# Patient Record
Sex: MALE | Race: WHITE | NOT HISPANIC OR LATINO | Employment: FULL TIME | URBAN - NONMETROPOLITAN AREA
[De-identification: names, ages, dates, MRNs, and addresses within clinical notes are randomized per-mention and may not be internally consistent; named-entity substitution may affect disease eponyms.]

---

## 2023-04-02 ENCOUNTER — HOSPITAL ENCOUNTER (EMERGENCY)
Facility: HOSPITAL | Age: 58
Discharge: HOME | End: 2023-04-02
Attending: EMERGENCY MEDICINE

## 2023-04-02 ENCOUNTER — APPOINTMENT (EMERGENCY)
Dept: NON INVASIVE DIAGNOSTICS | Facility: HOSPITAL | Age: 58
End: 2023-04-02

## 2023-04-02 ENCOUNTER — APPOINTMENT (EMERGENCY)
Dept: RADIOLOGY | Facility: HOSPITAL | Age: 58
End: 2023-04-02

## 2023-04-02 VITALS
SYSTOLIC BLOOD PRESSURE: 113 MMHG | BODY MASS INDEX: 23.1 KG/M2 | OXYGEN SATURATION: 94 % | WEIGHT: 180 LBS | HEIGHT: 74 IN | DIASTOLIC BLOOD PRESSURE: 76 MMHG | HEART RATE: 93 BPM | RESPIRATION RATE: 18 BRPM | TEMPERATURE: 99.2 F

## 2023-04-02 DIAGNOSIS — M25.461 KNEE EFFUSION, RIGHT: Primary | ICD-10-CM

## 2023-04-02 LAB
APPEARANCE FLD: ABNORMAL
COLOR FLD: ABNORMAL
CRYSTALS SNV QL MICRO: NORMAL
LYMPHOCYTES # SNV MANUAL: 18 %
NEUTROPHILS NFR SNV MANUAL: 82 %
RBC # SNV MANUAL: ABNORMAL /UL (ref 0–10)
SITE: ABNORMAL
TOTAL CELLS COUNTED SPEC: 100
WBC # FLD MANUAL: ABNORMAL /UL (ref 0–200)

## 2023-04-02 RX ORDER — PREDNISONE 10 MG/1
TABLET ORAL
Qty: 40 TABLET | Refills: 0 | Status: SHIPPED | OUTPATIENT
Start: 2023-04-02

## 2023-04-02 RX ORDER — KETOROLAC TROMETHAMINE 30 MG/ML
60 INJECTION, SOLUTION INTRAMUSCULAR; INTRAVENOUS ONCE
Status: DISCONTINUED | OUTPATIENT
Start: 2023-04-02 | End: 2023-04-02

## 2023-04-02 RX ORDER — DULOXETIN HYDROCHLORIDE 60 MG/1
60 CAPSULE, DELAYED RELEASE ORAL DAILY
COMMUNITY

## 2023-04-02 RX ORDER — KETOROLAC TROMETHAMINE 30 MG/ML
60 INJECTION, SOLUTION INTRAMUSCULAR; INTRAVENOUS ONCE
Status: COMPLETED | OUTPATIENT
Start: 2023-04-02 | End: 2023-04-02

## 2023-04-02 RX ORDER — IBUPROFEN 800 MG/1
800 TABLET ORAL EVERY 6 HOURS PRN
Qty: 30 TABLET | Refills: 0 | Status: SHIPPED | OUTPATIENT
Start: 2023-04-02

## 2023-04-02 RX ORDER — LEFLUNOMIDE 10 MG/1
10 TABLET ORAL 2 TIMES DAILY
COMMUNITY

## 2023-04-02 RX ORDER — PANTOPRAZOLE SODIUM 40 MG/1
40 TABLET, DELAYED RELEASE ORAL DAILY
COMMUNITY

## 2023-04-02 RX ORDER — LIDOCAINE HYDROCHLORIDE AND EPINEPHRINE 10; 10 MG/ML; UG/ML
20 INJECTION, SOLUTION INFILTRATION; PERINEURAL ONCE
Status: COMPLETED | OUTPATIENT
Start: 2023-04-02 | End: 2023-04-02

## 2023-04-02 RX ADMIN — LIDOCAINE HYDROCHLORIDE,EPINEPHRINE BITARTRATE 20 ML: 10; .01 INJECTION, SOLUTION INFILTRATION; PERINEURAL at 11:50

## 2023-04-02 RX ADMIN — KETOROLAC TROMETHAMINE 60 MG: 30 INJECTION, SOLUTION INTRAMUSCULAR; INTRAVENOUS at 11:50

## 2023-04-02 NOTE — ED PROVIDER NOTES
History  Chief Complaint   Patient presents with   • Knee Pain     Patient reports hx of RA  States right knee pain for the past month that radiates down leg and to buttocks  Taking prednisone without relief  Also reports stiffness in neck     Patient is a 45-year-old male  He has a history of rheumatoid arthritis  He he is on low-dose prednisone  He presents to the emergency room with a 1 month history of increased right knee swelling and pain  No trauma  No fever  Does feel like the lower leg below the knee is swollen  He is also starting to experience some neck and back pain  He reports some occasional tingling to right foot  Otherwise no motor or sensory complaints  Prior to Admission Medications   Prescriptions Last Dose Informant Patient Reported? Taking? Amylase-Lipase-Protease (PANCRELIPASE 27368 PO)   Yes Yes   Sig: Take by mouth   DULoxetine (CYMBALTA) 60 mg delayed release capsule   Yes Yes   Sig: Take 60 mg by mouth daily   leflunomide (ARAVA) 10 MG tablet   Yes Yes   Sig: Take 10 mg by mouth 2 (two) times a day   pantoprazole (PROTONIX) 40 mg tablet   Yes Yes   Sig: Take 40 mg by mouth daily      Facility-Administered Medications: None       Past Medical History:   Diagnosis Date   • RA (rheumatoid arthritis) ([facility] [address] )        Past Surgical History:   Procedure Laterality Date   • CHOLECYSTECTOMY     • FINGER SURGERY Right     fourth digit       History reviewed  No pertinent family history  I have reviewed and agree with the history as documented  E-Cigarette/Vaping     E-Cigarette/Vaping Substances     Social History     Tobacco Use   • Smoking status: Never   • Smokeless tobacco: Never   Substance Use Topics   • Alcohol use: Never   • Drug use: Never       Review of Systems   Constitutional: Negative for chills and fever  HENT: Negative for rhinorrhea and sore throat  Eyes: Negative for pain, redness and visual disturbance     Respiratory: Negative for cough and shortness of breath  Cardiovascular: Negative for chest pain and leg swelling  Gastrointestinal: Negative for abdominal pain, diarrhea and vomiting  Endocrine: Negative for polydipsia and polyuria  Genitourinary: Negative for dysuria, frequency and hematuria  Musculoskeletal: Positive for arthralgias, back pain, joint swelling and neck pain  Skin: Negative for rash and wound  Allergic/Immunologic: Negative for immunocompromised state  Neurological: Negative for weakness, numbness and headaches  Psychiatric/Behavioral: Negative for hallucinations and suicidal ideas  All other systems reviewed and are negative  Physical Exam  Physical Exam  Vitals reviewed  Constitutional:       General: He is not in acute distress  Appearance: He is not toxic-appearing  HENT:      Head: Normocephalic and atraumatic  Nose: Nose normal       Mouth/Throat:      Mouth: Mucous membranes are moist    Eyes:      General:         Right eye: No discharge  Left eye: No discharge  Conjunctiva/sclera: Conjunctivae normal    Cardiovascular:      Rate and Rhythm: Regular rhythm  Tachycardia present  Pulses: Normal pulses  Heart sounds: Normal heart sounds  No murmur heard  No friction rub  No gallop  Pulmonary:      Effort: Pulmonary effort is normal  No respiratory distress  Breath sounds: Normal breath sounds  No stridor  No wheezing, rhonchi or rales  Abdominal:      General: Bowel sounds are normal  There is no distension  Palpations: Abdomen is soft  Tenderness: There is no abdominal tenderness  There is no right CVA tenderness, left CVA tenderness, guarding or rebound  Musculoskeletal:         General: Swelling and tenderness present  No deformity or signs of injury  Cervical back: Normal range of motion and neck supple  No rigidity  Right lower leg: Edema present  Left lower leg: No edema  Comments: Patient does have a right knee effusion    No erythema or or warmth  There might be some slight right lower leg swelling also  Skin:     General: Skin is warm and dry  Coloration: Skin is not jaundiced or pale  Findings: No bruising, erythema or rash  Neurological:      General: No focal deficit present  Mental Status: He is alert and oriented to person, place, and time  Cranial Nerves: No facial asymmetry  Sensory: No sensory deficit  Motor: Motor function is intact  Psychiatric:         Mood and Affect: Mood normal          Behavior: Behavior normal          Vital Signs  ED Triage Vitals [04/02/23 1121]   Temperature Pulse Respirations Blood Pressure SpO2   99 2 °F (37 3 °C) 103 18 134/83 97 %      Temp Source Heart Rate Source Patient Position - Orthostatic VS BP Location FiO2 (%)   Temporal Monitor Sitting Left arm --      Pain Score       8           Vitals:    04/02/23 1121 04/02/23 1200 04/02/23 1230 04/02/23 1300   BP: 134/83 137/85 113/72 111/68   Pulse: 103 92 80 79   Patient Position - Orthostatic VS: Sitting Sitting Sitting Sitting         Visual Acuity      ED Medications  Medications   lidocaine-epinephrine (XYLOCAINE/EPINEPHRINE) 1 %-1:100,000 injection 20 mL (20 mL Infiltration Given 4/2/23 1150)   ketorolac (TORADOL) injection 60 mg (60 mg Intramuscular Given 4/2/23 1150)       Diagnostic Studies  Results Reviewed     Procedure Component Value Units Date/Time    Synovial fluid, Culture and Gram stain [[id number]] Collected: 04/02/23 1227    Lab Status: In process Specimen: Body Fluid from Joint, Right Knee Updated: 04/02/23 1254    Synovial fluid, white cell count w/ diff [[id number]] Collected: 04/02/23 1227    Lab Status: In process Specimen: Synovial Fluid from Joint, Right Knee Updated: 04/02/23 1254    Synovial fluid, RBC count [[id number]] Collected: 04/02/23 1227    Lab Status:  In process Specimen: Synovial Fluid from Joint, Right Knee Updated: 04/02/23 1254    Synovial fluid, crystal [[id number]] Collected: 04/02/23 1227    Lab Status: In process Specimen: Synovial Fluid from Joint, Right Knee Updated: 04/02/23 1254                 XR knee 4+ views Right injury   ED Interpretation by Edgar Silva MD (04/02 1306)   No fracture or dislocation      VAS lower limb venous duplex study, unilateral/limited    (Results Pending)              Procedures  Arthrocentesis    Date/Time: 4/2/2023 12:21 PM  Performed by: Edgar Silva MD  Authorized by: Edgar Silva MD     Indications:  Joint swelling, pain, diagnostic evaluation and therapeutic  Body area:  Knee  Joint:  Right knee  Local anesthesia used?: Yes    Local anesthetic:  Lidocaine 1% with epinephrine  Preparation: Patient was prepped and draped in usual sterile fashion    Needle size:  18 G  Approach:  Lateral  Aspirate amount (ml):  50  Aspirate:  Blood-tinged  Patient tolerance:  Patient tolerated the procedure well with no immediate complications             ED Course                               SBIRT 20yo+    Flowsheet Row Most Recent Value   SBIRT (23 yo +)    In order to provide better care to our patients, we are screening all of our patients for alcohol and drug use  Would it be okay to ask you these screening questions? Yes Filed at: 04/02/2023 1138   Initial Alcohol Screen: US AUDIT-C     1  How often do you have a drink containing alcohol? 0 Filed at: 04/02/2023 1138   2  How many drinks containing alcohol do you have on a typical day you are drinking? 0 Filed at: 04/02/2023 1138   3a  Male UNDER 65: How often do you have five or more drinks on one occasion? 0 Filed at: 04/02/2023 1138   Audit-C Score 0 Filed at: 04/02/2023 1138   GLADYS: How many times in the past year have you    Used an illegal drug or used a prescription medication for non-medical reasons? Never Filed at: 04/02/2023 1138                    Medical Decision Making  X-ray was negative for fracture or dislocation  Ultrasound was negative for DVT    Septic joint is unlikely  Patient is afebrile  The knee is not red or warm  Patient does have a fairly decent range of motion with the knee  This would be unlikely in a septic knee  The knee was tapped for for symptomatic relief  Studies were sent for cell counts, Gram stain, culture, and crystals  These will not be back for some time  Patient is appropriate for discharge and outpatient management  More than likely the knee swelling is secondary to his arthritis  Also the differential would be meniscus injury or other inflammatory causes of knee swelling  Amount and/or Complexity of Data Reviewed  Labs: ordered  Radiology: ordered and independent interpretation performed  Decision-making details documented in ED Course  Risk  Prescription drug management  Decision regarding hospitalization  Disposition  Final diagnoses:   Knee effusion, right     Time reflects when diagnosis was documented in both MDM as applicable and the Disposition within this note     Time User Action Codes Description Comment    4/2/2023  1:18 PM Andreea Bae Add [[id number] [id number]] Knee effusion, right       ED Disposition     ED Disposition   Discharge    Condition   Stable    Date/Time   Sun Apr 2, 2023  1:18 PM    Comment   Lv Portillo discharge to home/self care                 Follow-up Information     Follow up With Specialties Details Why Contact Info    Follow-up with your rheumatologist later this week              Patient's Medications   Discharge Prescriptions    IBUPROFEN (MOTRIN) 800 MG TABLET    Take 1 tablet (800 mg total) by mouth every 6 (six) hours as needed (pain)       Start Date: 4/2/2023  End Date: --       Order Dose: 800 mg       Quantity: 30 tablet    Refills: 0    PREDNISONE 10 MG TABLET    60mg po qd x 3 days, then 40mg po qd x 3 days, then 20mg po qd x 3 days, then 10mg po qd x 3 days, then stop       Start Date: 4/2/2023  End Date: --       Order Dose: --       Quantity: 40 tablet    Refills: 0       No discharge procedures on file      PDMP Review     None          ED Provider  Electronically Signed by           Baylee France MD  04/02/23 3865

## 2023-04-05 LAB
BACTERIA SPEC BFLD CULT: NO GROWTH
GRAM STN SPEC: NORMAL

## 2023-05-16 ENCOUNTER — APPOINTMENT (EMERGENCY)
Dept: CT IMAGING | Facility: HOSPITAL | Age: 58
End: 2023-05-16

## 2023-05-16 ENCOUNTER — APPOINTMENT (EMERGENCY)
Dept: RADIOLOGY | Facility: HOSPITAL | Age: 58
End: 2023-05-16

## 2023-05-16 ENCOUNTER — HOSPITAL ENCOUNTER (INPATIENT)
Facility: HOSPITAL | Age: 58
LOS: 2 days | Discharge: HOME | End: 2023-05-18
Attending: EMERGENCY MEDICINE | Admitting: INTERNAL MEDICINE

## 2023-05-16 DIAGNOSIS — R29.898 LEG WEAKNESS: Primary | ICD-10-CM

## 2023-05-16 DIAGNOSIS — M25.461 EFFUSION OF RIGHT KNEE: ICD-10-CM

## 2023-05-16 DIAGNOSIS — M05.79 RHEUMATOID ARTHRITIS INVOLVING MULTIPLE SITES WITH POSITIVE RHEUMATOID FACTOR (HCC): ICD-10-CM

## 2023-05-16 DIAGNOSIS — R50.9 FEVER, UNKNOWN ORIGIN: ICD-10-CM

## 2023-05-16 DIAGNOSIS — M48.061 LUMBAR STENOSIS: ICD-10-CM

## 2023-05-16 DIAGNOSIS — J02.9 SORE THROAT: ICD-10-CM

## 2023-05-16 DIAGNOSIS — R74.01 TRANSAMINITIS: ICD-10-CM

## 2023-05-16 DIAGNOSIS — M62.838 MUSCLE SPASMS OF NECK: ICD-10-CM

## 2023-05-16 PROBLEM — Q45.3 PANCREATIC ABNORMALITY: Status: ACTIVE | Noted: 2019-02-28

## 2023-05-16 PROBLEM — M79.10 MYALGIA: Status: ACTIVE | Noted: 2023-05-16

## 2023-05-16 LAB
ALBUMIN SERPL BCP-MCNC: 3.6 G/DL (ref 3.5–5)
ALP SERPL-CCNC: 72 U/L (ref 34–104)
ALT SERPL W P-5'-P-CCNC: 22 U/L (ref 7–52)
ANION GAP SERPL CALCULATED.3IONS-SCNC: 10 MMOL/L (ref 4–13)
AST SERPL W P-5'-P-CCNC: 19 U/L (ref 13–39)
BACTERIA UR QL AUTO: ABNORMAL /HPF
BASOPHILS # BLD AUTO: 0.03 THOUSANDS/ÂΜL (ref 0–0.1)
BASOPHILS NFR BLD AUTO: 1 % (ref 0–1)
BILIRUB SERPL-MCNC: 1.21 MG/DL (ref 0.2–1)
BILIRUB UR QL STRIP: NEGATIVE
BUN SERPL-MCNC: 15 MG/DL (ref 5–25)
CALCIUM SERPL-MCNC: 9.3 MG/DL (ref 8.4–10.2)
CHLORIDE SERPL-SCNC: 107 MMOL/L (ref 96–108)
CLARITY UR: CLEAR
CO2 SERPL-SCNC: 20 MMOL/L (ref 21–32)
COLOR UR: YELLOW
CREAT SERPL-MCNC: 1.03 MG/DL (ref 0.6–1.3)
CRP SERPL HS-MCNC: 7.47 MG/L
EOSINOPHIL # BLD AUTO: 0.11 THOUSAND/ÂΜL (ref 0–0.61)
EOSINOPHIL NFR BLD AUTO: 2 % (ref 0–6)
ERYTHROCYTE [DISTWIDTH] IN BLOOD BY AUTOMATED COUNT: 12.9 % (ref 11.6–15.1)
ERYTHROCYTE [SEDIMENTATION RATE] IN BLOOD: 35 MM/HOUR (ref 0–19)
FLUAV RNA RESP QL NAA+PROBE: NEGATIVE
FLUBV RNA RESP QL NAA+PROBE: NEGATIVE
GFR SERPL CREATININE-BSD FRML MDRD: 81 ML/MIN/1.73SQ M
GLUCOSE SERPL-MCNC: 128 MG/DL (ref 65–140)
GLUCOSE UR STRIP-MCNC: NEGATIVE MG/DL
HCT VFR BLD AUTO: 41.6 % (ref 36.5–49.3)
HGB BLD-MCNC: 14.4 G/DL (ref 12–17)
HGB UR QL STRIP.AUTO: NEGATIVE
IMM GRANULOCYTES # BLD AUTO: 0.02 THOUSAND/UL (ref 0–0.2)
IMM GRANULOCYTES NFR BLD AUTO: 0 % (ref 0–2)
KETONES UR STRIP-MCNC: NEGATIVE MG/DL
LACTATE SERPL-SCNC: 1.5 MMOL/L (ref 0.5–2)
LEUKOCYTE ESTERASE UR QL STRIP: NEGATIVE
LIPASE SERPL-CCNC: <6 U/L (ref 11–82)
LYMPHOCYTES # BLD AUTO: 0.98 THOUSANDS/ÂΜL (ref 0.6–4.47)
LYMPHOCYTES NFR BLD AUTO: 16 % (ref 14–44)
MCH RBC QN AUTO: 30.8 PG (ref 26.8–34.3)
MCHC RBC AUTO-ENTMCNC: 34.6 G/DL (ref 31.4–37.4)
MCV RBC AUTO: 89 FL (ref 82–98)
MONOCYTES # BLD AUTO: 0.45 THOUSAND/ÂΜL (ref 0.17–1.22)
MONOCYTES NFR BLD AUTO: 7 % (ref 4–12)
NEUTROPHILS # BLD AUTO: 4.58 THOUSANDS/ÂΜL (ref 1.85–7.62)
NEUTS SEG NFR BLD AUTO: 74 % (ref 43–75)
NITRITE UR QL STRIP: NEGATIVE
NON-SQ EPI CELLS URNS QL MICRO: ABNORMAL /HPF
NRBC BLD AUTO-RTO: 0 /100 WBCS
PH UR STRIP.AUTO: 5.5 [PH]
PLATELET # BLD AUTO: 251 THOUSANDS/UL (ref 149–390)
PMV BLD AUTO: 9 FL (ref 8.9–12.7)
POTASSIUM SERPL-SCNC: 3.8 MMOL/L (ref 3.5–5.3)
PROT SERPL-MCNC: 6.7 G/DL (ref 6.4–8.4)
PROT UR STRIP-MCNC: NEGATIVE MG/DL
RBC # BLD AUTO: 4.68 MILLION/UL (ref 3.88–5.62)
RBC #/AREA URNS AUTO: ABNORMAL /HPF
RSV RNA RESP QL NAA+PROBE: NEGATIVE
S PYO DNA THROAT QL NAA+PROBE: NOT DETECTED
SARS-COV-2 RNA RESP QL NAA+PROBE: NEGATIVE
SODIUM SERPL-SCNC: 137 MMOL/L (ref 135–147)
SP GR UR STRIP.AUTO: 1.01 (ref 1–1.03)
TSH SERPL DL<=0.05 MIU/L-ACNC: 1.74 UIU/ML (ref 0.45–4.5)
UROBILINOGEN UR QL STRIP.AUTO: 0.2 E.U./DL
WBC # BLD AUTO: 6.17 THOUSAND/UL (ref 4.31–10.16)
WBC #/AREA URNS AUTO: ABNORMAL /HPF

## 2023-05-16 RX ORDER — ACETAMINOPHEN 325 MG/1
650 TABLET ORAL EVERY 6 HOURS PRN
Status: DISCONTINUED | OUTPATIENT
Start: 2023-05-16 | End: 2023-05-18 | Stop reason: HOSPADM

## 2023-05-16 RX ORDER — METHOCARBAMOL 500 MG/1
500 TABLET, FILM COATED ORAL EVERY 6 HOURS PRN
Status: DISCONTINUED | OUTPATIENT
Start: 2023-05-16 | End: 2023-05-16

## 2023-05-16 RX ORDER — ENOXAPARIN SODIUM 100 MG/ML
40 INJECTION SUBCUTANEOUS EVERY 24 HOURS
Status: DISCONTINUED | OUTPATIENT
Start: 2023-05-16 | End: 2023-05-18 | Stop reason: HOSPADM

## 2023-05-16 RX ORDER — PANTOPRAZOLE SODIUM 40 MG/1
40 TABLET, DELAYED RELEASE ORAL
Status: DISCONTINUED | OUTPATIENT
Start: 2023-05-17 | End: 2023-05-18 | Stop reason: HOSPADM

## 2023-05-16 RX ORDER — LEFLUNOMIDE 20 MG/1
20 TABLET ORAL DAILY
Status: DISCONTINUED | OUTPATIENT
Start: 2023-05-17 | End: 2023-05-18 | Stop reason: HOSPADM

## 2023-05-16 RX ORDER — DULOXETIN HYDROCHLORIDE 30 MG/1
60 CAPSULE, DELAYED RELEASE ORAL DAILY
Status: DISCONTINUED | OUTPATIENT
Start: 2023-05-17 | End: 2023-05-18 | Stop reason: HOSPADM

## 2023-05-16 RX ORDER — LIDOCAINE 50 MG/G
2 PATCH TOPICAL ONCE
Status: COMPLETED | OUTPATIENT
Start: 2023-05-16 | End: 2023-05-17

## 2023-05-16 RX ORDER — IBUPROFEN 600 MG/1
600 TABLET ORAL EVERY 6 HOURS PRN
Status: DISCONTINUED | OUTPATIENT
Start: 2023-05-16 | End: 2023-05-18 | Stop reason: HOSPADM

## 2023-05-16 RX ORDER — OXYCODONE HYDROCHLORIDE 10 MG/1
10 TABLET ORAL EVERY 4 HOURS PRN
Status: DISCONTINUED | OUTPATIENT
Start: 2023-05-16 | End: 2023-05-18 | Stop reason: HOSPADM

## 2023-05-16 RX ORDER — OXYCODONE HYDROCHLORIDE 5 MG/1
5 TABLET ORAL ONCE
Status: COMPLETED | OUTPATIENT
Start: 2023-05-16 | End: 2023-05-16

## 2023-05-16 RX ORDER — METHOCARBAMOL 500 MG/1
500 TABLET, FILM COATED ORAL 4 TIMES DAILY
Status: DISCONTINUED | OUTPATIENT
Start: 2023-05-16 | End: 2023-05-18 | Stop reason: HOSPADM

## 2023-05-16 RX ORDER — HYDROMORPHONE HCL/PF 1 MG/ML
0.5 SYRINGE (ML) INJECTION EVERY 4 HOURS PRN
Status: DISCONTINUED | OUTPATIENT
Start: 2023-05-16 | End: 2023-05-18 | Stop reason: HOSPADM

## 2023-05-16 RX ADMIN — PANCRELIPASE 24000 UNITS: 24000; 76000; 120000 CAPSULE, DELAYED RELEASE PELLETS ORAL at 18:05

## 2023-05-16 RX ADMIN — OXYCODONE HYDROCHLORIDE 10 MG: 10 TABLET ORAL at 22:25

## 2023-05-16 RX ADMIN — LIDOCAINE 5% 2 PATCH: 700 PATCH TOPICAL at 17:33

## 2023-05-16 RX ADMIN — METHOCARBAMOL 500 MG: 500 TABLET ORAL at 22:23

## 2023-05-16 RX ADMIN — METHOCARBAMOL 500 MG: 500 TABLET ORAL at 17:30

## 2023-05-16 RX ADMIN — IOHEXOL 85 ML: 350 INJECTION, SOLUTION INTRAVENOUS at 11:10

## 2023-05-16 RX ADMIN — OXYCODONE HYDROCHLORIDE 5 MG: 5 TABLET ORAL at 17:30

## 2023-05-16 NOTE — ASSESSMENT & PLAN NOTE
-On cimzia outpatient, contributing to immunocompromised state which makes month of fevers outpatient more concerning   -Gets occasional prednisone tapers for uncontrolled RA flares, recently just about finished with one and thus no steroids needed at this time

## 2023-05-16 NOTE — ASSESSMENT & PLAN NOTE
-Chronic, in April 2023 had joint aspiration and synovial fluid studies that provided symptomatic relief and showed likely inflammatory synovial fluid   -Likely recurrence of chronic inflammation limiting movement and causing pain with active ROM knee  -Treating with general anti-inflammatory treatment including acetaminophen and ibuprofen at this time

## 2023-05-16 NOTE — QUICK NOTE
Kamini present at bedside, discussed the plan of care ruling out spinal cord compression with both partners who expressed understanding and agreed with the need for admission    Answered all their questions at this time   -Shannon Bowers, MD

## 2023-05-16 NOTE — ASSESSMENT & PLAN NOTE
-Present for 1 5 weeks, progressively worsening and concerning given backdrop of rheumatoid arthritis with roughly 1 month of fever/chills while being on immunosuppressant injectable  ESR 35 on admission, no WBC elevation  May be related to pain, right knee swelling discussed below or spinal cord compression, for which MRI L-spine will be obtained to rule out abscess, tumor, disc herniation or other compressive pathologies leading to leg weakness    -PT/OT consulted

## 2023-05-16 NOTE — ASSESSMENT & PLAN NOTE
-C-spine paraspinal tenderness and shoulder pain/tenderness on examination, RLE posterior thigh to posterior ankle pain/tenderness  Symptoms not reminiscent of prior sciatica of LLE but are accompanied with weakness   -Alongside MRI L-spine, will obtain MRI c-spine  -Treating with ibuprofen, acetaminophen, lidocaine patches and methocarbamol    -Also had painful swallowing x1 day PTA, CT neck soft tissue showed no abscess or acute pathology, pain likely correlated with ongoing myalgias in posterior neck  Possible tension etiology given noted poor sitting posture with head thrust forward

## 2023-05-16 NOTE — LETTER
[facility] [address]  [address] [address]  Dept: [phone]    May 18, 2023     Patient: Elmer Wilson   YOB: 1965   Date of Visit: 5/16/2023       To Whom it May Concern:    Elmer Wilson is under my professional care  He was seen in the hospital from 5/16/2023 to 05/18/23  Liver US, MRI, CT neck, CXR all completed along with blood work while he was hospitalized  He may return to work on 6/5/23 without limitations  If you have any questions or concerns, please don't hesitate to call           Sincerely,          Jay Jay Jackson PA-C

## 2023-05-16 NOTE — H&P
[address]  H&P  Name: Valerie Strong 56 y o  male I MRN: [id number]  Unit/Bed#: 495-87 I Date of Admission: 5/16/2023   Date of Service: 5/16/2023 I Hospital Day: 0      Assessment/Plan   * Right leg weakness  Assessment & Plan  -Present for 1 5 weeks, progressively worsening and concerning given backdrop of rheumatoid arthritis with roughly 1 month of fever/chills while being on immunosuppressant injectable  ESR 35 on admission, no WBC elevation  May be related to pain, right knee swelling discussed below or spinal cord compression, for which MRI L-spine will be obtained to rule out abscess, tumor, disc herniation or other compressive pathologies leading to leg weakness  -PT/OT consulted    Effusion of right knee  Assessment & Plan  -Chronic, in April 2023 had joint aspiration and synovial fluid studies that provided symptomatic relief and showed likely inflammatory synovial fluid   -Likely recurrence of chronic inflammation limiting movement and causing pain with active ROM knee  -Treating with general anti-inflammatory treatment including acetaminophen and ibuprofen at this time    Myalgia  Assessment & Plan  -C-spine paraspinal tenderness and shoulder pain/tenderness on examination, RLE posterior thigh to posterior ankle pain/tenderness  Symptoms not reminiscent of prior sciatica of LLE but are accompanied with weakness   -Alongside MRI L-spine, will obtain MRI c-spine  -Treating with ibuprofen, acetaminophen, lidocaine patches and methocarbamol    -Also had painful swallowing x1 day PTA, CT neck soft tissue showed no abscess or acute pathology, pain likely correlated with ongoing myalgias in posterior neck  Possible tension etiology given noted poor sitting posture with head thrust forward      Rheumatoid arthritis involving multiple sites with positive rheumatoid factor ([facility] [address] )  Assessment & Plan  -On cimzia outpatient, contributing to immunocompromised state which makes month of fevers "outpatient more concerning   -Gets occasional prednisone tapers for uncontrolled RA flares, recently just about finished with one and thus no steroids needed at this time  Pancreatic abnormality  Assessment & Plan  -Continue equivalent to home pancreatic enzyme supplementation         VTE Prophylaxis: none, VTE score = 1  Code Status: Level 1 - Full code  POLST: POLST form is not discussed and not completed at this time  Discussion with family: bravosuleiman present at bedside and aware of plan    Anticipated Length of Stay:  Patient will be admitted on an Inpatient basis with an anticipated length of stay of  < 2 midnights  Justification for Hospital Stay: Rule out spinal cord compression    Total Time for Visit, including Counseling / Coordination of Care: 60 minutes  Greater than 50% of this total time spent on direct patient counseling and coordination of care  Chief Complaint:   Right leg weakness    History of Present Illness:     Abilio Llanes is a 56 y o  male who presents with 1 5 weeks of neck pain radiating to the shoulders and right lower extremity pain and weakness over the same time frame  Moderate intensity  the symptoms have been going on since December shortly before he started taking cimzia for his poorly controlled rheumatoid arthritis for which he also occasionally receives prednisone tapers for flareups  Nevertheless he has been immunocompromised for months and for the past few weeks has been having intermittent fevers and chills at home with a Tmax yesterday of 100 4 F  Described neck pain as \"pulling\" and RLE posterior thigh->posterior ankle pain as \"shooting\" but not burning, electric/shocking or numbness/tingling, and states it does not feel like a past episode of sciatica he had years ago  Also notes right knee swelling which was aspirated in April 2023 and synovial fluid was analyzed (see A&P) and fluid has reaccumulated which is making it difficult for him to walk    It is not " painful  ED work-up included ESR, CBC CMP, lactate, Lyme, high sensitive CRP, mononucleosis screen, blood cultures, UA microscopy reflex to cultures, CT soft tissue to work-up a complaint of left neck pain with swallowing x1 day likely correlated with the myalgias he is experiencing on his posterior neck, portable chest x-ray, EKG, RSV/flu/COVID/strep testing  Review of Systems:    Review of Systems   Constitutional: Positive for chills and fever  HENT: Positive for trouble swallowing (pain swallowing starting yesterday)  Negative for congestion, drooling, ear pain, facial swelling, rhinorrhea, sinus pressure, sinus pain and sore throat  Eyes: Negative for pain and visual disturbance  Respiratory: Negative for cough, shortness of breath and wheezing  Cardiovascular: Negative for chest pain, palpitations and leg swelling  Gastrointestinal: Negative for abdominal pain, diarrhea, nausea and vomiting  Genitourinary: Negative for dysuria and hematuria  Musculoskeletal: Positive for gait problem (2/2 right knee swelling and leg pain), joint swelling (right knee) and myalgias (neck and right lower extremity)  Negative for arthralgias and back pain  Skin: Negative for color change and rash  Neurological: Positive for weakness (RLE)  Negative for seizures, syncope, speech difficulty, light-headedness and numbness  Psychiatric/Behavioral: Negative for behavioral problems and confusion  All other systems reviewed and are negative  Past Medical and Surgical History:     Past Medical History:   Diagnosis Date   • Pancreatitis    • RA (rheumatoid arthritis) ([facility] [address] )    • Rheumatoid arthritis        Past Surgical History:   Procedure Laterality Date   • CHOLECYSTECTOMY     • FINGER SURGERY Right     fourth digit       Meds/Allergies:    Prior to Admission medications    Medication Sig Start Date End Date Taking?  Authorizing Provider   Amylase-Lipase-Protease (PANCRELIPASE 24719 PO) Take by mouth "Yes Historical Provider, MD   Certolizumab Pegol (CIMZIA PREFILLED SC) Inject under the skin   Yes Historical Provider, MD   DULoxetine (CYMBALTA) 60 mg delayed release capsule Take 60 mg by mouth daily   Yes Historical Provider, MD   ibuprofen (MOTRIN) 800 mg tablet Take 1 tablet (800 mg total) by mouth every 6 (six) hours as needed (pain) 4/2/23  Yes Robert Song MD   leflunomide (ARAVA) 10 MG tablet Take 10 mg by mouth 2 (two) times a day   Yes Historical Provider, MD   pantoprazole (PROTONIX) 40 mg tablet Take 40 mg by mouth daily   Yes Historical Provider, MD   predniSONE 10 mg tablet 60mg po qd x 3 days, then 40mg po qd x 3 days, then 20mg po qd x 3 days, then 10mg po qd x 3 days, then stop 4/2/23  Yes Robert Song MD     I have reviewed home medications with patient personally  Duloxetine was not on the list of medications he showed me, but I do not want to potentially exclude a psychiatric medication with low risk of negative     Allergies: No Known Allergies    Social History:     Marital Status: Single   Occupation: , part desk-job part lifting  Patient Pre-hospital Living Situation: unknown  Patient Pre-hospital Level of Mobility: ambulatory  Patient Pre-hospital Diet Restrictions: unknown  Substance Use History:   Social History     Substance and Sexual Activity   Alcohol Use Never     Social History     Tobacco Use   Smoking Status Never   Smokeless Tobacco Never     Social History     Substance and Sexual Activity   Drug Use Never       Family History:    non-contributory    Physical Exam:     Vitals:   Blood Pressure: 112/71 (05/16/23 1601)  Pulse: 87 (05/16/23 1601)  Temperature: 98 5 °F (36 9 °C) (05/16/23 1601)  Temp Source: Tympanic (05/16/23 1601)  Respirations: 18 (05/16/23 1601)  Height: 6' 2\" (188 cm) (05/16/23 1601)  Weight - Scale: 81 5 kg (179 lb 10 8 oz) (05/16/23 1601)  SpO2: 98 % (05/16/23 1601)    Physical Exam  Vitals and nursing note reviewed     Constitutional: " General: He is not in acute distress  Appearance: Normal appearance  He is well-developed  He is not ill-appearing  HENT:      Head: Normocephalic and atraumatic  Nose: No rhinorrhea  Eyes:      General: No scleral icterus  Right eye: No discharge  Left eye: No discharge  Conjunctiva/sclera: Conjunctivae normal    Neck:      Comments: No thyromegaly  Cardiovascular:      Rate and Rhythm: Normal rate and regular rhythm  Pulses: Normal pulses  Heart sounds: Normal heart sounds  No murmur heard  No friction rub  No gallop  Pulmonary:      Effort: Pulmonary effort is normal  No respiratory distress  Breath sounds: Normal breath sounds  No stridor  No wheezing, rhonchi or rales  Abdominal:      General: Abdomen is flat  Bowel sounds are normal  There is no distension  Palpations: Abdomen is soft  There is no mass  Tenderness: There is no abdominal tenderness  There is no guarding or rebound  Musculoskeletal:         General: Swelling (right knee effusion compared to L knee  Nontender, no erythema, calor, wound or drainage) and tenderness (Posterior RLE tenderness from thigh to ankle, no anterior tenderness ) present  No signs of injury  Cervical back: Neck supple  Tenderness (mild left anterior neck tenderness  Posterior neck with c-spine paraspinal tenderness extending to the shoulders and upper dorsal arms) present  No rigidity  Lymphadenopathy:      Cervical: No cervical adenopathy  Skin:     General: Skin is warm and dry  Capillary Refill: Capillary refill takes less than 2 seconds  Coloration: Skin is not jaundiced or pale  Findings: No bruising, erythema, lesion or rash  Neurological:      Mental Status: He is alert  Sensory: No sensory deficit  Coordination: Coordination normal (Normal heel-to-shin)        Gait: Gait abnormal (Left leg normal steppage, right leg stiff due to knee swelling and swings out at an obtuse angle before coming to midline)  Comments: Cranial nerve XI test significantly painful but no weakness, upper extremities 5/5 bilaterally  LLE 5/5, RLE 4/5 limited secondary to pain and knee extension limited secondary to knee swelling  Psychiatric:         Mood and Affect: Mood normal          Behavior: Behavior normal              Additional Data:     Lab Results: I have personally reviewed pertinent reports  Results from last 7 days   Lab Units 05/16/23  1031   WBC Thousand/uL 6 17   HEMOGLOBIN g/dL 14 4   HEMATOCRIT % 41 6   PLATELETS Thousands/uL 251   NEUTROS PCT % 74   LYMPHS PCT % 16   MONOS PCT % 7   EOS PCT % 2     Results from last 7 days   Lab Units 05/16/23  1031   SODIUM mmol/L 137   POTASSIUM mmol/L 3 8   CHLORIDE mmol/L 107   CO2 mmol/L 20*   BUN mg/dL 15   CREATININE mg/dL 1 03   ANION GAP mmol/L 10   CALCIUM mg/dL 9 3   ALBUMIN g/dL 3 6   TOTAL BILIRUBIN mg/dL 1 21*   ALK PHOS U/L 72   ALT U/L 22   AST U/L 19   GLUCOSE RANDOM mg/dL 128                 Results from last 7 days   Lab Units 05/16/23  1031   LACTIC ACID mmol/L 1 5       Imaging: I have personally reviewed pertinent reports  CT soft tissue neck with contrast   Final Result by Leann Lye MD (05/16 1131)   1  There is no suspicious mucosal lesion or drainable fluid/abscess collection  2  Small left axillary and intraclavicular lymph nodes which are below size criteria for pathologic adenopathy  3  Mild tree-in-bud type changes within the medial left lung apex, likely infectious/inflammatory in etiology  p      Workstation performed: [id number]         XR chest 1 view portable   Final Result by Fede Barnes MD (05/16 1100)      No acute cardiopulmonary disease                 Workstation performed: [id number]         MRI inpatient order    (Results Pending)       EKG, Pathology, and Other Studies Reviewed on Admission:   · EKG: NSR    ** Please Note: This note has been constructed using a voice recognition system   **

## 2023-05-16 NOTE — ED PROVIDER NOTES
History  Chief Complaint   Patient presents with   • Neck Pain     Patient reports neck pain for greater than a week  States it radiates from neck to b/l shoulders as well as back of head  Taking ibuprofen without relief  This is a pleasant 61-year-old male who presents here with his wife via private vehicle  He is here for evaluation of neck pain back pain right leg pain, right knee pain  Patient states that his leg weakness has progressed and is worsening  The symptoms have been ongoing for quite some time however he has had novel left anterior neck pain and pain with swallowing over the last few days  I did take the time to review this patient's records, he has a history of rheumatoid arthritis     , He does take monthly injections since December for this  He had emergency department visit here last month had his right knee drained  Also had neck and back pain  Follow-up with primary care 1 week ago today they had recommended secondary to his symptoms that he obtain an MRI that has been ordered but he never received it  They recommended that he go to the ER  The primary care doctor is through [facility]  He went to [facility] emergency room, he was given droperidol had a work-up he was offered MRI  Patient states secondary to the side effects of the droperidol he felt antsy, he ended up signing out [address] and never had the MRI completed  He states he was told that it would take 24 hours of him waiting in the ER for this to happen therefore he left  He states he has been having ongoing fevers and chills over the last month  He has been self treating with ibuprofen 400 mg every 8 hours over the last few days  He states he is here because he needs to figure out what is wrong with his neck and back  The neck and back pain there is no history of trauma  It is essentially unchanged    The pain ranges from the base of the skull down the entire spine and down the right posterior leg  There is no incontinence of urine or stool there is no saddle anesthesia however he does admit that he does have some leg weakness  He states he is unable to work secondary to this condition  The fact that he is likely on an immunomodulator and has been having fevers with new anterior neck pain he will be worked up for infectious causes as well as have imaging of the neck  Patient is in agreement with this plan  He does not require any medication at this point in terms of pain medicine however it was offered  The study it was ordered by the other network was a noncontrast MRI of the lumbar spine  Prior to Admission Medications   Prescriptions Last Dose Informant Patient Reported? Taking? Amylase-Lipase-Protease (PANCRELIPASE 47514 PO)   Yes No   Sig: Take by mouth   DULoxetine (CYMBALTA) 60 mg delayed release capsule   Yes No   Sig: Take 60 mg by mouth daily   ibuprofen (MOTRIN) 800 mg tablet   No No   Sig: Take 1 tablet (800 mg total) by mouth every 6 (six) hours as needed (pain)   leflunomide (ARAVA) 10 MG tablet   Yes No   Sig: Take 10 mg by mouth 2 (two) times a day   pantoprazole (PROTONIX) 40 mg tablet   Yes No   Sig: Take 40 mg by mouth daily   predniSONE 10 mg tablet   No No   Simg po qd x 3 days, then 40mg po qd x 3 days, then 20mg po qd x 3 days, then 10mg po qd x 3 days, then stop      Facility-Administered Medications: None       Past Medical History:   Diagnosis Date   • RA (rheumatoid arthritis) (HCC)        Past Surgical History:   Procedure Laterality Date   • CHOLECYSTECTOMY     • FINGER SURGERY Right     fourth digit       History reviewed  No pertinent family history  I have reviewed and agree with the history as documented      E-Cigarette/Vaping     E-Cigarette/Vaping Substances     Social History     Tobacco Use   • Smoking status: Never   • Smokeless tobacco: Never   Substance Use Topics   • Alcohol use: Never   • Drug use: Never       Review of Systems Constitutional: Positive for chills and fever  Negative for activity change, appetite change, diaphoresis, fatigue and unexpected weight change  HENT: Positive for sore throat  Negative for trouble swallowing and voice change  Eyes: Negative  Respiratory: Negative  Negative for cough, chest tightness, shortness of breath and wheezing  Cardiovascular: Negative  Negative for chest pain, palpitations and leg swelling  Gastrointestinal: Negative  Negative for abdominal pain, blood in stool, nausea and vomiting  Endocrine: Negative  Genitourinary: Negative  Negative for flank pain and hematuria  Musculoskeletal: Positive for back pain and neck pain  Negative for arthralgias, gait problem, joint swelling, myalgias and neck stiffness  Skin: Negative  Negative for rash and wound  Allergic/Immunologic: Negative  Neurological: Negative  Negative for dizziness, seizures, syncope, weakness, light-headedness and headaches  Hematological: Negative  Psychiatric/Behavioral: Negative  All other systems reviewed and are negative  Physical Exam  Physical Exam  Vitals reviewed  Constitutional:       General: He is not in acute distress  Appearance: He is well-developed  He is not ill-appearing or diaphoretic  HENT:      Right Ear: External ear normal  No swelling  Tympanic membrane is not bulging  Left Ear: External ear normal  No swelling  Tympanic membrane is not bulging  Nose: Nose normal       Mouth/Throat:      Pharynx: Posterior oropharyngeal erythema present  No oropharyngeal exudate  Comments: Patient with posterior pharyngeal erythema however no edema or exudate  Eyes:      General: Lids are normal       Conjunctiva/sclera: Conjunctivae normal       Pupils: Pupils are equal, round, and reactive to light  Neck:      Thyroid: No thyromegaly  Vascular: No JVD  Trachea: No tracheal deviation     Cardiovascular:      Rate and Rhythm: Normal rate and regular rhythm  Pulses: Normal pulses  Heart sounds: Normal heart sounds  No murmur heard  No friction rub  No gallop  Pulmonary:      Effort: Pulmonary effort is normal  No respiratory distress  Breath sounds: Normal breath sounds  No stridor  No wheezing or rales  Chest:      Chest wall: No tenderness  Abdominal:      General: Bowel sounds are normal  There is no distension  Palpations: Abdomen is soft  There is no mass  Tenderness: There is no abdominal tenderness  There is no guarding or rebound  Negative signs include Grant's sign  Hernia: No hernia is present  Musculoskeletal:         General: No tenderness  Normal range of motion  Cervical back: Normal range of motion and neck supple  No edema  Normal range of motion  Comments: Tenderness essentially from the base of the skull midline posteriorly down the spine  No crepitus step-offs or deformity  Lymphadenopathy:      Cervical: No cervical adenopathy  Skin:     General: Skin is warm and dry  Capillary Refill: Capillary refill takes less than 2 seconds  Coloration: Skin is not pale  Findings: No erythema or rash  Neurological:      Mental Status: He is alert and oriented to person, place, and time  GCS: GCS eye subscore is 4  GCS verbal subscore is 5  GCS motor subscore is 6  Cranial Nerves: No cranial nerve deficit  Sensory: No sensory deficit  Deep Tendon Reflexes: Reflexes are normal and symmetric     Psychiatric:         Speech: Speech normal          Behavior: Behavior normal          Vital Signs  ED Triage Vitals [05/16/23 0946]   Temperature Pulse Respirations Blood Pressure SpO2   97 7 °F (36 5 °C) 72 18 143/72 98 %      Temp Source Heart Rate Source Patient Position - Orthostatic VS BP Location FiO2 (%)   Temporal Monitor Sitting Left arm --      Pain Score       8           Vitals:    05/16/23 0946   BP: 143/72   Pulse: 72   Patient Position - Orthostatic VS: Sitting         Visual Acuity      ED Medications  Medications   iohexol (OMNIPAQUE) 350 MG/ML injection (SINGLE-DOSE) 85 mL (85 mL Intravenous Given 5/16/23 1110)       Diagnostic Studies  Results Reviewed     Procedure Component Value Units Date/Time    Urinalysis with microscopic [[id number]]     Lab Status: No result Specimen: Urine     Urine culture [[id number]]     Lab Status: No result Specimen: Urine, Clean Catch     TSH [[id number]]  (Normal) Collected: 05/16/23 1031    Lab Status: Final result Specimen: Blood from Arm, Right Updated: 05/16/23 1332     TSH 3RD GENERATON 1 739 uIU/mL     FLU/RSV/COVID - if FLU/RSV clinically relevant [[id number]]  (Normal) Collected: 05/16/23 1031    Lab Status: Final result Specimen: Nares from Nose Updated: 05/16/23 1123     SARS-CoV-2 Negative     INFLUENZA A PCR Negative     INFLUENZA B PCR Negative     RSV PCR Negative    Narrative:      FOR PEDIATRIC PATIENTS - copy/paste COVID Guidelines URL to browser: [email or web address]  Team [employer]    SARS-CoV-2 assay is a Nucleic Acid Amplification assay intended for the  qualitative detection of nucleic acid from SARS-CoV-2 in nasopharyngeal  swabs  Results are for the presumptive identification of SARS-CoV-2 RNA  Positive results are indicative of infection with SARS-CoV-2, the virus  causing COVID-19, but do not rule out bacterial infection or co-infection  with other viruses  Laboratories within the [place] and its  territories are required to report all positive results to the appropriate  public health authorities  Negative results do not preclude SARS-CoV-2  infection and should not be used as the sole basis for treatment or other  patient management decisions  Negative results must be combined with  clinical observations, patient history, and epidemiological information  This test has not been FDA cleared or approved      This test has been authorized by FDA under an Emergency Use Authorization  (EUA)  This test is only authorized for the duration of time the  declaration that circumstances exist justifying the authorization of the  emergency use of an in vitro diagnostic tests for detection of SARS-CoV-2  virus and/or diagnosis of COVID-19 infection under section 564(b)(1) of  the Act, 21 U  S C  743HYF-8(K)(9), unless the authorization is terminated  or revoked sooner  The test has been validated but independent review by FDA  and CLIA is pending  Test performed using [employer] GeneXpert: This RT-PCR assay targets N2,  a region unique to SARS-CoV-2  A conserved region in the E-gene was chosen  for pan-Sarbecovirus detection which includes SARS-CoV-2  According to CMS-2020-01-R, this platform meets the definition of high-throughput technology      Sedimentation rate, automated [[id number]]  (Abnormal) Collected: 05/16/23 1031    Lab Status: Final result Specimen: Blood from Arm, Right Updated: 05/16/23 1114     Sed Rate 35 mm/hour     Strep A PCR [[id number]]  (Normal) Collected: 05/16/23 1031    Lab Status: Final result Specimen: Throat Updated: 05/16/23 1112     STREP A PCR Not Detected    Lipase [[id number]]  (Abnormal) Collected: 05/16/23 1031    Lab Status: Final result Specimen: Blood from Arm, Right Updated: 05/16/23 1104     Lipase <6 u/L     Comprehensive metabolic panel [[id number]]  (Abnormal) Collected: 05/16/23 1031    Lab Status: Final result Specimen: Blood from Arm, Right Updated: 05/16/23 1104     Sodium 137 mmol/L      Potassium 3 8 mmol/L      Chloride 107 mmol/L      CO2 20 mmol/L      ANION GAP 10 mmol/L      BUN 15 mg/dL      Creatinine 1 03 mg/dL      Glucose 128 mg/dL      Calcium 9 3 mg/dL      AST 19 U/L      ALT 22 U/L      Alkaline Phosphatase 72 U/L      Total Protein 6 7 g/dL      Albumin 3 6 g/dL      Total Bilirubin 1 21 mg/dL      eGFR 81 ml/min/1 73sq m     Narrative:      [place] guidelines for Chronic Kidney Disease (CKD): •  Stage 1 with normal or high GFR (GFR > 90 mL/min/1 73 square meters)  •  Stage 2 Mild CKD (GFR = 60-89 mL/min/1 73 square meters)  •  Stage 3A Moderate CKD (GFR = 45-59 mL/min/1 73 square meters)  •  Stage 3B Moderate CKD (GFR = 30-44 mL/min/1 73 square meters)  •  Stage 4 Severe CKD (GFR = 15-29 mL/min/1 73 square meters)  •  Stage 5 End Stage CKD (GFR <15 mL/min/1 73 square meters)  Note: GFR calculation is accurate only with a steady state creatinine    Lactic acid, plasma (w/reflex if result > 2 0) [[id number]]  (Normal) Collected: 05/16/23 1031    Lab Status: Final result Specimen: Blood from Arm, Right Updated: 05/16/23 1059     LACTIC ACID 1 5 mmol/L     Narrative:      Result may be elevated if tourniquet was used during collection  CBC and differential [[id number]] Collected: 05/16/23 1031    Lab Status: Final result Specimen: Blood from Arm, Right Updated: 05/16/23 1041     WBC 6 17 Thousand/uL      RBC 4 68 Million/uL      Hemoglobin 14 4 g/dL      Hematocrit 41 6 %      MCV 89 fL      MCH 30 8 pg      MCHC 34 6 g/dL      RDW 12 9 %      MPV 9 0 fL      Platelets 060 Thousands/uL      nRBC 0 /100 WBCs      Neutrophils Relative 74 %      Immat GRANS % 0 %      Lymphocytes Relative 16 %      Monocytes Relative 7 %      Eosinophils Relative 2 %      Basophils Relative 1 %      Neutrophils Absolute 4 58 Thousands/µL      Immature Grans Absolute 0 02 Thousand/uL      Lymphocytes Absolute 0 98 Thousands/µL      Monocytes Absolute 0 45 Thousand/µL      Eosinophils Absolute 0 11 Thousand/µL      Basophils Absolute 0 03 Thousands/µL     Lyme Antibody Profile with reflex to [facility] [[id number]] Collected: 05/16/23 1031    Lab Status: In process Specimen: Blood from Arm, Right Updated: 05/16/23 1039    High sensitivity CRP [[id number]] Collected: 05/16/23 1031    Lab Status:  In process Specimen: Blood from Arm, Right Updated: 05/16/23 1038    Mononucleosis screen [[id number]] Collected: 05/16/23 1031    Lab Status: In process Specimen: Blood from Arm, Right Updated: 05/16/23 1038    Blood culture #2 [[id number]] Collected: 05/16/23 1032    Lab Status: In process Specimen: Blood from Arm, Left Updated: 05/16/23 1038    Blood culture #1 [[id number]] Collected: 05/16/23 1031    Lab Status: In process Specimen: Blood from Arm, Right Updated: 05/16/23 1038    UA w Reflex to Microscopic w Reflex to Culture [[id number]]     Lab Status: No result Specimen: Urine                  CT soft tissue neck with contrast   Final Result by Mee Gonzalez MD (05/16 1131)   1  There is no suspicious mucosal lesion or drainable fluid/abscess collection  2  Small left axillary and intraclavicular lymph nodes which are below size criteria for pathologic adenopathy  3  Mild tree-in-bud type changes within the medial left lung apex, likely infectious/inflammatory in etiology  p      Workstation performed: [id number]         XR chest 1 view portable   Final Result by Jennifer Clark MD (05/16 1100)      No acute cardiopulmonary disease                 Workstation performed: [id number]                    Procedures  ECG 12 Lead Documentation Only    Date/Time: 5/16/2023 11:03 AM  Performed by: Santhosh Beck PA-C  Authorized by: Santhosh Beck PA-C     Indications / Diagnosis:  Neck back pain  ECG reviewed by me, the ED Provider: yes    Patient location:  ED  Previous ECG:     Comparison to cardiac monitor: Yes    Interpretation:     Interpretation: normal    Rate:     ECG rate:  77    ECG rate assessment: normal    Rhythm:     Rhythm: sinus rhythm    Ectopy:     Ectopy: none    QRS:     QRS axis:  Normal  Conduction:     Conduction: abnormal      Abnormal conduction: incomplete RBBB    ST segments:     ST segments:  Normal  T waves:     T waves: normal               ED Course  ED Course as of 05/16/23 1430   Tue May 16, 2023   1104 XR chest 1 view portable   1104 FINDINGS:     Cardiomediastinal silhouette normal      Lungs clear  No effusion or pneumothorax  Nodule projecting over the left base is a nipple  Upper abdomen normal  Bones normal for age      IMPRESSION:     No acute cardiopulmonary disease  1104 CBC reviewed no leukocytosis no anemia normal platelets no evidence of lactic acid acidosis  Lipase normal    1106 CMP reviewed, stable from prior, last total bilirubin was 1 3 on the ninth of this month  [place] flu RSV as well as strep is negative   1200 IMPRESSION:  1  There is no suspicious mucosal lesion or drainable fluid/abscess collection      2  Small left axillary and intraclavicular lymph nodes which are below size criteria for pathologic adenopathy      3  Mild tree-in-bud type changes within the medial left lung apex, likely infectious/inflammatory in etiology  p      1200 Awaiting CRP, urine, TSH then will make decision on disposition   1316 Labs still in process   1427 Tiger text sent to on call              HEART Risk Score    Flowsheet Row Most Recent Value   Heart Score Risk Calculator    History 0 Filed at: 05/16/2023 1103   ECG 0 Filed at: 05/16/2023 1103   Age 1 Filed at: 05/16/2023 1103   Risk Factors 1 Filed at: 05/16/2023 1103   Troponin 0 Filed at: 05/16/2023 1103   HEART Score 2 Filed at: 05/16/2023 1103                        SBIRT 20yo+    Flowsheet Row Most Recent Value   Initial Alcohol Screen: US AUDIT-C     1  How often do you have a drink containing alcohol? 0 Filed at: 05/16/2023 0947   2  How many drinks containing alcohol do you have on a typical day you are drinking? 0 Filed at: 05/16/2023 0947   3a  Male UNDER 65: How often do you have five or more drinks on one occasion? 0 Filed at: 05/16/2023 0947   3b  FEMALE Any Age, or MALE 65+: How often do you have 4 or more drinks on one occassion? 0 Filed at: 05/16/2023 0947   Audit-C Score 0 Filed at: 05/16/2023 6063   GLADYS: How many times in the past year have you        Used an illegal drug or used a prescription medication for non-medical reasons? Never Filed at: 05/16/2023 9231                    Medical Decision Making  65-year-old male here for evaluation of ongoing neck and back pain however has had novel chills and left neck pain and pain with swallowing  I did review his emergency department note here from April 2023, his primary care note from the ninth of this month as well as [facility] emergency department note from the same day  He will be worked up for infectious etiology including CBC blood cultures urine cultures COVID flu RSV strep as well as mononucleosis and Lyme titers  He will have imaging of the soft tissue of the neck with novel symptoms of left anterior neck pain and pain with swallowing  CT scan did not reveal any specific cause for infectious etiology there is some tree-in-bud appearance noted of the lungs that was caught in the apices on the CT scan of the neck  I discussed case with internal medicine I recommend  admission to obtain an MRI of the lumbar spine secondary to the worsening leg weakness in the setting of ongoing fevers  Amount and/or Complexity of Data Reviewed  Labs: ordered  Radiology: ordered  Decision-making details documented in ED Course  Risk  Prescription drug management  Decision regarding hospitalization            Disposition  Final diagnoses:   Leg weakness   Sore throat   Fever, unknown origin     Time reflects when diagnosis was documented in both MDM as applicable and the Disposition within this note     Time User Action Codes Description Comment    5/16/2023  2:29 PM Delories Clear Add [R29 898] Leg weakness     5/16/2023  2:29 PM Delories Clear Add [J02 9] Sore throat     5/16/2023  2:29 PM Sachi ALFORD Add [R50 9] Fever, unknown origin       ED Disposition     ED Disposition   Admit    Condition   Stable    Date/Time   Tue May 16, 2023  2:29 PM    Comment   Case was discussed with Dr Asaf Perry and the patient's admission status was agreed to be Admission Status: inpatient status to the service of Dr Violetta Souza   Follow-up Information    None         Patient's Medications   Discharge Prescriptions    No medications on file       No discharge procedures on file      PDMP Review     None          ED Provider  Electronically Signed by           Benny Liang PA-C  05/16/23 0245

## 2023-05-17 ENCOUNTER — APPOINTMENT (INPATIENT)
Dept: ULTRASOUND IMAGING | Facility: HOSPITAL | Age: 58
End: 2023-05-17

## 2023-05-17 ENCOUNTER — APPOINTMENT (INPATIENT)
Dept: MRI IMAGING | Facility: HOSPITAL | Age: 58
End: 2023-05-17

## 2023-05-17 PROBLEM — R74.01 TRANSAMINITIS: Status: ACTIVE | Noted: 2023-05-17

## 2023-05-17 LAB
ALBUMIN SERPL BCP-MCNC: 3.3 G/DL (ref 3.5–5)
ALP SERPL-CCNC: 152 U/L (ref 34–104)
ALT SERPL W P-5'-P-CCNC: 42 U/L (ref 7–52)
ANA SER QL IA: NEGATIVE
ANION GAP SERPL CALCULATED.3IONS-SCNC: 7 MMOL/L (ref 4–13)
AST SERPL W P-5'-P-CCNC: 85 U/L (ref 13–39)
ATRIAL RATE: 104 BPM
ATRIAL RATE: 77 BPM
B BURGDOR IGG+IGM SER-ACNC: <0.2 AI
B PARAP IS1001 DNA NPH QL NAA+NON-PROBE: NOT DETECTED
B PERT.PT PRMT NPH QL NAA+NON-PROBE: NOT DETECTED
BACTERIA UR CULT: NORMAL
BASOPHILS # BLD AUTO: 0.02 THOUSANDS/ÂΜL (ref 0–0.1)
BASOPHILS NFR BLD AUTO: 0 % (ref 0–1)
BILIRUB SERPL-MCNC: 1.6 MG/DL (ref 0.2–1)
BUN SERPL-MCNC: 19 MG/DL (ref 5–25)
C PNEUM DNA NPH QL NAA+NON-PROBE: NOT DETECTED
CALCIUM ALBUM COR SERPL-MCNC: 9.6 MG/DL (ref 8.3–10.1)
CALCIUM SERPL-MCNC: 9 MG/DL (ref 8.4–10.2)
CHLORIDE SERPL-SCNC: 106 MMOL/L (ref 96–108)
CK SERPL-CCNC: 52 U/L (ref 39–308)
CO2 SERPL-SCNC: 23 MMOL/L (ref 21–32)
CREAT SERPL-MCNC: 1.08 MG/DL (ref 0.6–1.3)
CRP SERPL QL: 8.7 MG/L
EOSINOPHIL # BLD AUTO: 0.19 THOUSAND/ÂΜL (ref 0–0.61)
EOSINOPHIL NFR BLD AUTO: 3 % (ref 0–6)
ERYTHROCYTE [DISTWIDTH] IN BLOOD BY AUTOMATED COUNT: 13 % (ref 11.6–15.1)
FLUAV RNA NPH QL NAA+NON-PROBE: NOT DETECTED
FLUBV RNA NPH QL NAA+NON-PROBE: NOT DETECTED
GFR SERPL CREATININE-BSD FRML MDRD: 76 ML/MIN/1.73SQ M
GLUCOSE SERPL-MCNC: 104 MG/DL (ref 65–140)
HADV DNA NPH QL NAA+NON-PROBE: NOT DETECTED
HCOV 229E RNA NPH QL NAA+NON-PROBE: NOT DETECTED
HCOV HKU1 RNA NPH QL NAA+NON-PROBE: NOT DETECTED
HCOV NL63 RNA NPH QL NAA+NON-PROBE: NOT DETECTED
HCOV OC43 RNA NPH QL NAA+NON-PROBE: NOT DETECTED
HCT VFR BLD AUTO: 39.1 % (ref 36.5–49.3)
HETEROPH AB SER QL: NEGATIVE
HGB BLD-MCNC: 13.5 G/DL (ref 12–17)
HMPV RNA NPH QL NAA+NON-PROBE: NOT DETECTED
HPIV1 RNA NPH QL NAA+NON-PROBE: NOT DETECTED
HPIV2 RNA NPH QL NAA+NON-PROBE: NOT DETECTED
HPIV3 RNA NPH QL NAA+NON-PROBE: NOT DETECTED
HPIV4 RNA NPH QL NAA+NON-PROBE: NOT DETECTED
IMM GRANULOCYTES # BLD AUTO: 0.02 THOUSAND/UL (ref 0–0.2)
IMM GRANULOCYTES NFR BLD AUTO: 0 % (ref 0–2)
LYMPHOCYTES # BLD AUTO: 1.18 THOUSANDS/ÂΜL (ref 0.6–4.47)
LYMPHOCYTES NFR BLD AUTO: 19 % (ref 14–44)
M PNEUMO DNA NPH QL NAA+NON-PROBE: NOT DETECTED
MAGNESIUM SERPL-MCNC: 2.2 MG/DL (ref 1.9–2.7)
MCH RBC QN AUTO: 31 PG (ref 26.8–34.3)
MCHC RBC AUTO-ENTMCNC: 34.5 G/DL (ref 31.4–37.4)
MCV RBC AUTO: 90 FL (ref 82–98)
MONOCYTES # BLD AUTO: 0.63 THOUSAND/ÂΜL (ref 0.17–1.22)
MONOCYTES NFR BLD AUTO: 10 % (ref 4–12)
NEUTROPHILS # BLD AUTO: 4.25 THOUSANDS/ÂΜL (ref 1.85–7.62)
NEUTS SEG NFR BLD AUTO: 68 % (ref 43–75)
NRBC BLD AUTO-RTO: 0 /100 WBCS
P AXIS: 74 DEGREES
P AXIS: 86 DEGREES
PHOSPHATE SERPL-MCNC: 3.4 MG/DL (ref 2.7–4.5)
PLATELET # BLD AUTO: 245 THOUSANDS/UL (ref 149–390)
PMV BLD AUTO: 9.1 FL (ref 8.9–12.7)
POTASSIUM SERPL-SCNC: 4.3 MMOL/L (ref 3.5–5.3)
PR INTERVAL: 158 MS
PR INTERVAL: 162 MS
PROCALCITONIN SERPL-MCNC: 0.1 NG/ML
PROT SERPL-MCNC: 6.1 G/DL (ref 6.4–8.4)
QRS AXIS: 46 DEGREES
QRS AXIS: 72 DEGREES
QRSD INTERVAL: 72 MS
QRSD INTERVAL: 92 MS
QT INTERVAL: 316 MS
QT INTERVAL: 378 MS
QTC INTERVAL: 415 MS
QTC INTERVAL: 427 MS
RBC # BLD AUTO: 4.36 MILLION/UL (ref 3.88–5.62)
RSV RNA NPH QL NAA+NON-PROBE: NOT DETECTED
RV+EV RNA NPH QL NAA+NON-PROBE: NOT DETECTED
SARS-COV-2 RNA NPH QL NAA+NON-PROBE: NOT DETECTED
SODIUM SERPL-SCNC: 136 MMOL/L (ref 135–147)
T WAVE AXIS: 56 DEGREES
T WAVE AXIS: 71 DEGREES
VENTRICULAR RATE: 104 BPM
VENTRICULAR RATE: 77 BPM
WBC # BLD AUTO: 6.29 THOUSAND/UL (ref 4.31–10.16)

## 2023-05-17 RX ADMIN — OXYCODONE HYDROCHLORIDE 10 MG: 10 TABLET ORAL at 05:32

## 2023-05-17 RX ADMIN — PANCRELIPASE 24000 UNITS: 24000; 76000; 120000 CAPSULE, DELAYED RELEASE PELLETS ORAL at 08:56

## 2023-05-17 RX ADMIN — DULOXETINE HYDROCHLORIDE 60 MG: 30 CAPSULE, DELAYED RELEASE ORAL at 08:56

## 2023-05-17 RX ADMIN — METHOCARBAMOL 500 MG: 500 TABLET ORAL at 17:03

## 2023-05-17 RX ADMIN — METHOCARBAMOL 500 MG: 500 TABLET ORAL at 08:56

## 2023-05-17 RX ADMIN — LEFLUNOMIDE 20 MG: 20 TABLET, FILM COATED ORAL at 08:56

## 2023-05-17 RX ADMIN — GADOBUTROL 8 ML: 604.72 INJECTION INTRAVENOUS at 14:48

## 2023-05-17 RX ADMIN — PANCRELIPASE 24000 UNITS: 24000; 76000; 120000 CAPSULE, DELAYED RELEASE PELLETS ORAL at 17:03

## 2023-05-17 RX ADMIN — PANTOPRAZOLE SODIUM 40 MG: 40 TABLET, DELAYED RELEASE ORAL at 05:32

## 2023-05-17 RX ADMIN — METHOCARBAMOL 500 MG: 500 TABLET ORAL at 22:06

## 2023-05-17 RX ADMIN — PANCRELIPASE 24000 UNITS: 24000; 76000; 120000 CAPSULE, DELAYED RELEASE PELLETS ORAL at 12:51

## 2023-05-17 RX ADMIN — METHOCARBAMOL 500 MG: 500 TABLET ORAL at 12:51

## 2023-05-17 NOTE — ASSESSMENT & PLAN NOTE
· Chronic, in April 2023 had joint aspiration and synovial fluid studies that provided symptomatic relief and showed likely inflammatory synovial fluid    · Likely recurrence of chronic inflammation limiting movement and causing pain with active ROM knee  · Treating with general anti-inflammatory treatment including acetaminophen and ibuprofen at this time  · Orthopedics input appreciated, no intervention intended at this time

## 2023-05-17 NOTE — PROGRESS NOTES
[facility] [address]  Progress Note  Name: Domitila Reeves  MRN: [id number]  Unit/Bed#: 263-04 I Date of Admission: 5/16/2023   Date of Service: 5/17/2023 I Hospital Day: 1    Assessment/Plan   Transaminitis  Assessment & Plan  · Bilirubin on admission was 1 2, kellie to now 1 6 along with now abdnormal alk phos 152 and AST 85  · ALT still wnl  · Will continue to trend  · Will likely pursue hepatitis panel and RUQ US    Effusion of right knee  Assessment & Plan  · Chronic, in April 2023 had joint aspiration and synovial fluid studies that provided symptomatic relief and showed likely inflammatory synovial fluid  · Likely recurrence of chronic inflammation limiting movement and causing pain with active ROM knee  · Treating with general anti-inflammatory treatment including acetaminophen and ibuprofen at this time  · Orthopedics input appreciated, no intervention intended at this time    Myalgia  Assessment & Plan  · C-spine paraspinal tenderness and shoulder pain/tenderness on examination, RLE posterior thigh to posterior ankle pain/tenderness  Symptoms not reminiscent of prior sciatica of LLE but are accompanied with weakness  · Alongside MRI L-spine, will obtain MRI c-spine  · treating with ibuprofen, acetaminophen, lidocaine patches and methocarbamol  · Also had painful swallowing x1 day PTA, CT neck soft tissue showed no abscess or acute pathology, pain likely correlated with ongoing myalgias in posterior neck  · Possible tension etiology given noted poor sitting posture with head thrust forward  Rheumatoid arthritis involving multiple sites with positive rheumatoid factor ([facility] [address] )  Assessment & Plan  · On cimzia outpatient, contributing to immunocompromised state which makes month of fevers outpatient more concerning  · Gets occasional prednisone tapers for uncontrolled RA flares, recently just about finished with one and thus no steroids needed at this time      Pancreatic abnormality  Assessment & Plan  · Continue equivalent to home pancreatic enzyme supplementation    * Right leg weakness  Assessment & Plan  · Present for 1 5 weeks, progressively worsening and concerning given backdrop of rheumatoid arthritis with roughly 1 month of fever/chills while being on immunosuppressant injectable  · ESR 35 on admission, no WBC elevation  May be related to pain, right knee swelling discussed below or spinal cord compression, for which MRI L-spine will be obtained to rule out abscess, tumor, disc herniation or other compressive pathologies leading to leg weakness  · Holding off on abx for now  · Robaxin scheduled  · Pain regimen ordered  · PT/OT consulted             VTE Pharmacologic Prophylaxis: VTE Score: 1 Moderate Risk (Score 3-4) - Pharmacological DVT Prophylaxis Ordered: enoxaparin (Lovenox)  Patient Centered Rounds: I performed bedside rounds with nursing staff today  Discussions with Specialists or Other Care Team Provider: case management    Education and Discussions with Family / Patient: Updated  (significant other) at bedside  Total Time Spent on Date of Encounter in care of patient: 45 minutes This time was spent on one or more of the following: performing physical exam; counseling and coordination of care; obtaining or reviewing history; documenting in the medical record; reviewing/ordering tests, medications or procedures; communicating with other healthcare professionals and discussing with patient's family/caregivers  Current Length of Stay: 1 day(s)  Current Patient Status: Inpatient   Certification Statement: The patient will continue to require additional inpatient hospital stay due to MRI results  Discharge Plan: Anticipate discharge in 24-48 hrs to discharge location to be determined pending rehab evaluations      Code Status: Level 1 - Full Code    Subjective:   Patient reports his scheduled medications are taking the edge off of his pain so he has not been taking the opiates  Did not sleep last night  Objective:     Vitals:   Temp (24hrs), Av 3 °F (36 8 °C), Min:98 1 °F (36 7 °C), Max:98 5 °F (36 9 °C)    Temp:  [98 1 °F (36 7 °C)-98 5 °F (36 9 °C)] 98 1 °F (36 7 °C)  HR:  [] 103  Resp:  [18] 18  BP: (112-116)/(71-74) 113/74  SpO2:  [94 %-98 %] 94 %  Body mass index is 23 07 kg/m²  Input and Output Summary (last 24 hours): Intake/Output Summary (Last 24 hours) at 2023 1307  Last data filed at 2023 1209  Gross per 24 hour   Intake 360 ml   Output --   Net 360 ml       Physical Exam:   Physical Exam  Vitals and nursing note reviewed  Constitutional:       Comments: Appears uncomfortable but not in acute distress   HENT:      Head: Normocephalic and atraumatic  Cardiovascular:      Rate and Rhythm: Normal rate and regular rhythm  Pulses: Normal pulses  Heart sounds: Normal heart sounds  Pulmonary:      Effort: Pulmonary effort is normal       Breath sounds: Normal breath sounds  Abdominal:      General: Bowel sounds are normal       Palpations: Abdomen is soft  Neurological:      Mental Status: He is alert     Psychiatric:         Mood and Affect: Mood normal          Behavior: Behavior normal           Additional Data:     Labs:  Results from last 7 days   Lab Units 23  0459   WBC Thousand/uL 6 29   HEMOGLOBIN g/dL 13 5   HEMATOCRIT % 39 1   PLATELETS Thousands/uL 245   NEUTROS PCT % 68   LYMPHS PCT % 19   MONOS PCT % 10   EOS PCT % 3     Results from last 7 days   Lab Units 23  0459   SODIUM mmol/L 136   POTASSIUM mmol/L 4 3   CHLORIDE mmol/L 106   CO2 mmol/L 23   BUN mg/dL 19   CREATININE mg/dL 1 08   ANION GAP mmol/L 7   CALCIUM mg/dL 9 0   ALBUMIN g/dL 3 3*   TOTAL BILIRUBIN mg/dL 1 60*   ALK PHOS U/L 152*   ALT U/L 42   AST U/L 85*   GLUCOSE RANDOM mg/dL 104                 Results from last 7 days   Lab Units 23  0459 23  1031   LACTIC ACID mmol/L  --  1 5   PROCALCITONIN ng/ml 0 10  --        Lines/Drains:  Invasive Devices     Peripheral Intravenous Line  Duration           Peripheral IV 05/16/23 Right;Ventral (anterior) Forearm 1 day                      Imaging: No pertinent imaging reviewed  Recent Cultures (last 7 days):   Results from last 7 days   Lab Units 05/16/23  1032 05/16/23  1031   BLOOD CULTURE  Received in Microbiology Lab  Culture in Progress  Received in Microbiology Lab  Culture in Progress  Last 24 Hours Medication List:   Current Facility-Administered Medications   Medication Dose Route Frequency Provider Last Rate   • acetaminophen  650 mg Oral Q6H PRN Christopher Montague MD     • DULoxetine  60 mg Oral Daily Christopher Montague MD     • enoxaparin  40 mg Subcutaneous Q24H Alejandrina Bliss DO     • HYDROmorphone  0 5 mg Intravenous Q4H PRN Alejandrina Bliss DO     • ibuprofen  600 mg Oral Q6H PRN Christopher Montague MD     • leflunomide  20 mg Oral Daily Christopher Montague MD     • methocarbamol  500 mg Oral 4x Daily Alejandrina Bliss DO     • oxyCODONE  10 mg Oral Q4H PRN Alejandrina Bliss DO     • pancrelipase (Lip-Prot-Amyl)  24,000 Units Oral TID With Meals Christopher Montague MD     • pantoprazole  40 mg Oral Early Morning Christopher Montague MD          Today, Patient Was Seen By: Vanna Zhu PA-C    **Please Note: This note may have been constructed using a voice recognition system  **

## 2023-05-17 NOTE — CONSULTS
Orthopedics   Jean Arrington 56 y o  male MRN: [id number]  Unit/Bed#: 417-01    Assessment:  56 y o  male with right knee chronic arthritis (rheumatoid vs osteoarthritis)     Plan:   · Weight bearing as tolerated  right lower extremity  · Ice or compression with ACE as needed for swelling  · PT/OT inpatient as needed; outpatient therapy   · Pain control per primary  · Body mass index is 23 07 kg/m²     · Dispo: Ortho signing off  · No acute orthopedic intervention to the right knee indicated at this time  · Discussion had with patient regarding outpatient management of chronic changes to the knee  · Discussed pain and swelling as related to both history of RA and age-related degenerative changes, and as commonly cyclic as his has been  Discussed results from prior fluid analysis from last episode of swelling  · Can follow with Dr Maura Ye outpatient for orthopedic management    Chief Complaint:   right knee pain/swelling    HPI:   56 y  o male community ambulator complaining of right knee pain  No recent trauma  Evaluated 1 month ago in the ED for knee effusion, when it was drained and sent for analysis; it was cloudy but without concerning white count or bacteria, negative for crystals    Pain is unchanged in character from previous swelling episodes, Located in the popliteal space, acute in onset, constant in duration, severe in intensity  No noted radiating,  numbness, tingling, no open wounds noted  Has had no recent treatment  No other complaints at this time; admitted and being evaluated for back pain/radiculopathy concerning for spinal abscess/discitis  PMH significant for rheumatoid arthritis, on cimziia outpatient      Review Of Systems:   · Skin: Normal  · Neuro: See HPI  · Musculoskeletal: See HPI  · 14 point review of systems negative except as stated above     Past Medical History:   Past Medical History:   Diagnosis Date   • Pancreatitis    • RA (rheumatoid arthritis) ([facility] [address] )    • Rheumatoid arthritis Past Surgical History:   Past Surgical History:   Procedure Laterality Date   • CHOLECYSTECTOMY     • FINGER SURGERY Right     fourth digit       Family History:  Family history reviewed and non-contributory  History reviewed  No pertinent family history      Social History:  Social History     Socioeconomic History   • Marital status: Single     Spouse name: None   • Number of children: None   • Years of education: None   • Highest education level: None   Occupational History   • None   Tobacco Use   • Smoking status: Never   • Smokeless tobacco: Never   Substance and Sexual Activity   • Alcohol use: Never   • Drug use: Never   • Sexual activity: None   Other Topics Concern   • None   Social History Narrative   • None     Social Determinants of Health     Financial Resource Strain: Not on file   Food Insecurity: Not on file   Transportation Needs: Not on file   Physical Activity: Not on file   Stress: Not on file   Social Connections: Not on file   Intimate Partner Violence: Not on file   Housing Stability: Not on file       Allergies:   No Known Allergies        Labs:  0   Lab Value Date/Time    HCT 41 6 05/16/2023 1031    HGB 14 4 05/16/2023 1031    WBC 6 17 05/16/2023 1031    ESR 35 (H) 05/16/2023 1031       Meds:    Current Facility-Administered Medications:   •  acetaminophen (TYLENOL) tablet 650 mg, 650 mg, Oral, Q6H PRN, Carmelita Ortiz MD  •  [START ON 5/17/2023] DULoxetine (CYMBALTA) delayed release capsule 60 mg, 60 mg, Oral, Daily, Carmelita Ortiz MD  •  enoxaparin (LOVENOX) subcutaneous injection 40 mg, 40 mg, Subcutaneous, Q24H, Gena Bliss DO  •  HYDROmorphone (DILAUDID) injection 0 5 mg, 0 5 mg, Intravenous, Q4H PRN, Gena Bliss DO  •  ibuprofen (MOTRIN) tablet 600 mg, 600 mg, Oral, Q6H PRN, Carmelita Ortiz MD  •  [START ON 5/17/2023] leflunomide (ARAVA) tablet 20 mg, 20 mg, Oral, Daily, Carmelita Ortiz MD  •  lidocaine (LIDODERM) 5 % patch 2 patch, 2 patch, Topical, Once, "Shahnaz Schulte MD, 2 patch at 05/16/23 1733  •  methocarbamol (ROBAXIN) tablet 500 mg, 500 mg, Oral, 4x Daily, Yu Bliss DO, 500 mg at 05/16/23 1730  •  oxyCODONE (ROXICODONE) immediate release tablet 10 mg, 10 mg, Oral, Q4H PRN, Yu Bliss DO  •  pancrelipase (Lip-Prot-Amyl) (CREON) delayed release capsule 24,000 Units, 24,000 Units, Oral, TID With Meals, Shahnaz Schulte MD, 24,000 Units at 05/16/23 1805  •  [START ON 5/17/2023] pantoprazole (PROTONIX) EC tablet 40 mg, 40 mg, Oral, Early Morning, Shahnaz Schulte MD    Blood Culture:   Lab Results   Component Value Date    BLOODCX Received in Microbiology Lab  Culture in Progress  05/16/2023       Wound Culture:   No results found for: WOUNDCULT    Ins and Outs:  No intake/output data recorded  Physical Exam:   /71 (BP Location: Right arm)   Pulse 87   Temp 98 5 °F (36 9 °C) (Tympanic)   Resp 18   Ht 6' 2\" (1 88 m)   Wt 81 5 kg (179 lb 10 8 oz)   SpO2 98%   BMI 23 07 kg/m²   Gen: No acute distress, resting comfortably in bed  HEENT: Eyes clear, moist mucus membranes, hearing intact  Respiratory: No audible wheezing or stridor  Cardiovascular: Well Perfused peripherally, 2+ distal pulse  Abdomen: nondistended, no peritoneal signs  Musculoskeletal: right lower extremity  · Skin intact; without erythema or significant warmth  · Tender to palpation over posterior knee  · Minimal effusion  · AROM to 100 degrees flexion and 0 degrees extension  · No micromotion tenderness   · Can perform straight leg raise  · Sensation intact L3-S1  · intact strength to hip flexion/extension, knee flexion/extension, ankle dorsi/plantar flexion, EHL/FHL  · Toes warm and well perfused  · Musculature is soft and compressible  · Leg lengths appear equal    Radiology:   I personally reviewed the films   Films reviewed with Dr Rosina Delgadillo from 4/2/23 of the  right knee shows osteophytic changes with mild degenerative changes; no acute osseous " abnormality at the time    [place], [place]

## 2023-05-17 NOTE — CASE MANAGEMENT
Case Management Assessment & Discharge Planning Note    Patient name Gege Hightower  Location [address] [id number] MRN [id number]  : 1965 Date 2023       Current Admission Date: 2023  Current Admission Diagnosis:Right leg weakness   Patient Active Problem List    Diagnosis Date Noted   • Myalgia 2023   • Right leg weakness 2023   • Effusion of right knee 2023   • Pancreatic abnormality 2019   • Rheumatoid arthritis involving multiple sites with positive rheumatoid factor ([facility] [address] ) 2016      LOS (days): 1  Geometric Mean LOS (GMLOS) (days):   Days to GMLOS:     OBJECTIVE:    Risk of Unplanned Readmission Score: 8 15         Current admission status: Inpatient       Preferred Pharmacy:   [address] [place] [address]  Phone: [phone] Fax: [phone]    [facility] DR JOYCE BIANCHIMARII [address] [place] [address]  Phone: [phone] Fax: [phone]    Primary Care Provider: Beatrice Hoffman PA-C    Primary Insurance: [address] [place] [address]  Secondary Insurance:     ASSESSMENT:  [address] [facility] Proxies    There are no active Health Care Proxies on file  Readmission Root Cause  30 Day Readmission: No    Patient Information  Admitted from[de-identified] Home  Mental Status: Alert  During Assessment patient was accompanied by: Not accompanied during assessment  Assessment information provided by[de-identified] Patient  Primary Caregiver: Self  Support Systems: Spouse/significant other  [place] of Residence: Other (specify in comment box) ([place])  What city do you live in?: [employer] entry access options   Select all that apply : Stairs  Number of steps to enter home : 5  Do the steps have railings?: Yes  Type of Current Residence: [place] (Pt has 13 steps with a chair lift to the basement where the patient's father lives )  In the last 12 months, was there a time when you were not able to pay the mortgage or rent on time?: No  In the last 12 months, how many places have you lived?: 1  In the last 12 months, was there a time when you did not have a steady place to sleep or slept in a shelter (including now)?: No  Homeless/housing insecurity resource given?: N/A  Living Arrangements: Lives w/ Spouse/significant other  Is patient a ?: No    Activities of Daily Living Prior to Admission  Functional Status: Independent  Completes ADLs independently?: Yes  Ambulates independently?: Yes  Does patient use assisted devices?: No  Does patient currently own DME?: Yes  What DME does the patient currently own?: [employer]  Does patient have a history of Outpatient Therapy (PT/OT)?: Yes ([facility])  Does the patient have a history of Short-Term Rehab?: No  Does patient have a history of HHC?: No  Does patient currently have [address]?: No         Patient Information Continued  Income Source: Employed (Pt works full time )  Does patient have prescription coverage?: Yes  Within the past 12 months, you worried that your food would run out before you got the money to buy more : Never true  Within the past 12 months, the food you bought just didn't last and you didn't have money to get more : Never true  Food insecurity resource given?: N/A  Does patient receive dialysis treatments?: No  Does patient have a history of substance abuse?: No  Does patient have a history of Mental Health Diagnosis?: No         Means of Transportation  [facility] Drives Self (Pt and his SO both drive   SO will give the patient a ride home from the hospital )  In the past 12 months, has lack of transportation kept you from medical appointments or from getting medications?: No  In the past 12 months, has lack of transportation kept you from meetings, work, or from getting things needed for daily living?: No  Was application for public transport provided?: N/A        DISCHARGE DETAILS:    Discharge planning discussed with[de-identified] Pt  Freedom of Choice: Yes     CM contacted family/caregiver?: No- see comments (Pt is alert and oriented x3 )            I will await PT and OT's recommendations  I will continue to follow for any Case Management needs

## 2023-05-17 NOTE — ASSESSMENT & PLAN NOTE
· Bilirubin on admission was 1 2, kellie to now 1 6 along with now abdnormal alk phos 152 and AST 85  · ALT still wnl  · Will continue to trend  · Will likely pursue hepatitis panel and RUQ US

## 2023-05-17 NOTE — ASSESSMENT & PLAN NOTE
· On cimzia outpatient, contributing to immunocompromised state which makes month of fevers outpatient more concerning  · Gets occasional prednisone tapers for uncontrolled RA flares, recently just about finished with one and thus no steroids needed at this time

## 2023-05-17 NOTE — TELEMEDICINE
"e-Consult (IPC)     Consults      Contacted by BLAKE Franco 56 y o  male MRN: [id number]  Unit/Bed#: 727-18 Encounter: [id number]    Reason for Consult  Per provider report, patient presented to the ER with several complaints on 5/16 including neck pain, back pain and RLE pain/weakness  Pt with a hx of poorly controlled RA on immunomodulating meds  Pt also reports hx of fevers that primary team was concerned for infection  Given his back pain and neck pain, ordered MRI c-spine and L-spine to rule out osteomyelitis  Neurosurgery asked to review the MRI specifically for concern of a \"hemangioma at L2\"  Per provider report, the patient does not report any significant back pain at this time  He does admit to some posterior neck pain and headaches  Denies any radicular symptoms  Patient reports right knee pain that has been consistent since the beginning of April  Denies any arm or leg weakness, denies any numbness, denies any bowel/bladder incontinence or retention  Per medical team, they have a very low clinical concern for osteomyelitis but want to be sure and would like us to review his imaging  We feel that his infectious source is more likely coming from an intra-abdominal or pulmonary process  Further work-up is undergoing  Blood cultures are pending  Available past medical history,social history, surgical history, medication list, drug allergies and review of systems were reviewed  /87 (BP Location: Right arm)   Pulse 97   Temp 98 4 °F (36 9 °C) (Oral)   Resp 18   Ht 6' 2\" (1 88 m)   Wt 81 5 kg (179 lb 10 8 oz)   SpO2 95%   BMI 23 07 kg/m²      Clinical exam:   (per chart review and provider report)  - GCS 15   - no focal neuro deficits appreciated   - \"R leg weakness\" is not truly leg weakness after discussion with BEVERLY Rodriguez   Pt with intact strength throughout al muscle groups of the RLE, but with significant posterior knee pain that limits him due to " arthritis  - pt ambulating without issue   - carleen UE with intact strength  - no sensory changes reported     Imaging:   · 5/16 CT c-spine: Diffuse disc osteophyte complexes are noted at the C5-6 and C6-7 levels with mild central canal narrowing  · 5/17 MRI c-spine w/wo: No discitis/osteomyelitis or abscess in cervical spine  Multilevel degenerative changes of cervical spine with varying degrees of canal stenosis (mild C5-C6 and C6-C7) and foraminal narrowing (severe left C6-C7)  Partially imaged 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, unchanged  · 5/17 MRI L-spine w/wo: Mixed type I/II Modic endplate change at M2-D4 and L5-S1  Discitis/osteomyelitis favored less likely  No abscess in lumbar spine  Clinical follow-up advised  Multilevel degenerative changes of lumbar spine with varying degrees of canal stenosis (multilevel mild, worse at L3-L4 and L4-L5) and foraminal narrowing (moderate right L3-L4 and left L4-L5)  Assessment and Recommendations  · Continue to closely monitor neuro exam   · Frequent neuro checks per primary team   · Maintain normotensive BP goals, MAP > 65   · No acute neuro surgical intervention indicated at this time   · Case and imaging reviewed   · Very low concern for osteomyelitis given imaging and reported sx/exam/labs   · some mild endplate changes at P3-0 and L5-S1, likely degenerative  No abscess noted  Mild central stenosis and mod foraminal narrowing on the R that could explain some of his RLE pain, however, is more likely explained by his R knee pain/arthiritis as the pain he reports in not radicular  No evidence of abscess or osteo in the cervical spine either  There is TF narrowing the c-spine as well, specifically at C6-7, but again, pt's pain is not reported to be radicular in that nerve root pattern so likely unrelated     · Pt without red flag signs on exam/ROS and with neuro intact exam  Currently denies any back pain  · ESR and CRP are under-whelming, with mild elevation   · likely secondary to his poorly controlled RA   · Small L2 vertebral hemangioma noted and incidental, would not explain sx and does not require further follow-up   · Medical team believes that fevers are more likely arising from an intraabdominal source or a pulmonary source   · Blood cultures remain pending   · Continue workup per primary team   · Continue medical management per primary team   · Pt may follow-up with the nsgy clinic in the outpatient setting  Our contact information was left in the AVS, the pt is encouraged to reach out and schedule an appt as needed  · nsgy will sign off at this time  Please reach out with any further questions or concerns  All questions answered  Provider is in agreement with the course of action  11-20 minutes, >50% of the total time devoted to medical consultative verbal/EMR discussion between providers  Written report will be generated in the EMR

## 2023-05-17 NOTE — OCCUPATIONAL THERAPY NOTE
Occupational Therapy         Patient Name: Favio Steele  Today's Date: 5/17/2023 05/17/23 1325   OT Last Visit   OT Visit Date 05/17/23   Note Type   Note type Screen   Additional Comments OT orders received and chart review completed  Per [facility] [place], pt is ambulating and completing self care tasks independently within his room  OT services not indicated at this time  Will DC orders at this time         Rich Toure

## 2023-05-17 NOTE — ASSESSMENT & PLAN NOTE
· C-spine paraspinal tenderness and shoulder pain/tenderness on examination, RLE posterior thigh to posterior ankle pain/tenderness  Symptoms not reminiscent of prior sciatica of LLE but are accompanied with weakness  · Alongside MRI L-spine, will obtain MRI c-spine  · treating with ibuprofen, acetaminophen, lidocaine patches and methocarbamol  · Also had painful swallowing x1 day PTA, CT neck soft tissue showed no abscess or acute pathology, pain likely correlated with ongoing myalgias in posterior neck  · Possible tension etiology given noted poor sitting posture with head thrust forward

## 2023-05-17 NOTE — PHYSICAL THERAPY NOTE
Physical Therapy Evaluation    Patient Name: Shlomo Luo    Today's Date: 5/17/2023     Problem List  Principal Problem:    Right leg weakness  Active Problems:    Pancreatic abnormality    Rheumatoid arthritis involving multiple sites with positive rheumatoid factor (HCC)    Myalgia    Effusion of right knee    Transaminitis       Past Medical History  Past Medical History:   Diagnosis Date    Pancreatitis     RA (rheumatoid arthritis) ([facility] [address] )     Rheumatoid arthritis         Past Surgical History  Past Surgical History:   Procedure Laterality Date    CHOLECYSTECTOMY      FINGER SURGERY Right     fourth digit         05/17/23 1308   Note Type   Note type Evaluation   Pain Assessment   Pain Assessment Tool 0-10   Pain Score 8   Pain Location/Orientation Location: Neck;Orientation: Bilateral;Location: Shoulder;Orientation: Right;Location: Leg   Restrictions/Precautions   Weight Bearing Precautions Per Order Yes   RLE Weight Bearing Per Order WBAT   Home Living   Type of [facility] [address] Two level;Stairs to enter with rails  (5STE, ranch with basement)   Bathroom Shower/Tub Walk-in shower   Bathroom Toilet Standard   Bathroom Accessibility Accessible   Home Equipment Walker;Cane;Stair glide   Additional Comments no AD at baseline   Prior Function   Level of [place] Independent with ADLs; Independent with functional mobility   Lives With Spouse   Receives Help From Family   IADLs Independent with driving; Independent with meal prep; Independent with medication management   Falls in the last 6 months 0   Vocational Full time employment   Cognition   Overall Cognitive Status WFL   Arousal/Participation Cooperative   Orientation Level Oriented X4   Memory Within functional limits   Following Commands Follows all commands and directions without difficulty   RLE Assessment   RLE Assessment X   Strength RLE   RLE Overall Strength 4-/5   LLE Assessment   LLE Assessment WFL   Coordination   Movements are Fluid and Coordinated 1   Sensation WFL   Light Touch   RLE Light Touch Grossly intact   LLE Light Touch Grossly intact   Bed Mobility   Rolling R 7  Independent   Rolling L 7  Independent   Supine to Sit 7  Independent   Sit to Supine 7  Independent   Transfers   Sit to Stand 7  Independent   Stand to Sit 7  Independent   Stand pivot 7  Independent   Ambulation/Elevation   Gait pattern WNL   Gait Assistance 7  Independent   Additional items Verbal cues   Assistive Device None   Distance 100 ft   Balance   Static Sitting Normal   Dynamic Sitting Normal   Static Standing Good   Dynamic Standing Good   Ambulatory Good   Endurance Deficit   Endurance Deficit No   Activity Tolerance   Activity Tolerance Patient tolerated treatment well   Assessment   Prognosis Good   Problem List Decreased strength;Decreased mobility;Pain   Assessment Pt is 56 y o  male seen for PT evaluation s/p admit to [address] [facility] on 5/16/2023 w/ Right leg weakness  PT consulted to assess pt's functional mobility and d/c needs  Order placed for PT eval and tx, w/ up w/ A order  Comorbidities affecting pt's physical performance at time of assessment include: RA, myalgia  PTA, pt was independent w/ all functional mobility w/ no AD and working full time in a [employer]  Personal factors affecting pt at time of IE include: lives in 2 story house, inability to navigate level surfaces w/o external assistance and inability to perform IADLs  Please find objective findings from PT assessment regarding body systems outlined above  Pt completed bed mobility, transfers, and ambulation 100 ft with no AD independently  No LOB  PT assessed neck and RLE pain  Cervical pain increases bilaterally with rotation  RLE pain increases in severity in posterior right knee but does not radiate with right hip flexion and left knee extension for neural tension testing  No change in pain with right piriformis stretch   Pt reports majority of RLE pain is due to edema in posterior right knee  The following objective measures performed on IE also reveal limitations: Modified [place]: 2 (slight disability)  Pt's clinical presentation is currently evolving  From PT/mobility standpoint, pt is independent and does not require PT services at this time  Recommend continued mobility during hospitalization, and recommendation at time of d/c would be OP PT pending progress in order to facilitate return to PLOF  The patient's raw score on the AM-PAC Daily Activity Inpatient Short Form is 24  A raw score of greater than or equal to 19 suggests the patient may benefit from discharge to home     AM-[facility] Basic Mobility Inpatient   Turning in Flat Bed Without Bedrails 4   Lying on Back to Sitting on Edge of Flat Bed Without Bedrails 4   Moving Bed to Chair 4   Standing Up From Chair Using Arms 4   Walk in Room 4   Climb 3-5 Stairs With Railing 4   Basic Mobility Inpatient Raw Score 24   Basic Mobility Standardized Score 57 68   Highest Level Of Mobility   [facility] Goal 8: Walk 250 feet or more   Modified Beth Scale   Modified [place] Scale 2       RECOMMENDATION:  Outpatient PT

## 2023-05-17 NOTE — UTILIZATION REVIEW
Initial Clinical Review    Admission: Date/Time/Statement:   Admission Orders (From admission, onward)     Ordered        05/16/23 1429  Inpatient Admission  Once                      Orders Placed This Encounter   Procedures   • Inpatient Admission     Standing Status:   Standing     Number of Occurrences:   1     Order Specific Question:   Level of Care     Answer:   Med Surg [16]     Order Specific Question:   Estimated length of stay     Answer:   More than 2 Midnights     Order Specific Question:   Certification     Answer:   I certify that inpatient services are medically necessary for this patient for a duration of greater than two midnights  See H&P and MD Progress Notes for additional information about the patient's course of treatment  ED Arrival Information     Expected   -    Arrival   5/16/2023 09:39    Acuity   Urgent            Means of arrival   Walk-In    Escorted by   [facility]    Admission type   Emergency            Arrival complaint   neck pain radiating into rgt leg           Chief Complaint   Patient presents with   • Neck Pain     Patient reports neck pain for greater than a week  States it radiates from neck to b/l shoulders as well as back of head  Taking ibuprofen without relief  Initial Presentation: 56 y o  male presents to ed from home for evaluation and treatment of neck pain  And radiating pain downright leg  Symptoms have been worsening for some time however the neck pain and swallowing difficulty began a few days ago  R knee drained a week ago  He also reports intermittent fevers and chills  Pt signed out of a different ED AMA and did not have recommended MRI  PMHX: RA   Clinical assessment significant for pain 8/10, posterior pharyngeal erythema  SED 35 Imaging shows left lung tree bud changes  Admit to inpatient med surg for right leg weakness  Consult orthopedics:  right knee chronic arthritis    Pain is unchanged in character from previous swelling episodes  Back pain concerning for spinal abscess/ discitis  No orthopedic intervention required  Date: 5- 17-23  Day 2: inpatient med surg  Pain management treated with  po robaxin  MRI of [place] C  spine ordered to rule out abscess  Observe off antibiotics at this time  PT/OT evaluations completed recommending outpatient therapy  ADDENDUM; MRIs shows DJD MULTI LEVEL  SEVERE STENOSIS AT C6-C7, MODERATE STENOSIS AT L3-L4  L4-L5  Low concern for osteomyelitis  Medical team believes fevers arise from intraabdominal source  No neuro surgery intervention  Follow up outpatient       Date 5-18-23 inpatient med surg  US abdomen is normal  Afebrile    Discharged 5-18    ED Triage Vitals [05/16/23 0946]   97 7 °F (36 5 °C) 72 18 143/72 98 %      Temporal Monitor         Pain Score       8          05/16/23 81 5 kg (179 lb 10 8 oz)     Additional Vital Signs:    Date/Time Temp Pulse Resp BP MAP (mmHg) SpO2 O2 Device   05/17/23 07:32:54 98 1 °F (36 7 °C) 103 18 113/74 87 94 % --   05/16/23 22:25:28 98 4 °F (36 9 °C) 84 -- 116/74 88 95 % --   05/16/23 2132 -- -- -- -- -- 96 % None (Room air)   05/16/23 16:01:16 98 5 °F (36 9 °C) 87 18 112/71 85 98 % None (Room air)   05/16/23 0948 -- -- -- -- -- -- None (Room air)   05/16/23 0946 97 7 °F (36 5 °C) 72 18 143/72 -- 98 % None (Room air)             Pertinent Labs/Diagnostic Test Results:     Date/Time: 5/16/2023 11:03 AM    Indications / Diagnosis:  Neck back pain  ECG reviewed by me, the ED Provider: yes    Patient location:  ED  Previous ECG:     Comparison to cardiac monitor: Yes    Interpretation:     Interpretation: normal    Rate:     ECG rate:  77    ECG rate assessment: normal    Rhythm:     Rhythm: sinus rhythm    Ectopy:     Ectopy: none    QRS:     QRS axis:  Normal  Conduction:     Conduction: abnormal      Abnormal conduction: incomplete RBBB    ST segments:     ST segments:  Normal  T waves:     T waves: normal      • 5/17 MRI c-spine w/wo: No discitis/osteomyelitis or abscess in cervical spine  Multilevel degenerative changes of cervical spine with varying degrees of canal stenosis (mild C5-C6 and C6-C7) and foraminal narrowing (severe left C6-C7)  Partially imaged 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, unchanged  • 5/17 MRI L-spine w/wo: Mixed type I/II Modic endplate change at X4-E8 and L5-S1  Discitis/osteomyelitis favored less likely  No abscess in lumbar spine  Clinical follow-up advised  Multilevel degenerative changes of lumbar spine with varying degrees of canal stenosis (multilevel mild, worse at L3-L4 and L4-L5) and foraminal narrowing (moderate right L3-L4 and left L4-L5)  CT soft tissue neck with contrast   Final (05/16 1131)   1  There is no suspicious mucosal lesion or drainable fluid/abscess collection  2  Small left axillary and intraclavicular lymph nodes which are below size criteria for pathologic adenopathy  3  Mild tree-in-bud type changes within the medial left lung apex, likely infectious/inflammatory in etiology  XR chest 1 view portable   Final  (05/16 1100)      No acute cardiopulmonary disease          Results from last 7 days   Lab Units 05/16/23  1807 05/16/23  1031   SARS-COV-2  Not Detected Negative     Results from last 7 days   Lab Units 05/17/23  0459 05/16/23  1031   WBC Thousand/uL 6 29 6 17   HEMOGLOBIN g/dL 13 5 14 4   HEMATOCRIT % 39 1 41 6   PLATELETS Thousands/uL 245 251   NEUTROS ABS Thousands/µL 4 25 4 58         Results from last 7 days   Lab Units 05/17/23  0459 05/16/23  1031   SODIUM mmol/L 136 137   POTASSIUM mmol/L 4 3 3 8   CHLORIDE mmol/L 106 107   CO2 mmol/L 23 20*   ANION GAP mmol/L 7 10   BUN mg/dL 19 15   CREATININE mg/dL 1 08 1 03   EGFR ml/min/1 73sq m 76 81   CALCIUM mg/dL 9 0 9 3   MAGNESIUM mg/dL 2 2  --    PHOSPHORUS mg/dL 3 4  --      Results from last 7 days   Lab Units 05/17/23  0459 05/16/23  1031   AST U/L 85* 19   ALT U/L 42 22   ALK PHOS U/L 152* 72   TOTAL PROTEIN g/dL 6 1* 6 7   ALBUMIN g/dL 3 3* 3 6   TOTAL BILIRUBIN mg/dL 1 60* 1 21*         Results from last 7 days   Lab Units 05/17/23  0459 05/16/23  1031   GLUCOSE RANDOM mg/dL 104 128       Results from last 7 days   Lab Units 05/17/23  0459   CK TOTAL U/L 52       Results from last 7 days   Lab Units 05/16/23  1031   TSH 3RD GENERATON uIU/mL 1 739     Results from last 7 days   Lab Units 05/17/23  0459   PROCALCITONIN ng/ml 0 10     Results from last 7 days   Lab Units 05/16/23  1031   LACTIC ACID mmol/L 1 5       Results from last 7 days   Lab Units 05/16/23  1031   LIPASE u/L <6*     Results from last 7 days   Lab Units 05/17/23  0459 05/16/23  1031   CRP mg/L 8 7*  --    SED RATE mm/hour  --  35*     Results from last 7 days   Lab Units 05/16/23  1459   CLARITY UA  Clear   COLOR UA  Yellow   SPEC GRAV UA  1 010   PH UA  5 5   GLUCOSE UA mg/dl Negative   KETONES UA mg/dl Negative   BLOOD UA  Negative   PROTEIN UA mg/dl Negative   NITRITE UA  Negative   BILIRUBIN UA  Negative   UROBILINOGEN UA E U /dl 0 2   LEUKOCYTES UA  Negative   WBC UA /hpf None Seen   RBC UA /hpf 0-1*   BACTERIA UA /hpf None Seen   EPITHELIAL CELLS WET PREP /hpf None Seen     Results from last 7 days   Lab Units 05/16/23  1807 05/16/23  1031   INFLUENZA A PCR   --  Negative   INFLUENZA B PCR   --  Negative   RSV PCR   --  Negative   RESPIRATORY SYNCYTIAL VIRUS  Not Detected  --      Results from last 7 days   Lab Units 05/16/23  1807   ADENOVIRUS  Not Detected   BORDETELLA PARAPERTUSSIS  Not Detected   BORDETELLA PERTUSSIS  Not Detected   CHLAMYDIA PNEUMONIAE  Not Detected   CORONAVIRUS 229E  Not Detected   CORONAVIRUS HKU1  Not Detected   CORONAVIRUS NL63  Not Detected   CORONAVIRUS OC43  Not Detected   METAPNEUMOVIRUS  Not Detected   RHINOVIRUS  Not Detected   MYCOPLASMA PNEUMONIAE  Not Detected   PARAINFLUENZA 1  Not Detected   PARAINFLUENZA 2  Not Detected   PARAINFLUENZA 3  Not Detected   PARAINFLUENZA 4  Not Detected Results from last 7 days   Lab Units 05/16/23  1032 05/16/23  1031   BLOOD CULTURE  Received in Microbiology Lab  Culture in Progress  Received in Microbiology Lab  Culture in Progress  ED Treatment:   Medication Administration from 05/16/2023 0938 to 05/16/2023 1529   NONE      Past Medical History:   Diagnosis   • Pancreatitis   • RA (rheumatoid arthritis) ([facility] [address] )   • Rheumatoid arthritis     Present on Admission:  • Myalgia  • Right leg weakness  • Rheumatoid arthritis involving multiple sites with positive rheumatoid factor (HCC)      Admitting Diagnosis:     Neck pain [M54 2]  Fever, unknown origin [R50 9]  Sore throat [J02 9]  Leg weakness [R29 898]    Age/Sex: 56 y o  male    Scheduled Medications:    DULoxetine, 60 mg, Oral, Daily  enoxaparin, 40 mg, Subcutaneous, Q24H  leflunomide, 20 mg, Oral, Daily  methocarbamol, 500 mg, Oral, 4x Daily  pancrelipase (Lip-Prot-Amyl), 24,000 Units, Oral, TID With Meals  pantoprazole, 40 mg, Oral, Early Morning      Continuous IV Infusions:     PRN Meds:  acetaminophen, 650 mg, Oral, Q6H PRN  HYDROmorphone, 0 5 mg, Intravenous, Q4H PRN  ibuprofen, 600 mg, Oral, Q6H PRN  oxyCODONE, 10 mg, Oral, Q4H PRN        IP CONSULT TO ORTHOPEDIC SURGERY    Network Utilization Review Department  ATTENTION: Please call with any questions or concerns to [phone] and carefully listen to the prompts so that you are directed to the right person  All voicemails are confidential   Tea Wynne all requests for admission clinical reviews, approved or denied determinations and any other requests to dedicated fax number below belonging to the campus where the patient is receiving treatment   List of dedicated fax numbers for the Facilities:  [address] [place] [address] DENIALS (Administrative/Medical Necessity) [phone]   [address] (Maternity/NICU/Pediatrics) aBilee Alba [address] [place] [address] [place] [phone] [address] [place] [address] [place]  [facility] [address] [facility] 310 [facility] 134 815 [facility] [phone]

## 2023-05-17 NOTE — ASSESSMENT & PLAN NOTE
· Present for 1 5 weeks, progressively worsening and concerning given backdrop of rheumatoid arthritis with roughly 1 month of fever/chills while being on immunosuppressant injectable  · ESR 35 on admission, no WBC elevation  May be related to pain, right knee swelling discussed below or spinal cord compression, for which MRI L-spine will be obtained to rule out abscess, tumor, disc herniation or other compressive pathologies leading to leg weakness    · Holding off on abx for now  · Robaxin scheduled  · Pain regimen ordered  · PT/OT consulted

## 2023-05-18 VITALS
WEIGHT: 179.68 LBS | OXYGEN SATURATION: 96 % | DIASTOLIC BLOOD PRESSURE: 67 MMHG | SYSTOLIC BLOOD PRESSURE: 123 MMHG | HEART RATE: 77 BPM | RESPIRATION RATE: 18 BRPM | TEMPERATURE: 98.1 F | HEIGHT: 74 IN | BODY MASS INDEX: 23.06 KG/M2

## 2023-05-18 LAB
ALBUMIN SERPL BCP-MCNC: 3.2 G/DL (ref 3.5–5)
ALP SERPL-CCNC: 158 U/L (ref 34–104)
ALT SERPL W P-5'-P-CCNC: 63 U/L (ref 7–52)
ANION GAP SERPL CALCULATED.3IONS-SCNC: 6 MMOL/L (ref 4–13)
AST SERPL W P-5'-P-CCNC: 55 U/L (ref 13–39)
BASOPHILS # BLD AUTO: 0.02 THOUSANDS/ÂΜL (ref 0–0.1)
BASOPHILS NFR BLD AUTO: 1 % (ref 0–1)
BILIRUB DIRECT SERPL-MCNC: 0.31 MG/DL (ref 0–0.2)
BILIRUB SERPL-MCNC: 1.02 MG/DL (ref 0.2–1)
BUN SERPL-MCNC: 19 MG/DL (ref 5–25)
CALCIUM SERPL-MCNC: 9 MG/DL (ref 8.4–10.2)
CHLORIDE SERPL-SCNC: 104 MMOL/L (ref 96–108)
CO2 SERPL-SCNC: 25 MMOL/L (ref 21–32)
CREAT SERPL-MCNC: 1.03 MG/DL (ref 0.6–1.3)
EOSINOPHIL # BLD AUTO: 0.2 THOUSAND/ÂΜL (ref 0–0.61)
EOSINOPHIL NFR BLD AUTO: 5 % (ref 0–6)
ERYTHROCYTE [DISTWIDTH] IN BLOOD BY AUTOMATED COUNT: 12.7 % (ref 11.6–15.1)
GFR SERPL CREATININE-BSD FRML MDRD: 81 ML/MIN/1.73SQ M
GLUCOSE SERPL-MCNC: 156 MG/DL (ref 65–140)
HAV IGM SER QL: NORMAL
HBV CORE IGM SER QL: NORMAL
HBV SURFACE AG SER QL: NORMAL
HCT VFR BLD AUTO: 37.6 % (ref 36.5–49.3)
HCV AB SER QL: NORMAL
HGB BLD-MCNC: 12.9 G/DL (ref 12–17)
IMM GRANULOCYTES # BLD AUTO: 0.01 THOUSAND/UL (ref 0–0.2)
IMM GRANULOCYTES NFR BLD AUTO: 0 % (ref 0–2)
LYMPHOCYTES # BLD AUTO: 0.74 THOUSANDS/ÂΜL (ref 0.6–4.47)
LYMPHOCYTES NFR BLD AUTO: 17 % (ref 14–44)
MCH RBC QN AUTO: 30.5 PG (ref 26.8–34.3)
MCHC RBC AUTO-ENTMCNC: 34.3 G/DL (ref 31.4–37.4)
MCV RBC AUTO: 89 FL (ref 82–98)
MONOCYTES # BLD AUTO: 0.36 THOUSAND/ÂΜL (ref 0.17–1.22)
MONOCYTES NFR BLD AUTO: 8 % (ref 4–12)
NEUTROPHILS # BLD AUTO: 3.07 THOUSANDS/ÂΜL (ref 1.85–7.62)
NEUTS SEG NFR BLD AUTO: 69 % (ref 43–75)
NRBC BLD AUTO-RTO: 0 /100 WBCS
PLATELET # BLD AUTO: 225 THOUSANDS/UL (ref 149–390)
PMV BLD AUTO: 9.2 FL (ref 8.9–12.7)
POTASSIUM SERPL-SCNC: 3.6 MMOL/L (ref 3.5–5.3)
PROT SERPL-MCNC: 5.9 G/DL (ref 6.4–8.4)
RBC # BLD AUTO: 4.23 MILLION/UL (ref 3.88–5.62)
SODIUM SERPL-SCNC: 135 MMOL/L (ref 135–147)
WBC # BLD AUTO: 4.4 THOUSAND/UL (ref 4.31–10.16)

## 2023-05-18 RX ORDER — METHOCARBAMOL 500 MG/1
500 TABLET, FILM COATED ORAL 4 TIMES DAILY
Qty: 120 TABLET | Refills: 0 | Status: SHIPPED | OUTPATIENT
Start: 2023-05-18

## 2023-05-18 RX ORDER — KETOROLAC TROMETHAMINE 30 MG/ML
30 INJECTION, SOLUTION INTRAMUSCULAR; INTRAVENOUS ONCE
Status: COMPLETED | OUTPATIENT
Start: 2023-05-18 | End: 2023-05-18

## 2023-05-18 RX ORDER — DEXAMETHASONE SODIUM PHOSPHATE 10 MG/ML
8 INJECTION, SOLUTION INTRAMUSCULAR; INTRAVENOUS ONCE
Status: COMPLETED | OUTPATIENT
Start: 2023-05-18 | End: 2023-05-18

## 2023-05-18 RX ORDER — OXYCODONE HYDROCHLORIDE 10 MG/1
10 TABLET ORAL EVERY 8 HOURS PRN
Qty: 18 TABLET | Refills: 0 | Status: SHIPPED | OUTPATIENT
Start: 2023-05-18 | End: 2023-06-01

## 2023-05-18 RX ORDER — KETOROLAC TROMETHAMINE 10 MG/1
10 TABLET, FILM COATED ORAL EVERY 6 HOURS PRN
Qty: 60 TABLET | Refills: 0 | Status: SHIPPED | OUTPATIENT
Start: 2023-05-18

## 2023-05-18 RX ORDER — PREDNISONE 5 MG/1
TABLET ORAL
Qty: 70 TABLET | Refills: 0 | Status: SHIPPED | OUTPATIENT
Start: 2023-05-18 | End: 2023-06-15

## 2023-05-18 RX ADMIN — PANCRELIPASE 24000 UNITS: 24000; 76000; 120000 CAPSULE, DELAYED RELEASE PELLETS ORAL at 07:58

## 2023-05-18 RX ADMIN — KETOROLAC TROMETHAMINE 30 MG: 30 INJECTION, SOLUTION INTRAMUSCULAR at 14:26

## 2023-05-18 RX ADMIN — LEFLUNOMIDE 20 MG: 20 TABLET, FILM COATED ORAL at 08:51

## 2023-05-18 RX ADMIN — METHOCARBAMOL 500 MG: 500 TABLET ORAL at 08:51

## 2023-05-18 RX ADMIN — METHOCARBAMOL 500 MG: 500 TABLET ORAL at 12:44

## 2023-05-18 RX ADMIN — PANCRELIPASE 24000 UNITS: 24000; 76000; 120000 CAPSULE, DELAYED RELEASE PELLETS ORAL at 12:44

## 2023-05-18 RX ADMIN — DULOXETINE HYDROCHLORIDE 60 MG: 30 CAPSULE, DELAYED RELEASE ORAL at 08:51

## 2023-05-18 RX ADMIN — DEXAMETHASONE SODIUM PHOSPHATE 8 MG: 10 INJECTION INTRAMUSCULAR; INTRAVENOUS at 14:26

## 2023-05-18 RX ADMIN — PANTOPRAZOLE SODIUM 40 MG: 40 TABLET, DELAYED RELEASE ORAL at 05:38

## 2023-05-18 NOTE — ASSESSMENT & PLAN NOTE
· C-spine paraspinal tenderness and shoulder pain/tenderness on examination, RLE posterior thigh to posterior ankle pain/tenderness  Symptoms not reminiscent of prior sciatica of LLE but are accompanied with weakness  · Alongside MRI L-spine, will obtain MRI c-spine  · treating with ibuprofen, acetaminophen, lidocaine patches and methocarbamol  · Also had painful swallowing x1 day PTA, CT neck soft tissue showed no abscess or acute pathology, pain likely correlated with ongoing myalgias in posterior neck  · Possible tension etiology given noted poor sitting posture with head thrust forward    · Referred patient to outpatient PT/OT with recommendations for ultrasonic massage for muscle spasms

## 2023-05-18 NOTE — ASSESSMENT & PLAN NOTE
· On cimzia outpatient, contributing to immunocompromised state which makes month of fevers outpatient more concerning    · Gets occasional prednisone tapers for uncontrolled RA flares  · Suspect his fevers may be related to his RA as it appears relatively uncontrolled  · Recommend going back to his Rheumatologist  · Discussed with our rheumatology team, he will complete a long steroid taper starting at prednisone 20 mg daily and decreasing by 5 mg every 7 days

## 2023-05-18 NOTE — PLAN OF CARE
Problem: PAIN - ADULT  Goal: Verbalizes/displays adequate comfort level or baseline comfort level  Description: Interventions:  - Encourage patient to monitor pain and request assistance  - Assess pain using appropriate pain scale  - Administer analgesics based on type and severity of pain and evaluate response  - Implement non-pharmacological measures as appropriate and evaluate response  - Consider cultural and social influences on pain and pain management  - Notify physician/advanced practitioner if interventions unsuccessful or patient reports new pain  Outcome: Completed     Problem: SAFETY ADULT  Goal: Patient will remain free of falls  Description: INTERVENTIONS:  - Educate patient/family on patient safety including physical limitations  - Instruct patient to call for assistance with activity   - Consult OT/PT to assist with strengthening/mobility   - Keep Call bell within reach  - Keep bed low and locked with side rails adjusted as appropriate  - Keep care items and personal belongings within reach  - Initiate and maintain comfort rounds  - Make Fall Risk Sign visible to staff  - Offer Toileting every 2 Hours, in advance of need  - Initiate/Maintain bed/chair alarm  - Obtain necessary fall risk management equipment: non skid  - Apply yellow socks and bracelet for high fall risk patients  - Consider moving patient to room near nurses station  Outcome: Completed  Goal: Maintain or return to baseline ADL function  Description: INTERVENTIONS:  -  Assess patient's ability to carry out ADLs; assess patient's baseline for ADL function and identify physical deficits which impact ability to perform ADLs (bathing, care of mouth/teeth, toileting, grooming, dressing, etc )  - Assess/evaluate cause of self-care deficits   - Assess range of motion  - Assess patient's mobility; develop plan if impaired  - Assess patient's need for assistive devices and provide as appropriate  - Encourage maximum independence but intervene and supervise when necessary  - Involve family in performance of ADLs  - Assess for home care needs following discharge   - Consider OT consult to assist with ADL evaluation and planning for discharge  - Provide patient education as appropriate  Outcome: Completed  Goal: Maintains/Returns to pre admission functional level  Description: INTERVENTIONS:  - Perform BMAT or MOVE assessment daily    - Set and communicate daily mobility goal to care team and patient/family/caregiver  - Collaborate with rehabilitation services on mobility goals if consulted  - Perform Range of Motion 3 times a day  - Reposition patient every 3 hours    - Dangle patient 3 times a day  - Stand patient 3 times a day  - Ambulate patient 3 times a day  - Out of bed to chair 3 times a day   - Out of bed for meals 3 times a day  - Out of bed for toileting  - Record patient progress and toleration of activity level   Outcome: Completed

## 2023-05-18 NOTE — PLAN OF CARE
Problem: PAIN - ADULT  Goal: Verbalizes/displays adequate comfort level or baseline comfort level  Description: Interventions:  - Encourage patient to monitor pain and request assistance  - Assess pain using appropriate pain scale  - Administer analgesics based on type and severity of pain and evaluate response  - Implement non-pharmacological measures as appropriate and evaluate response  - Consider cultural and social influences on pain and pain management  - Notify physician/advanced practitioner if interventions unsuccessful or patient reports new pain  Outcome: Progressing     Problem: SAFETY ADULT  Goal: Patient will remain free of falls  Description: INTERVENTIONS:  - Educate patient/family on patient safety including physical limitations  - Instruct patient to call for assistance with activity   - Consult OT/PT to assist with strengthening/mobility   - Keep Call bell within reach  - Keep bed low and locked with side rails adjusted as appropriate  - Keep care items and personal belongings within reach  - Initiate and maintain comfort rounds  - Make Fall Risk Sign visible to staff  - Offer Toileting every 2 Hours, in advance of need  - Initiate/Maintain bed/chair alarm  - Obtain necessary fall risk management equipment: non skid  - Apply yellow socks and bracelet for high fall risk patients  - Consider moving patient to room near nurses station  Outcome: Progressing  Goal: Maintain or return to baseline ADL function  Description: INTERVENTIONS:  -  Assess patient's ability to carry out ADLs; assess patient's baseline for ADL function and identify physical deficits which impact ability to perform ADLs (bathing, care of mouth/teeth, toileting, grooming, dressing, etc )  - Assess/evaluate cause of self-care deficits   - Assess range of motion  - Assess patient's mobility; develop plan if impaired  - Assess patient's need for assistive devices and provide as appropriate  - Encourage maximum independence but intervene and supervise when necessary  - Involve family in performance of ADLs  - Assess for home care needs following discharge   - Consider OT consult to assist with ADL evaluation and planning for discharge  - Provide patient education as appropriate  Outcome: Progressing  Goal: Maintains/Returns to pre admission functional level  Description: INTERVENTIONS:  - Perform BMAT or MOVE assessment daily    - Set and communicate daily mobility goal to care team and patient/family/caregiver  - Collaborate with rehabilitation services on mobility goals if consulted  - Perform Range of Motion 3 times a day  - Reposition patient every 3 hours    - Dangle patient 3 times a day  - Stand patient 3 times a day  - Ambulate patient 3 times a day  - Out of bed to chair 3 times a day   - Out of bed for meals 3 times a day  - Out of bed for toileting  - Record patient progress and toleration of activity level   Outcome: Progressing

## 2023-05-18 NOTE — DISCHARGE SUMMARY
[address] [facility] [address]  Discharge- Jean Arrington 1965, 56 y o  male MRN: [id number]  Unit/Bed#: 304-31 Encounter: [id number]  Primary Care Provider: Nenita Martin PA-C   Date and time admitted to hospital: 5/16/2023  9:41 AM    Transaminitis  Assessment & Plan  · Bilirubin on admission was 1 2, kellie to now 1 6 along with now abdnormal alk phos 152 and AST 85  · Will continue to trend outpatient  · Will likely pursue hepatitis panel and RUQ US  · Both negative for pathology        Effusion of right knee  Assessment & Plan  · Chronic, in April 2023 had joint aspiration and synovial fluid studies that provided symptomatic relief and showed likely inflammatory synovial fluid  · Likely recurrence of chronic inflammation limiting movement and causing pain with active ROM knee  · Treating with general anti-inflammatory treatment including acetaminophen and ibuprofen at this time  · Orthopedics input appreciated, no intervention intended at this time    Myalgia  Assessment & Plan  · C-spine paraspinal tenderness and shoulder pain/tenderness on examination, RLE posterior thigh to posterior ankle pain/tenderness  Symptoms not reminiscent of prior sciatica of LLE but are accompanied with weakness  · Alongside MRI L-spine, will obtain MRI c-spine  · treating with ibuprofen, acetaminophen, lidocaine patches and methocarbamol  · Also had painful swallowing x1 day PTA, CT neck soft tissue showed no abscess or acute pathology, pain likely correlated with ongoing myalgias in posterior neck  · Possible tension etiology given noted poor sitting posture with head thrust forward  · Referred patient to outpatient PT/OT with recommendations for ultrasonic massage for muscle spasms    Rheumatoid arthritis involving multiple sites with positive rheumatoid factor ([address] )  Assessment & Plan  · On cimzia outpatient, contributing to immunocompromised state which makes month of fevers outpatient more concerning    · Gets occasional prednisone tapers for uncontrolled RA flares  · Suspect his fevers may be related to his RA as it appears relatively uncontrolled  · Recommend going back to his Rheumatologist  · Discussed with our rheumatology team, he will complete a long steroid taper starting at prednisone 20 mg daily and decreasing by 5 mg every 7 days    Pancreatic abnormality  Assessment & Plan  · Continue equivalent to home pancreatic enzyme supplementation    * Right leg weakness  Assessment & Plan  · Present for 1 5 weeks, progressively worsening and concerning given backdrop of rheumatoid arthritis with roughly 1 month of fever/chills while being on immunosuppressant injectable  · ESR 35 on admission, no WBC elevation  May be related to pain, right knee swelling discussed below or spinal cord compression, for which MRI L-spine will be obtained to rule out abscess, tumor, disc herniation or other compressive pathologies leading to leg weakness  · Holding off on abx for now  · Robaxin scheduled  · Pain regimen ordered  · PT/OT consulted - no needs  · MRI showing stenosis throughout the spine causing mild to moderate narrowing  · Discussed with neurosurgery, no need for acute intervention  · Will see the patient in the office      Medical Problems     Resolved Problems  Date Reviewed: 5/16/2023   None       Discharging Physician / Practitioner: Ayla Fabian PA-C  PCP: Huey Kenyon PA-C  Admission Date:   Admission Orders (From admission, onward)     Ordered        05/16/23 1429  Inpatient Admission  Once                      Discharge Date: 05/18/23    Consultations During Hospital Stay:  · neurosurgery    Procedures Performed:   · none    Significant Findings / Test Results:   US abdomen complete   Final Result      Post cholecystectomy        Remainder of the examination is normal                   Workstation performed: [id number]         MRI cervical spine w wo contrast   Final Result      No discitis/osteomyelitis or abscess in cervical spine  Multilevel degenerative changes of cervical spine with varying degrees of canal stenosis (mild C5-C6 and C6-C7) and foraminal narrowing (severe left C6-C7), as detailed above  Partially imaged 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, unchanged  Workstation performed: [id number]         MRI lumbar spine w wo contrast   Final Result      Mixed type I/II Modic endplate change at Y3-D6 and L5-S1  Discitis/osteomyelitis favored less likely  No abscess in lumbar spine  Clinical follow-up advised  Multilevel degenerative changes of lumbar spine with varying degrees of canal stenosis (multilevel mild, worse at L3-L4 and L4-L5) and foraminal narrowing (moderate right L3-L4 and left L4-L5), as detailed above  Workstation performed: [id number]         CT soft tissue neck with contrast   Final Result   1  There is no suspicious mucosal lesion or drainable fluid/abscess collection  2  Small left axillary and intraclavicular lymph nodes which are below size criteria for pathologic adenopathy  3  Mild tree-in-bud type changes within the medial left lung apex, likely infectious/inflammatory in etiology  p      Workstation performed: [id number]         XR chest 1 view portable   Final Result      No acute cardiopulmonary disease  Workstation performed: [id number]               Incidental Findings:   · none    Test Results Pending at Discharge (will require follow up):   · none     Outpatient Tests Requested:  · CMP    Complications:  none    Reason for Admission: back pain    Hospital Course:   Marysol Thorpe is a 56 y o  male patient who originally presented to the hospital on 5/16/2023 due to back pain at home  He has been complaining of on and off fevers and chills for the last 1 or so months  Concern is for discitis/osteomyelitis  MRI did not show any signs of infection at this time or abscess    It did show "significant lumbar spinal stenosis due to bulging disks, osteophytes, foraminal narrowing  These are likely the main cause of his back pain  Discussed case with neurosurgery  There is no urgent need for intervention at this time he will follow in the office  Regarding his right knee effusion and fevers, this is more concerning for uncontrolled rheumatologic condition  Discussed with our rheumatologist who recommended a prolonged steroid taper starting at prednisone 20 mg and decreasing by 5 mg every week  He will follow-up with his rheumatologist in the office  Please see above list of diagnoses and related plan for additional information  Condition at Discharge: stable    Discharge Day Visit / Exam:   Subjective:  Patient continues to have back pains but states they are tolerable  Vitals: Blood Pressure: 123/67 (05/18/23 0710)  Pulse: 77 (05/18/23 0710)  Temperature: 98 1 °F (36 7 °C) (05/18/23 0710)  Temp Source: Oral (05/18/23 0710)  Respirations: 18 (05/18/23 0710)  Height: 6' 2\" (188 cm) (05/16/23 1601)  Weight - Scale: 81 5 kg (179 lb 10 8 oz) (05/16/23 1601)  SpO2: 96 % (05/18/23 0710)  Exam:   Physical Exam  Vitals and nursing note reviewed  Constitutional:       General: He is not in acute distress  Appearance: He is not ill-appearing  HENT:      Head: Normocephalic and atraumatic  Cardiovascular:      Rate and Rhythm: Normal rate and regular rhythm  Pulses: Normal pulses  Heart sounds: Normal heart sounds  Pulmonary:      Effort: Pulmonary effort is normal       Breath sounds: Normal breath sounds  Abdominal:      General: Bowel sounds are normal       Palpations: Abdomen is soft  Musculoskeletal:      Right lower leg: No edema  Left lower leg: No edema  Skin:     General: Skin is warm and dry  Neurological:      General: No focal deficit present  Mental Status: He is alert and oriented to person, place, and time  Mental status is at baseline   " Psychiatric:         Mood and Affect: Mood normal          Behavior: Behavior normal           Discussion with Family: Updated  (significant other) at bedside  Discharge instructions/Information to patient and family:   See after visit summary for information provided to patient and family  Provisions for Follow-Up Care:  See after visit summary for information related to follow-up care and any pertinent home health orders  Disposition:   Home    Planned Readmission: none     Discharge Statement:  I spent 69 minutes discharging the patient  This time was spent on the day of discharge  I had direct contact with the patient on the day of discharge  Greater than 50% of the total time was spent examining patient, answering all patient questions, arranging and discussing plan of care with patient as well as directly providing post-discharge instructions  Additional time then spent on discharge activities  Discharge Medications:  See after visit summary for reconciled discharge medications provided to patient and/or family        **Please Note: This note may have been constructed using a voice recognition system**

## 2023-05-18 NOTE — ASSESSMENT & PLAN NOTE
· Bilirubin on admission was 1 2, kellie to now 1 6 along with now abdnormal alk phos 152 and AST 85  · Will continue to trend outpatient  · Will likely pursue hepatitis panel and RUQ US  · Both negative for pathology

## 2023-05-18 NOTE — ASSESSMENT & PLAN NOTE
· Present for 1 5 weeks, progressively worsening and concerning given backdrop of rheumatoid arthritis with roughly 1 month of fever/chills while being on immunosuppressant injectable  · ESR 35 on admission, no WBC elevation  May be related to pain, right knee swelling discussed below or spinal cord compression, for which MRI L-spine will be obtained to rule out abscess, tumor, disc herniation or other compressive pathologies leading to leg weakness    · Holding off on abx for now  · Robaxin scheduled  · Pain regimen ordered  · PT/OT consulted - no needs  · MRI showing stenosis throughout the spine causing mild to moderate narrowing  · Discussed with neurosurgery, no need for acute intervention  · Will see the patient in the office

## 2023-05-19 NOTE — UTILIZATION REVIEW
NOTIFICATION OF ADMISSION DISCHARGE   This is a Notification of Discharge from [facility]  Please be advised that this patient has been discharge from our facility  Below you will find the admission and discharge date and time including the patient’s disposition  UTILIZATION REVIEW CONTACT:  Glendia Bernheim  Utilization   Network Utilization Review Department  Phone: [phone] x carefully listen to the prompts  All voicemails are confidential   Email: [email or web address]     ADMISSION INFORMATION  PRESENTATION DATE: 5/16/2023  9:41 AM  OBERVATION ADMISSION DATE:   INPATIENT ADMISSION DATE: 5/16/23  2:29 PM   DISCHARGE DATE: 5/18/2023  2:55 PM   DISPOSITION:Home/Self Care    IMPORTANT INFORMATION:  Send all requests for admission clinical reviews, approved or denied determinations and any other requests to dedicated fax number below belonging to the campus where the patient is receiving treatment   List of dedicated fax numbers:  [address] DENIALS (Administrative/Medical Necessity) [phone]   [address] (Maternity/NICU/Pediatrics) [phone]   [facility] [phone]   [place] [address] [phone]   [address] [phone]   [address] [facility] [phone]   [address] [place] [address] [phone]   [facility] 119 [phone]   [facility]  [phone]   [address] [facility] [phone]   [address] [facility] [address] [facility] [phone]

## 2023-05-21 LAB
BACTERIA BLD CULT: NORMAL
BACTERIA BLD CULT: NORMAL
MRSA NOSE QL CULT: NORMAL

## 2023-05-22 ENCOUNTER — OFFICE VISIT (OUTPATIENT)
Dept: NEUROSURGERY | Facility: CLINIC | Age: 58
End: 2023-05-22

## 2023-05-22 VITALS
HEART RATE: 68 BPM | HEIGHT: 74 IN | TEMPERATURE: 98.3 F | WEIGHT: 179 LBS | RESPIRATION RATE: 18 BRPM | BODY MASS INDEX: 22.97 KG/M2 | DIASTOLIC BLOOD PRESSURE: 70 MMHG | SYSTOLIC BLOOD PRESSURE: 128 MMHG

## 2023-05-22 DIAGNOSIS — M54.16 LUMBAR RADICULOPATHY: Primary | ICD-10-CM

## 2023-05-22 DIAGNOSIS — M54.2 NECK PAIN: ICD-10-CM

## 2023-05-22 DIAGNOSIS — M48.061 LUMBAR STENOSIS: ICD-10-CM

## 2023-05-22 DIAGNOSIS — M54.50 LOWER BACK PAIN: ICD-10-CM

## 2023-05-22 NOTE — PROGRESS NOTES
Neurosurgery Office Note  Maury Powell 56 y o  male MRN: [id number]    Assessment/Plan   Neck pain  Pt presents to the  nsgy clinic as a new consult for evaluation of neck pain with LUE radiculopathy x ~ 6months  - pt reports several month hx of neck pain with intermittent radiation of a shooting pain to his LUE to about the elbow   - no recent injuries   - denies any weakness, numbness, dropping items, falls   - pt was recently admitted for neck and back pain at [facility] and referred to nsgy for output follow-up  Was started on pain medications this visit with improvement in pain     Imaging:   · 5/17 MRI c-spine: No discitis/osteomyelitis or abscess in cervical spine  Multilevel degenerative changes of cervical spine with varying degrees of canal stenosis (mild C5-C6 and C6-C7) and foraminal narrowing (severe left C6-C7),     Plan:   · Continue to closely monitor neurological symptoms  · Patient and wife educated on red flag signs to monitor for, including weakness, numbness, dropping items, incontinence, falls, etc   · Imaging reviewed at length with both patient and wife  · No cord compression or cord signal changes noted on MRI C-spine  Some mild central canal stenosis  Some transforaminal stenosis, most severe at left C6-7  · Given patient's complaints with LUE radiculopathy, the C6-7 transforaminal stenosis could account for that  · However, patient remains neurologically intact with no red flag signs on exam/history and has not tried any conservative measures at this time  · Recommend continued conservative management  · Referral placed to physical therapy  · Referral placed to pain management  · Patient encouraged to try possible injections at the C6-7 nerve root for potential alleviation of his pain  · Continue multimodal pain regimen as prescribed upon discharge from the hospital recently as this is significantly improved patient's symptoms    · Patient currently taking Cymbalta, Robaxin, completing "a steroid taper, and as needed Toradol p o  · Pt encouraged to continue to follow-up with pain management for further management of this regimen  · Patient encouraged to continue to follow-up with his PCP and rheumatologist in regard to his rheumatoid arthritis  · All questions and concerns answered at this time  · Patient and wife encouraged to reach out with any further questions or concerns  · Patient will follow up with the neurosurgery clinic on an as-needed basis or should he fail conservative management as detailed above  Lower back pain  Patient presents the neurosurgery clinic as a new consult for evaluation of chronic LBP w/ RLE radiculopathy x 20+ years    -Patient and wife report a longtime history of low back pain  -Patient has trialed physical therapy as well as REZA in the past but has been several years and he is unsure if he had significant relief   -Patient recently hospitalized at outside hospital for an acute exacerbation of low back pain as well as neck pain  He was started on a multimodal pain regimen upon discharge which is significantly reduced his pain  -Denies any weakness, numbness, bowel/bladder incontinence, ambulatory dysfunction  Imaging:   · 5/17 MRI L-spine: Mixed type I/II Modic endplate change at X6-P2 and L5-S1  Discitis/osteomyelitis favored less likely  No abscess in lumbar spine  Clinical follow-up advised  Multilevel degenerative changes of lumbar spine with varying degrees of canal stenosis (multilevel mild, worse at L3-L4 and L4-L5) and foraminal narrowing (moderate right L3-L4 and left L4-L5),     Plan:   · Continue to closely monitor neurologic symptoms  · Patient and wife educated on \"red flag\" signs and symptoms to monitor closely for, including weakness, numbness, bowel/bladder incontinence, inability to walk    · MRI reviewed at length with patient and wife  · Recommend continued conservative management at this time, similar to what is detailed " above  · Referral placed to PT  · Referral placed to outpatient pain management  · Patient encouraged to continue on his multimodal pain regimen and follow-up with pain management for further recommendations and prescriptions  · Patient encouraged to follow-up in the neurosurgery clinic on an as needed basis should he fail conservative management   · All questions answered at this time  Patient and wife in agreement with this plan  Diagnoses and all orders for this visit:    Lumbar radiculopathy  -     Ambulatory Referral to Physical Therapy; Future  -     Ambulatory Referral to Pain Management; Future    Lumbar stenosis  -     Ambulatory Referral to Neurosurgery    Neck pain    Lower back pain        I have spent a total time of 60 minutes on 05/22/23 in caring for this patient including Diagnostic results, Risks and benefits of tx options, Instructions for management, Patient and family education, Importance of tx compliance, Risk factor reductions, Impressions, Counseling / Coordination of care, Documenting in the medical record, Reviewing / ordering tests, medicine, procedures   and Obtaining or reviewing history    CHIEF COMPLAINT  Chief Complaint   Patient presents with   • New Patient Visit       HISTORY  Patient is a 71-year-old male with a history of rheumatoid arthritis currently on immunomodulating therapy who presents to the neurosurgery clinic today as a new consult in regards to his neck pain and chronic low back pain  Patient was recently hospitalized last week for acute exacerbations of both his neck and back pain  He underwent extensive work-up including an MRI C-spine and MRI L-spine to rule out osteomyelitis as he was having some low-grade fevers  Infectious work-up was negative  Patient was started on a multimodal pain regimen in the hospital with significant reduction in his pain  This regimen includes Cymbalta, p o   Toradol, prednisone taper, and Robaxin, as well as a as needed opioid  Patient states he has not been using the opioid medications  Patient was cleared for discharge to home with recommendations to follow-up with neurosurgery as an outpatient  Patient reports bilateral neck pain since December 2022  Patient describes his neck pain is a constant aching sensation to his posterior neck worse with certain movement such as turning his head and looking over his shoulder  Patient admits to associated left upper extremity radiculopathy down to the elbow  Denies any weakness, numbness, frequent falls, dropping items  Patient denies any numbness or tingling in his hands  Patient states the pain medications he was recently started on in the hospital have been significantly helping his pain  Patient states he has never had any cervical surgeries  Patient states he has never seen pain management or done physical therapy for his neck  Patient reports low back pain times more than 20 years  Patient states that over his lifetime, his back pain has been fluctuating getting worse and getting better  Patient states that over the last few months his back pain has been getting worse  Patient notes intermittent radiation of this pain to his right posterior thigh down to the knee  Patient denies any radiation of this pain into his foot or into his left leg  Patient denies any inability to walk but states that sometimes he feels he is limping due to this pain  Patient does not use a cane or a walker  Patient denies any weakness, numbness, bowel/bladder incontinence, saddle anesthesia  As it did for his neck pain, patient states that the pain regimen he was discharged in the hospital on his significantly improved his pain  Patient denies any lumbar surgeries  Patient does state that he was seen by pain management for his low back pain a number of years ago and trialed REZA  Patient is unsure if that actually improved his symptoms at the time    Patient states he has not done physical therapy in the long time for his back pain  See Discussion    REVIEW OF SYSTEMS  Review of Systems   Musculoskeletal: Positive for back pain, gait problem and neck pain  No previous spine surgeries     Upper & LBP radiates R Leg w some numbness and weakness and difficult walk x 1 month - rates pain 5/10 Neck into b/l Shoulders ( started in Dec 2022)  to LBP down back of Right leg to knee ( weakness) x years     meds Cymbalta,Toradol,robaxin,oxycodone and prednison    PT/REZA/PM none    Neurological: Positive for weakness and numbness  All other systems reviewed and are negative  ROS obtained by MA  Reviewed  See HPI  Meds/Allergies   Current Outpatient Medications   Medication Sig Dispense Refill   • Amylase-Lipase-Protease (PANCRELIPASE 30133 PO) Take by mouth     • Certolizumab Pegol (CIMZIA PREFILLED SC) Inject under the skin     • DULoxetine (CYMBALTA) 60 mg delayed release capsule Take 60 mg by mouth daily     • ketorolac (TORADOL) 10 mg tablet Take 1 tablet (10 mg total) by mouth every 6 (six) hours as needed for moderate pain 60 tablet 0   • leflunomide (ARAVA) 10 MG tablet Take 10 mg by mouth 2 (two) times a day     • methocarbamol (ROBAXIN) 500 mg tablet Take 1 tablet (500 mg total) by mouth 4 (four) times a day 120 tablet 0   • oxyCODONE (ROXICODONE) 10 MG TABS Take 1 tablet (10 mg total) by mouth every 8 (eight) hours as needed for severe pain Max Daily Amount: 30 mg 18 tablet 0   • pantoprazole (PROTONIX) 40 mg tablet Take 40 mg by mouth daily     • predniSONE 5 mg tablet Take 4 tablets (20 mg total) by mouth daily for 7 days, THEN 3 tablets (15 mg total) daily for 7 days, THEN 2 tablets (10 mg total) daily for 7 days, THEN 1 tablet (5 mg total) daily for 7 days  70 tablet 0     No current facility-administered medications for this visit         No Known Allergies    PAST HISTORY  Past Medical History:   Diagnosis Date   • Pancreatitis    • RA (rheumatoid arthritis) ([facility] [address] ) "  • Rheumatoid arthritis      Past Surgical History:   Procedure Laterality Date   • CHOLECYSTECTOMY     • FINGER SURGERY Right     fourth digit     Social History     Tobacco Use   • Smoking status: Never   • Smokeless tobacco: Never   Substance Use Topics   • Alcohol use: Never   • Drug use: Never     No family history on file  Above history personally reviewed  EXAM  Vitals:Blood pressure 128/70, pulse 68, temperature 98 3 °F (36 8 °C), temperature source Temporal, resp  rate 18, height 6' 2\" (1 88 m), weight 81 2 kg (179 lb)  ,Body mass index is 22 98 kg/m²  Physical Exam  Constitutional:       General: He is not in acute distress  Appearance: Normal appearance  He is normal weight  He is not ill-appearing or toxic-appearing  Comments: Pleasant, middle-aged male sitting comfortably in the chair  No acute distress  HENT:      Head: Normocephalic and atraumatic  Right Ear: External ear normal       Left Ear: External ear normal       Nose: Nose normal  No congestion or rhinorrhea  Mouth/Throat:      Mouth: Mucous membranes are moist    Eyes:      General: No scleral icterus  Right eye: No discharge  Left eye: No discharge  Extraocular Movements: Extraocular movements intact  Conjunctiva/sclera: Conjunctivae normal       Pupils: Pupils are equal, round, and reactive to light  Neck:      Comments: No posterior C-spine tenderness appreciated  Cardiovascular:      Rate and Rhythm: Normal rate  Pulmonary:      Effort: Pulmonary effort is normal  No respiratory distress  Comments: No respiratory distress on room air  Abdominal:      General: Abdomen is flat  Musculoskeletal:         General: No tenderness, deformity or signs of injury  Normal range of motion  Cervical back: Normal range of motion and neck supple  No rigidity or tenderness  Right lower leg: No edema  Left lower leg: No edema        Comments: No reproducible L-spine or hip " tenderness appreciated   Skin:     General: Skin is warm and dry  Capillary Refill: Capillary refill takes less than 2 seconds  Findings: No lesion or rash  Neurological:      General: No focal deficit present  Mental Status: He is alert and oriented to person, place, and time  Mental status is at baseline  Cranial Nerves: No cranial nerve deficit  Sensory: No sensory deficit  Motor: No weakness  Coordination: Coordination normal       Gait: Gait normal       Deep Tendon Reflexes: Reflexes normal       Comments: GCS 15   A&Ox3   Appropriately answering questions and following commands   No dysarthria or aphasia   No appreciated CN deficits   Strength 5/5 throughout to carleen UE and carleen LE   Sensation intact to light touch to carleen UE and carleen LE   No drift or ataxia appreciated carleen   Reflexes 2+ bilaterally throughout  No Taylor's or clonus appreciated   Psychiatric:         Mood and Affect: Mood normal          Behavior: Behavior normal          Thought Content: Thought content normal          Judgment: Judgment normal          Neurologic Exam     Mental Status   Oriented to person, place, and time  Cranial Nerves     CN III, IV, VI   Pupils are equal, round, and reactive to light  MEDICAL DECISION MAKING  Imaging Studies:     XR chest 1 view portable    Result Date: 5/16/2023  Narrative: CHEST INDICATION:   fevers  COMPARISON:  None  EXAM PERFORMED/VIEWS:  XR CHEST PORTABLE  FINDINGS: Cardiomediastinal silhouette normal  Lungs clear  No effusion or pneumothorax  Nodule projecting over the left base is a nipple  Upper abdomen normal  Bones normal for age  Impression: No acute cardiopulmonary disease  Workstation performed: [id number]     CT soft tissue neck with contrast    Result Date: 5/16/2023  Narrative: CT NECK WITH CONTRAST INDICATION:   Left anterior neck pain, the left anterior neck pain, pain with swallowing, ongoing fevers  COMPARISON:  None   TECHNIQUE: Axial, sagittal, and coronal 2D reformatted images were created from the axial source data and submitted for interpretation  Radiation dose length product (DLP) for this visit:  617 98 mGy-cm   This examination, like all CT scans performed in the [facility], was performed utilizing techniques to minimize radiation dose exposure, including the use of iterative  reconstruction and automated exposure control  IV Contrast:  85 mL of iohexol (OMNIPAQUE) IMAGE QUALITY:  Diagnostic  FINDINGS: VISUALIZED BRAIN PARENCHYMA:  No acute intracranial pathology of the visualized brain parenchyma  VISUALIZED ORBITS: Normal visualized orbits  PARANASAL SINUSES: Mild right maxillary sinus mucosal thickening  The remainder of the visualized paranasal sinus cavities and mastoid air cells are clear NASAL CAVITY AND NASOPHARYNX:  Normal  Beam hardening artifacts related to dental amalgam/metal limiting evaluation of the oral cavity and oropharynx  SUPRAHYOID NECK:  Normal oral cavity, tongue base, tonsillar fossa and epiglottis  INFRAHYOID NECK:  Aryepiglottic folds and piriform sinuses are normal   Normal glottis and subglottic airway  THYROID GLAND:  Unremarkable  PAROTID AND SUBMANDIBULAR GLANDS:  Normal  LYMPH NODES: Small left axillary and intraclavicular lymph nodes which are below size criteria for pathologic adenopathy  VASCULAR STRUCTURES:  Normal enhancement of the cervical vasculature  THORACIC INLET: Mild tree-in-bud type changes within the medial left lung apex, likely infectious/inflammatory in etiology  BONY STRUCTURES: Diffuse disc osteophyte complexes are noted at the C5-6 and C6-7 levels with mild central canal narrowing  Impression: 1  There is no suspicious mucosal lesion or drainable fluid/abscess collection  2  Small left axillary and intraclavicular lymph nodes which are below size criteria for pathologic adenopathy   3  Mild tree-in-bud type changes within the medial left lung apex, likely infectious/inflammatory in etiology  p Workstation performed: [id number]     MRI cervical spine w wo contrast    Result Date: 5/17/2023  Narrative: MRI CERVICAL SPINE WITH AND WITHOUT CONTRAST INDICATION: discitis  COMPARISON: CT soft tissue neck with contrast 5/16/2023  TECHNIQUE:  Multiplanar, multisequence imaging of the cervical spine was performed before and after gadolinium administration    IV Contrast:  8 mL of Gadobutrol injection (SINGLE-DOSE) IMAGE QUALITY:  Diagnostic  FINDINGS: ALIGNMENT:  Normal alignment of the cervical spine  No compression fracture  No subluxation  No scoliosis  MARROW SIGNAL: Type II Modic endplate change C2-T8 and C6-C7  Otherwise, normal bone marrow signal  CERVICAL AND VISUALIZED UPPER THORACIC CORD:  Normal signal within the visualized cord  PREVERTEBRAL AND PARASPINAL SOFT TISSUES:  Normal  VISUALIZED POSTERIOR FOSSA:  The visualized posterior fossa demonstrates no abnormal signal  CERVICAL DISC SPACES: Multilevel osteophytes, disc height loss, and uncovertebral hypertrophy  C1-C2: Normal  C2-C3: Tiny central disc protrusion  No significant canal stenosis or foraminal narrowing  C3-C4: Tiny central disc protrusion  No significant canal stenosis or foraminal narrowing  C4-C5: Diffuse disc bulge, small central disc protrusion with extension into right subarticular zone  No significant canal stenosis or foraminal narrowing  C5-C6: Diffuse disc bulge, small left subarticular disc protrusion  Uncovertebral hypertrophy  Mild canal stenosis  Mild right and moderate left foraminal narrowing  C6-C7: Diffuse disc bulge  Uncovertebral hypertrophy  Mild canal stenosis  Mild right and severe left foraminal narrowing  C7-T1: Normal  UPPER THORACIC DISC SPACES:  Normal  POSTCONTRAST IMAGING:  Normal  No organized rim-enhancing fluid collection to suggest abscess  OTHER FINDINGS: Partially imaged 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, unchanged       Impression: No discitis/osteomyelitis or abscess in cervical spine  Multilevel degenerative changes of cervical spine with varying degrees of canal stenosis (mild C5-C6 and C6-C7) and foraminal narrowing (severe left C6-C7), as detailed above  Partially imaged 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, unchanged  Workstation performed: [id number]     MRI lumbar spine w wo contrast    Result Date: 5/17/2023  Narrative: MRI LUMBAR SPINE WITH AND WITHOUT CONTRAST INDICATION: discitis  COMPARISON: Portable chest radiograph 5/16/2023  TECHNIQUE:  Multiplanar, multisequence imaging of the lumbar spine was performed before and after gadolinium administration    IV Contrast:  8 mL of Gadobutrol injection (SINGLE-DOSE) IMAGE QUALITY:  Diagnostic FINDINGS: VERTEBRAL BODIES:  There are 5 lumbar type vertebral bodies  Normal alignment of the lumbar spine  No spondylolysis or spondylolisthesis  No scoliosis  No compression fracture  Small L2 intraosseous vertebral body hemangioma  Mixed type I/II Modic endplate change and J2-O6 and L5-S1  Otherwise, normal bone marrow signal  SACRUM:  Normal signal within the sacrum  No evidence of insufficiency or stress fracture  DISTAL CORD AND CONUS:  Normal size and signal within the distal cord and conus  PARASPINAL SOFT TISSUES:  Paraspinal soft tissues are unremarkable  LOWER THORACIC DISC SPACES: Mild noncompressive lower thoracic degenerative change  LUMBAR DISC SPACES: Multilevel osteophytes, disc height loss and facet arthropathy  Tiny subchondral cystic change at posterior superior endplate of L4, likely degenerative  L1-L2: Facet arthropathy  No significant canal stenosis or foraminal narrowing  L2-L3: Facet arthropathy  No significant canal stenosis or foraminal narrowing  L3-L4: Diffuse disc bulge, narrowed bilateral subarticular zones right foraminal/extraforaminal disc osteophyte complex contacting right L3 exiting nerve  Facet arthropathy, ligamentum flavum thickening   Mild canal stenosis  Moderate right foraminal narrowing  L4-L5: Diffuse disc bulge, small central disc protrusion, narrowed bilateral subarticular zones, left foraminal/extraforaminal disc osteophyte complex contacting left L4 exiting nerve  Facet arthropathy  Mild canal stenosis  Moderate left foraminal narrowing  L5-S1: Diffuse disc bulge, marginal endplate osteophytes, narrowed bilateral subarticular zones, and contact of bilateral L5 exiting nerves  Facet arthropathy and trace right facet joint effusion  Mild canal stenosis  Mild bilateral foraminal narrowing  POSTCONTRAST IMAGING:  No abnormal enhancement  OTHER FINDINGS:  None  Impression: Mixed type I/II Modic endplate change at L3-W9 and L5-S1  Discitis/osteomyelitis favored less likely  No abscess in lumbar spine  Clinical follow-up advised  Multilevel degenerative changes of lumbar spine with varying degrees of canal stenosis (multilevel mild, worse at L3-L4 and L4-L5) and foraminal narrowing (moderate right L3-L4 and left L4-L5), as detailed above  Workstation performed: [id number]     US abdomen complete    Result Date: 5/18/2023  Narrative: ABDOMEN ULTRASOUND, COMPLETE INDICATION:   fever of unknown origin, elevation in LFTs  COMPARISON:  None TECHNIQUE:   Real-time ultrasound of the abdomen was performed with a curvilinear transducer with both volumetric sweeps and still imaging techniques  FINDINGS: PANCREAS:  Visualized portions of the pancreas are within normal limits  AORTA AND IVC:  Visualized portions are normal for patient age  LIVER: Size:  Within normal range  The liver measures 13 1 cm in the midclavicular line  Contour:  Surface contour is smooth  Parenchyma:  Echogenicity and echotexture are within normal limits  No liver mass identified  Limited imaging of the main portal vein shows it to be patent and hepatopetal  BILIARY: Post cholecystectomy  No intrahepatic biliary dilatation  CBD measures 3 0 mm  No choledocholithiasis   KIDNEY: Right kidney measures 11 0 x 5 8 x 5 9  cm  Volume 195 8 mL Kidney within normal limits  Left kidney measures 11 4 x 4 8 x 4 6 cm  Volume 133 4 mL Kidney within normal limits  SPLEEN: Measures 9 8 x 10 4 x 4 2 cm  Volume 226 9 mL Within normal limits  ASCITES:  None  Impression: Post cholecystectomy  Remainder of the examination is normal  Workstation performed: [id number]       I have personally reviewed pertinent reports

## 2023-05-22 NOTE — ASSESSMENT & PLAN NOTE
Pt presents to the  nsgy clinic as a new consult for evaluation of neck pain with LUE radiculopathy x ~ 6months  - pt reports several month hx of neck pain with intermittent radiation of a shooting pain to his LUE to about the elbow   - no recent injuries   - denies any weakness, numbness, dropping items, falls   - pt was recently admitted for neck and back pain at [facility] and referred to nsgy for output follow-up  Was started on pain medications this visit with improvement in pain     Imaging:   · 5/17 MRI c-spine: No discitis/osteomyelitis or abscess in cervical spine  Multilevel degenerative changes of cervical spine with varying degrees of canal stenosis (mild C5-C6 and C6-C7) and foraminal narrowing (severe left C6-C7),     Plan:   · Continue to closely monitor neurological symptoms  · Patient and wife educated on red flag signs to monitor for, including weakness, numbness, dropping items, incontinence, falls, etc   · Imaging reviewed at length with both patient and wife  · No cord compression or cord signal changes noted on MRI C-spine  Some mild central canal stenosis  Some transforaminal stenosis, most severe at left C6-7  · Given patient's complaints with LUE radiculopathy, the C6-7 transforaminal stenosis could account for that  · However, patient remains neurologically intact with no red flag signs on exam/history and has not tried any conservative measures at this time  · Recommend continued conservative management  · Referral placed to physical therapy  · Referral placed to pain management  · Patient encouraged to try possible injections at the C6-7 nerve root for potential alleviation of his pain  · Continue multimodal pain regimen as prescribed upon discharge from the hospital recently as this is significantly improved patient's symptoms  · Patient currently taking Cymbalta, Robaxin, completing a steroid taper, and as needed Toradol p o    · Pt encouraged to continue to follow-up with pain management for further management of this regimen  · Patient encouraged to continue to follow-up with his PCP and rheumatologist in regard to his rheumatoid arthritis  · All questions and concerns answered at this time  · Patient and wife encouraged to reach out with any further questions or concerns  · Patient will follow up with the neurosurgery clinic on an as-needed basis or should he fail conservative management as detailed above

## 2023-05-22 NOTE — ASSESSMENT & PLAN NOTE
"Patient presents the neurosurgery clinic as a new consult for evaluation of chronic LBP w/ RLE radiculopathy x 20+ years    -Patient and wife report a longtime history of low back pain  -Patient has trialed physical therapy as well as [facility] in the past but has been several years and he is unsure if he had significant relief   -Patient recently hospitalized at outside hospital for an acute exacerbation of low back pain as well as neck pain  He was started on a multimodal pain regimen upon discharge which is significantly reduced his pain  -Denies any weakness, numbness, bowel/bladder incontinence, ambulatory dysfunction  Imaging:   · 5/17 MRI L-spine: Mixed type I/II Modic endplate change at A7-K9 and L5-S1  Discitis/osteomyelitis favored less likely  No abscess in lumbar spine  Clinical follow-up advised  Multilevel degenerative changes of lumbar spine with varying degrees of canal stenosis (multilevel mild, worse at L3-L4 and L4-L5) and foraminal narrowing (moderate right L3-L4 and left L4-L5),     Plan:   · Continue to closely monitor neurologic symptoms  · Patient and wife educated on \"red flag\" signs and symptoms to monitor closely for, including weakness, numbness, bowel/bladder incontinence, inability to walk  · MRI reviewed at length with patient and wife  · Recommend continued conservative management at this time, similar to what is detailed above  · Referral placed to PT  · Referral placed to outpatient pain management  · Patient encouraged to continue on his multimodal pain regimen and follow-up with pain management for further recommendations and prescriptions  · Patient encouraged to follow-up in the neurosurgery clinic on an as needed basis should he fail conservative management   · All questions answered at this time  Patient and wife in agreement with this plan    "

## 2023-06-01 ENCOUNTER — CONSULT (OUTPATIENT)
Dept: PAIN MEDICINE | Facility: CLINIC | Age: 58
End: 2023-06-01

## 2023-06-01 VITALS
HEART RATE: 91 BPM | SYSTOLIC BLOOD PRESSURE: 116 MMHG | RESPIRATION RATE: 16 BRPM | BODY MASS INDEX: 23.95 KG/M2 | HEIGHT: 74 IN | DIASTOLIC BLOOD PRESSURE: 71 MMHG | WEIGHT: 186.6 LBS

## 2023-06-01 DIAGNOSIS — M05.79 RHEUMATOID ARTHRITIS INVOLVING MULTIPLE SITES WITH POSITIVE RHEUMATOID FACTOR (HCC): ICD-10-CM

## 2023-06-01 DIAGNOSIS — M54.16 LUMBAR RADICULOPATHY: ICD-10-CM

## 2023-06-01 DIAGNOSIS — M54.12 CERVICAL RADICULITIS: Primary | ICD-10-CM

## 2023-06-01 NOTE — PROGRESS NOTES
"Assessment:  1  Cervical radiculitis    2  Lumbar radiculopathy    3  Rheumatoid arthritis involving multiple sites with positive rheumatoid factor ([facility] [address] )        Portions of the record may have been created with voice recognition software  Occasional wrong word or \"sound a like\" substitutions may have occurred due to the inherent limitations of voice recognition software  Read the chart carefully and recognize, using context, where substitutions have occurred  Contact me with any questions  Plan:  82-year-old male with history of rheumatoid arthritis (on Cimzia) referred by Jennifer Reyes PA-C, here for initial evaluation of chronic neck pain of 6 months duration without inciting event  He also notes radiation into RUE in C5 distribution and into the left shoulder at times  Denies paresthesias, weakness, balance/gait issues  He also notes 15-year history of low back pain with radiation into posterior RLE without weakness, bowel/bladder numbness/saddle anesthesia  He was recently admitted to the hospital for fevers, chills and low back pain  Cervical and lumbar spine MRI was performed to rule out discitis/osteomyelitis  Cervical and lumbar spine MRIs showed multilevel degenerative changes with varying levels of canal and foraminal narrowing  No discitis/osteomyelitis/abscess was noted in cervical spine  Mixed type I/II Modic endplate changes at I5-4 and L5-S1 were noted in the lumbar spine with discitis/osteomyelitis favored less likely  Infectious work-up was negative except ESR was found to be elevated at admission which was presumed to be related to pain  No acute intervention was recommended by neurosurgery  Rheumatology was consulted and he was started on a long steroid taper which he is still continuing  Fevers were presumed to be related to RA and rheumatology follow-up was recommended  Today, patient notes significant improvement in symptoms with steroid taper    No subjective or " objective neurological deficits  Symptoms likely in the setting of RA, cervical and lumbar radiculitis  1   Recommend course of physical therapy  Patient agreeable  2   Encouraged to keep follow-up with rheumatology  3   Continue steroid taper, Cymbalta 60 daily, Robaxin 500 mg as needed  Patient notes symptoms are relatively well managed on this regimen currently  Consider addition of gabapentin in the future if appropriate  4   Follow-up in 2 to 3 months or as needed  History of Present Illness: The patient is a 56 y o  male who presents for consultation in regards to Neck Pain, Back Pain, Leg Pain (right), and Shoulder Pain (bilateral)  Symptoms have been present for 6 months (neck pain), 15 years (low back pain)  Symptoms began without any precipitating injury or trauma  Pain is reported to be 6 on the numeric rating scale  Symptoms are felt nearly constantly and worst in the no typical pattern  Symptoms are characterized as burning and sharp  Denies new or progressive weakness, saddle anesthesia, bowel/bladder incontinence, balance/gait issues  Aggravating factors include bending and walking  No change in symptoms with lying down, standing and sitting  Medications to relieve symptoms include toradol 10 mg prn, robaxin 500 mg prn, prednisone taper, cymbalta 60 mg daily per other provider  Notes he has undergone chiropractic treatment in the past but has not been to physical therapy  Review of Systems:    Review of Systems   Constitutional: Positive for chills  Musculoskeletal: Positive for back pain  Joint pain   All other systems reviewed and are negative  Past Medical History:   Diagnosis Date   • Pancreatitis    • RA (rheumatoid arthritis) ([facility] [address] )    • Rheumatoid arthritis        Past Surgical History:   Procedure Laterality Date   • CHOLECYSTECTOMY     • FINGER SURGERY Right     fourth digit       History reviewed  No pertinent family history      Social History "    Occupational History   • Not on file   Tobacco Use   • Smoking status: Never   • Smokeless tobacco: Never   Vaping Use   • Vaping Use: Never used   Substance and Sexual Activity   • Alcohol use: Never   • Drug use: Never   • Sexual activity: Yes     Partners: Female         Current Outpatient Medications:   •  Amylase-Lipase-Protease (PANCRELIPASE 58874 PO), Take by mouth, Disp: , Rfl:   •  Certolizumab Pegol (CIMZIA PREFILLED SC), Inject under the skin, Disp: , Rfl:   •  DULoxetine (CYMBALTA) 60 mg delayed release capsule, Take 60 mg by mouth daily, Disp: , Rfl:   •  ketorolac (TORADOL) 10 mg tablet, Take 1 tablet (10 mg total) by mouth every 6 (six) hours as needed for moderate pain, Disp: 60 tablet, Rfl: 0  •  leflunomide (ARAVA) 10 MG tablet, Take 10 mg by mouth 2 (two) times a day, Disp: , Rfl:   •  methocarbamol (ROBAXIN) 500 mg tablet, Take 1 tablet (500 mg total) by mouth 4 (four) times a day, Disp: 120 tablet, Rfl: 0  •  pantoprazole (PROTONIX) 40 mg tablet, Take 40 mg by mouth daily, Disp: , Rfl:   •  predniSONE 5 mg tablet, Take 4 tablets (20 mg total) by mouth daily for 7 days, THEN 3 tablets (15 mg total) daily for 7 days, THEN 2 tablets (10 mg total) daily for 7 days, THEN 1 tablet (5 mg total) daily for 7 days  , Disp: 70 tablet, Rfl: 0    No Known Allergies    Physical Exam:    /71 (BP Location: Left arm, Patient Position: Sitting, Cuff Size: Standard)   Pulse 91   Resp 16   Ht 6' 2\" (1 88 m)   Wt 84 6 kg (186 lb 9 6 oz)   BMI 23 96 kg/m²     Constitutional: normal, well developed, well nourished, alert, in no distress and non-toxic and no overt pain behavior    Eyes: anicteric  HEENT: grossly intact  Neck: supple, symmetric, trachea midline and no masses   Pulmonary:even and unlabored  Cardiovascular:No edema or pitting edema present  Skin:Normal without rashes or lesions and well hydrated  Psychiatric:Mood and affect appropriate  Neurologic:Cranial Nerves II-XII grossly " intact  Musculoskeletal:normal gait, pain to palpation over b/l cervical paraspinals, no significant pain to palpation over lumbar paraspinals, intact neck ROM, negative Spurling's, grossly intact strength and sensation to light touch over BUE and BLE, negative Taylor's    Imaging    Narrative & Impression   MRI CERVICAL SPINE WITH AND WITHOUT CONTRAST     INDICATION: discitis      COMPARISON: CT soft tissue neck with contrast 5/16/2023      TECHNIQUE:  Multiplanar, multisequence imaging of the cervical spine was performed before and after gadolinium administration           IV Contrast:  8 mL of Gadobutrol injection (SINGLE-DOSE)     IMAGE QUALITY:  Diagnostic      FINDINGS:     ALIGNMENT:  Normal alignment of the cervical spine  No compression fracture  No subluxation  No scoliosis      MARROW SIGNAL: Type II Modic endplate change F5-Z8 and C6-C7  Otherwise, normal bone marrow signal      CERVICAL AND VISUALIZED UPPER THORACIC CORD:  Normal signal within the visualized cord      PREVERTEBRAL AND PARASPINAL SOFT TISSUES:  Normal      VISUALIZED POSTERIOR FOSSA:  The visualized posterior fossa demonstrates no abnormal signal      CERVICAL DISC SPACES: Multilevel osteophytes, disc height loss, and uncovertebral hypertrophy      C1-C2: Normal      C2-C3: Tiny central disc protrusion  No significant canal stenosis or foraminal narrowing      C3-C4: Tiny central disc protrusion  No significant canal stenosis or foraminal narrowing      C4-C5: Diffuse disc bulge, small central disc protrusion with extension into right subarticular zone  No significant canal stenosis or foraminal narrowing      C5-C6: Diffuse disc bulge, small left subarticular disc protrusion  Uncovertebral hypertrophy  Mild canal stenosis  Mild right and moderate left foraminal narrowing      C6-C7: Diffuse disc bulge  Uncovertebral hypertrophy  Mild canal stenosis   Mild right and severe left foraminal narrowing      C7-T1: Normal      UPPER THORACIC DISC SPACES:  Normal      POSTCONTRAST IMAGING:  Normal  No organized rim-enhancing fluid collection to suggest abscess      OTHER FINDINGS: Partially imaged 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, unchanged      IMPRESSION:     No discitis/osteomyelitis or abscess in cervical spine      Multilevel degenerative changes of cervical spine with varying degrees of canal stenosis (mild C5-C6 and C6-C7) and foraminal narrowing (severe left C6-C7), as detailed above      Partially imaged 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, unchanged  Study Result    Narrative & Impression   MRI LUMBAR SPINE WITH AND WITHOUT CONTRAST     INDICATION: discitis      COMPARISON: Portable chest radiograph 5/16/2023      TECHNIQUE:  Multiplanar, multisequence imaging of the lumbar spine was performed before and after gadolinium administration           IV Contrast:  8 mL of Gadobutrol injection (SINGLE-DOSE)     IMAGE QUALITY:  Diagnostic     FINDINGS:     VERTEBRAL BODIES:  There are 5 lumbar type vertebral bodies  Normal alignment of the lumbar spine  No spondylolysis or spondylolisthesis  No scoliosis  No compression fracture      Small L2 intraosseous vertebral body hemangioma  Mixed type I/II Modic endplate change and G6-O8 and L5-S1  Otherwise, normal bone marrow signal      SACRUM:  Normal signal within the sacrum  No evidence of insufficiency or stress fracture      DISTAL CORD AND CONUS:  Normal size and signal within the distal cord and conus      PARASPINAL SOFT TISSUES:  Paraspinal soft tissues are unremarkable      LOWER THORACIC DISC SPACES: Mild noncompressive lower thoracic degenerative change      LUMBAR DISC SPACES: Multilevel osteophytes, disc height loss and facet arthropathy  Tiny subchondral cystic change at posterior superior endplate of L4, likely degenerative      L1-L2: Facet arthropathy   No significant canal stenosis or foraminal narrowing      L2-L3: Facet arthropathy  No significant canal stenosis or foraminal narrowing      L3-L4: Diffuse disc bulge, narrowed bilateral subarticular zones right foraminal/extraforaminal disc osteophyte complex contacting right L3 exiting nerve  Facet arthropathy, ligamentum flavum thickening  Mild canal stenosis  Moderate right foraminal   narrowing      L4-L5: Diffuse disc bulge, small central disc protrusion, narrowed bilateral subarticular zones, left foraminal/extraforaminal disc osteophyte complex contacting left L4 exiting nerve  Facet arthropathy  Mild canal stenosis  Moderate left foraminal   narrowing      L5-S1: Diffuse disc bulge, marginal endplate osteophytes, narrowed bilateral subarticular zones, and contact of bilateral L5 exiting nerves  Facet arthropathy and trace right facet joint effusion  Mild canal stenosis  Mild bilateral foraminal narrowing      POSTCONTRAST IMAGING:  No abnormal enhancement      OTHER FINDINGS:  None      IMPRESSION:     Mixed type I/II Modic endplate change at P4-M7 and L5-S1  Discitis/osteomyelitis favored less likely  No abscess in lumbar spine  Clinical follow-up advised      Multilevel degenerative changes of lumbar spine with varying degrees of canal stenosis (multilevel mild, worse at L3-L4 and L4-L5) and foraminal narrowing (moderate right L3-L4 and left L4-L5), as detailed above             Orders Placed This Encounter   Procedures   • Ambulatory referral to Physical Therapy

## 2023-07-21 ENCOUNTER — APPOINTMENT (EMERGENCY)
Dept: RADIOLOGY | Facility: HOSPITAL | Age: 58
End: 2023-07-21
Payer: COMMERCIAL

## 2023-07-21 ENCOUNTER — HOSPITAL ENCOUNTER (EMERGENCY)
Facility: HOSPITAL | Age: 58
Discharge: HOME | End: 2023-07-21
Attending: EMERGENCY MEDICINE
Payer: COMMERCIAL

## 2023-07-21 ENCOUNTER — OFFICE VISIT (OUTPATIENT)
Dept: URGENT CARE | Facility: CLINIC | Age: 58
End: 2023-07-21
Payer: COMMERCIAL

## 2023-07-21 ENCOUNTER — APPOINTMENT (EMERGENCY)
Dept: NON INVASIVE DIAGNOSTICS | Facility: HOSPITAL | Age: 58
End: 2023-07-21
Payer: COMMERCIAL

## 2023-07-21 VITALS
TEMPERATURE: 97.8 F | RESPIRATION RATE: 17 BRPM | DIASTOLIC BLOOD PRESSURE: 70 MMHG | SYSTOLIC BLOOD PRESSURE: 120 MMHG | OXYGEN SATURATION: 100 % | HEART RATE: 79 BPM

## 2023-07-21 VITALS
BODY MASS INDEX: 23.87 KG/M2 | HEART RATE: 82 BPM | RESPIRATION RATE: 18 BRPM | WEIGHT: 186 LBS | DIASTOLIC BLOOD PRESSURE: 79 MMHG | TEMPERATURE: 96.8 F | HEIGHT: 74 IN | OXYGEN SATURATION: 96 % | SYSTOLIC BLOOD PRESSURE: 128 MMHG

## 2023-07-21 DIAGNOSIS — M25.461 PREPATELLAR EFFUSION OF RIGHT KNEE: Primary | ICD-10-CM

## 2023-07-21 DIAGNOSIS — M79.661 RIGHT CALF PAIN: Primary | ICD-10-CM

## 2023-07-21 DIAGNOSIS — R60.0 EDEMA OF RIGHT LOWER EXTREMITY: ICD-10-CM

## 2023-07-21 LAB
ALBUMIN SERPL BCP-MCNC: 3.7 G/DL (ref 3.5–5)
ALP SERPL-CCNC: 71 U/L (ref 34–104)
ALT SERPL W P-5'-P-CCNC: 15 U/L (ref 7–52)
ANION GAP SERPL CALCULATED.3IONS-SCNC: 8 MMOL/L
APTT PPP: 35 SECONDS (ref 23–37)
AST SERPL W P-5'-P-CCNC: 16 U/L (ref 13–39)
BASOPHILS # BLD AUTO: 0.02 THOUSANDS/ÂΜL (ref 0–0.1)
BASOPHILS NFR BLD AUTO: 0 % (ref 0–1)
BILIRUB SERPL-MCNC: 1.52 MG/DL (ref 0.2–1)
BUN SERPL-MCNC: 16 MG/DL (ref 5–25)
CALCIUM SERPL-MCNC: 9.5 MG/DL (ref 8.4–10.2)
CHLORIDE SERPL-SCNC: 107 MMOL/L (ref 96–108)
CO2 SERPL-SCNC: 23 MMOL/L (ref 21–32)
CREAT SERPL-MCNC: 1.07 MG/DL (ref 0.6–1.3)
CRP SERPL QL: 4.3 MG/L
CRYSTALS SNV QL MICRO: NORMAL
EOSINOPHIL # BLD AUTO: 0.15 THOUSAND/ÂΜL (ref 0–0.61)
EOSINOPHIL NFR BLD AUTO: 3 % (ref 0–6)
ERYTHROCYTE [DISTWIDTH] IN BLOOD BY AUTOMATED COUNT: 12.4 % (ref 11.6–15.1)
ERYTHROCYTE [SEDIMENTATION RATE] IN BLOOD: 23 MM/HOUR (ref 0–19)
GFR SERPL CREATININE-BSD FRML MDRD: 77 ML/MIN/1.73SQ M
GLUCOSE SERPL-MCNC: 105 MG/DL (ref 65–140)
HCT VFR BLD AUTO: 42.9 % (ref 36.5–49.3)
HGB BLD-MCNC: 14.4 G/DL (ref 12–17)
HISTIOCYTES NFR SNV MANUAL: 2 %
IMM GRANULOCYTES # BLD AUTO: 0.01 THOUSAND/UL (ref 0–0.2)
IMM GRANULOCYTES NFR BLD AUTO: 0 % (ref 0–2)
INR PPP: 1.17 (ref 0.84–1.19)
LACTATE SERPL-SCNC: 0.8 MMOL/L (ref 0.5–2)
LYMPHOCYTES # BLD AUTO: 1.15 THOUSANDS/ÂΜL (ref 0.6–4.47)
LYMPHOCYTES # SNV MANUAL: 3 %
LYMPHOCYTES NFR BLD AUTO: 23 % (ref 14–44)
MCH RBC QN AUTO: 29.8 PG (ref 26.8–34.3)
MCHC RBC AUTO-ENTMCNC: 33.6 G/DL (ref 31.4–37.4)
MCV RBC AUTO: 89 FL (ref 82–98)
MONOCYTES # BLD AUTO: 0.51 THOUSAND/ÂΜL (ref 0.17–1.22)
MONOCYTES NFR BLD AUTO: 10 % (ref 4–12)
MONOCYTES NFR SNV MANUAL: 2 %
NEUTROPHILS # BLD AUTO: 3.14 THOUSANDS/ÂΜL (ref 1.85–7.62)
NEUTROPHILS NFR SNV MANUAL: 93 %
NEUTS SEG NFR BLD AUTO: 64 % (ref 43–75)
NRBC BLD AUTO-RTO: 0 /100 WBCS
PLATELET # BLD AUTO: 189 THOUSANDS/UL (ref 149–390)
PMV BLD AUTO: 9.4 FL (ref 8.9–12.7)
POTASSIUM SERPL-SCNC: 4.1 MMOL/L (ref 3.5–5.3)
PROT SERPL-MCNC: 6.4 G/DL (ref 6.4–8.4)
PROTHROMBIN TIME: 15.2 SECONDS (ref 11.6–14.5)
RBC # BLD AUTO: 4.83 MILLION/UL (ref 3.88–5.62)
SODIUM SERPL-SCNC: 138 MMOL/L (ref 135–147)
TOTAL CELLS COUNTED SPEC: 100
WBC # BLD AUTO: 4.98 THOUSAND/UL (ref 4.31–10.16)
WBC # FLD MANUAL: ABNORMAL /UL (ref 0–200)

## 2023-07-21 PROCEDURE — 83605 ASSAY OF LACTIC ACID: CPT | Performed by: PHYSICIAN ASSISTANT

## 2023-07-21 PROCEDURE — 96374 THER/PROPH/DIAG INJ IV PUSH: CPT

## 2023-07-21 PROCEDURE — 80053 COMPREHEN METABOLIC PANEL: CPT | Performed by: PHYSICIAN ASSISTANT

## 2023-07-21 PROCEDURE — 86140 C-REACTIVE PROTEIN: CPT | Performed by: PHYSICIAN ASSISTANT

## 2023-07-21 PROCEDURE — 85730 THROMBOPLASTIN TIME PARTIAL: CPT | Performed by: PHYSICIAN ASSISTANT

## 2023-07-21 PROCEDURE — 36415 COLL VENOUS BLD VENIPUNCTURE: CPT | Performed by: PHYSICIAN ASSISTANT

## 2023-07-21 PROCEDURE — 20610 DRAIN/INJ JOINT/BURSA W/O US: CPT | Performed by: PHYSICIAN ASSISTANT

## 2023-07-21 PROCEDURE — 85025 COMPLETE CBC W/AUTO DIFF WBC: CPT | Performed by: PHYSICIAN ASSISTANT

## 2023-07-21 PROCEDURE — 99285 EMERGENCY DEPT VISIT HI MDM: CPT | Performed by: PHYSICIAN ASSISTANT

## 2023-07-21 PROCEDURE — 87205 SMEAR GRAM STAIN: CPT | Performed by: PHYSICIAN ASSISTANT

## 2023-07-21 PROCEDURE — 89060 EXAM SYNOVIAL FLUID CRYSTALS: CPT | Performed by: PHYSICIAN ASSISTANT

## 2023-07-21 PROCEDURE — 93971 EXTREMITY STUDY: CPT | Performed by: SURGERY

## 2023-07-21 PROCEDURE — 99284 EMERGENCY DEPT VISIT MOD MDM: CPT

## 2023-07-21 PROCEDURE — 73564 X-RAY EXAM KNEE 4 OR MORE: CPT

## 2023-07-21 PROCEDURE — 93971 EXTREMITY STUDY: CPT

## 2023-07-21 PROCEDURE — 89051 BODY FLUID CELL COUNT: CPT | Performed by: PHYSICIAN ASSISTANT

## 2023-07-21 PROCEDURE — 87070 CULTURE OTHR SPECIMN AEROBIC: CPT | Performed by: PHYSICIAN ASSISTANT

## 2023-07-21 PROCEDURE — 85652 RBC SED RATE AUTOMATED: CPT | Performed by: PHYSICIAN ASSISTANT

## 2023-07-21 PROCEDURE — 85610 PROTHROMBIN TIME: CPT | Performed by: PHYSICIAN ASSISTANT

## 2023-07-21 PROCEDURE — 99213 OFFICE O/P EST LOW 20 MIN: CPT

## 2023-07-21 PROCEDURE — 87040 BLOOD CULTURE FOR BACTERIA: CPT | Performed by: PHYSICIAN ASSISTANT

## 2023-07-21 RX ORDER — MORPHINE SULFATE 4 MG/ML
4 INJECTION, SOLUTION INTRAMUSCULAR; INTRAVENOUS ONCE
Status: COMPLETED | OUTPATIENT
Start: 2023-07-21 | End: 2023-07-21

## 2023-07-21 RX ORDER — LIDOCAINE HYDROCHLORIDE 10 MG/ML
5 INJECTION, SOLUTION EPIDURAL; INFILTRATION; INTRACAUDAL; PERINEURAL ONCE
Status: COMPLETED | OUTPATIENT
Start: 2023-07-21 | End: 2023-07-21

## 2023-07-21 RX ORDER — HYDROCODONE BITARTRATE AND ACETAMINOPHEN 5; 325 MG/1; MG/1
1 TABLET ORAL EVERY 6 HOURS PRN
Qty: 10 TABLET | Refills: 0 | Status: SHIPPED | OUTPATIENT
Start: 2023-07-21 | End: 2023-07-31

## 2023-07-21 RX ORDER — AMOXICILLIN AND CLAVULANATE POTASSIUM 875; 125 MG/1; MG/1
1 TABLET, FILM COATED ORAL EVERY 12 HOURS SCHEDULED
Qty: 14 TABLET | Refills: 0 | Status: SHIPPED | OUTPATIENT
Start: 2023-07-21 | End: 2023-07-28

## 2023-07-21 RX ADMIN — MORPHINE SULFATE 4 MG: 4 INJECTION, SOLUTION INTRAMUSCULAR; INTRAVENOUS at 13:47

## 2023-07-21 RX ADMIN — LIDOCAINE HYDROCHLORIDE 5 ML: 10 INJECTION, SOLUTION EPIDURAL; INFILTRATION; INTRACAUDAL; PERINEURAL at 14:48

## 2023-07-21 NOTE — ED PROVIDER NOTES
History  Chief Complaint   Patient presents with   • Leg Pain     Patient reports pain, redness, and swelling in right calf since Wednesday. Taking ibuprofen without relief. This is a pleasant 82-year-old male who presents to the emergency department today via private vehicle he provides his own history he ambulated through the department on his own power accompanied by family. Sent here by urgent care I did have the privilege of reading the urgent care providers note. Patient tells me at bedside that began with a minimal amount of right-sided knee discomfort last week atraumatic. Denies any blunt or penetrating trauma. He states he did not think much of this, 4 days ago he began with increasing right knee pain followed by right knee swelling. He does have pain with range of motion of the right knee is specifically with flexion. He also has swelling of the right leg with some warmth. He has had chills and rigors at home as well denies diaphoresis. Denies chest pain shortness of breath or dyspnea on exertion. He is not hypoxic or tachycardic here. Prior to Admission Medications   Prescriptions Last Dose Informant Patient Reported? Taking?    Amylase-Lipase-Protease (PANCRELIPASE 59746 PO)   Yes No   Sig: Take by mouth   Certolizumab Pegol (CIMZIA PREFILLED SC)   Yes No   Sig: Inject under the skin   DULoxetine (CYMBALTA) 60 mg delayed release capsule   Yes No   Sig: Take 60 mg by mouth daily   ketorolac (TORADOL) 10 mg tablet   No No   Sig: Take 1 tablet (10 mg total) by mouth every 6 (six) hours as needed for moderate pain   leflunomide (ARAVA) 10 MG tablet   Yes No   Sig: Take 10 mg by mouth 2 (two) times a day   methocarbamol (ROBAXIN) 500 mg tablet   No No   Sig: Take 1 tablet (500 mg total) by mouth 4 (four) times a day   pantoprazole (PROTONIX) 40 mg tablet   Yes No   Sig: Take 40 mg by mouth daily      Facility-Administered Medications: None       Past Medical History:   Diagnosis Date   • Pancreatitis    • RA (rheumatoid arthritis) ([address])    • Rheumatoid arthritis        Past Surgical History:   Procedure Laterality Date   • CHOLECYSTECTOMY     • FINGER SURGERY Right     fourth digit       History reviewed. No pertinent family history. I have reviewed and agree with the history as documented. E-Cigarette/Vaping   • E-Cigarette Use Never User      E-Cigarette/Vaping Substances     Social History     Tobacco Use   • Smoking status: Never   • Smokeless tobacco: Never   Vaping Use   • Vaping Use: Never used   Substance Use Topics   • Alcohol use: Never   • Drug use: Never       Review of Systems   Constitutional: Negative. Negative for activity change, appetite change, chills, diaphoresis, fatigue, fever and unexpected weight change. HENT: Negative. Negative for sore throat, trouble swallowing and voice change. Eyes: Negative. Respiratory: Negative. Negative for cough, chest tightness, shortness of breath and wheezing. Cardiovascular: Negative. Negative for chest pain, palpitations and leg swelling. Gastrointestinal: Negative. Negative for abdominal pain, blood in stool, nausea and vomiting. Endocrine: Negative. Genitourinary: Negative. Negative for flank pain and hematuria. Musculoskeletal: Negative. Negative for arthralgias, back pain, gait problem, joint swelling, myalgias, neck pain and neck stiffness. Right knee pain swelling right calf pain warmth   Skin: Negative. Negative for rash and wound. Allergic/Immunologic: Negative. Neurological: Negative. Negative for dizziness, seizures, syncope, weakness, light-headedness and headaches. Hematological: Negative. Psychiatric/Behavioral: Negative. All other systems reviewed and are negative. Physical Exam  Physical Exam  Vitals reviewed. Constitutional:       General: He is not in acute distress. Appearance: Normal appearance. He is not ill-appearing, toxic-appearing or diaphoretic.    HENT: Head: Normocephalic and atraumatic. Right Ear: External ear normal.      Left Ear: External ear normal.   Eyes:      General: No scleral icterus. Right eye: No discharge. Left eye: No discharge. Extraocular Movements: Extraocular movements intact. Conjunctiva/sclera: Conjunctivae normal.   Cardiovascular:      Rate and Rhythm: Normal rate. Pulses: Normal pulses. Pulmonary:      Effort: Pulmonary effort is normal. No respiratory distress. Breath sounds: No stridor. Musculoskeletal:         General: Swelling and tenderness present. No deformity or signs of injury. Cervical back: Normal range of motion. No rigidity. Right lower leg: Edema present. Left lower leg: No edema. Comments: Pain with flexion of the right knee tenderness anteriorly as well as posteriorly. Tenderness of the right calf. Normal dorsalis pedis pulse and sensation distally. There is swelling of the lower right extremity +1 edema as well. Left leg normal.   Skin:     General: Skin is warm. Coloration: Skin is not jaundiced. Findings: No lesion or rash. Neurological:      General: No focal deficit present. Mental Status: He is alert and oriented to person, place, and time. Mental status is at baseline. Gait: Gait normal.   Psychiatric:         Mood and Affect: Mood normal.         Thought Content:  Thought content normal.         Judgment: Judgment normal.         Vital Signs  ED Triage Vitals [07/21/23 1226]   Temperature Pulse Respirations Blood Pressure SpO2   (!) 96.8 °F (36 °C) 82 18 128/79 96 %      Temp Source Heart Rate Source Patient Position - Orthostatic VS BP Location FiO2 (%)   Tympanic Monitor Sitting Right arm --      Pain Score       8           Vitals:    07/21/23 1226   BP: 128/79   Pulse: 82   Patient Position - Orthostatic VS: Sitting         Visual Acuity      ED Medications  Medications   morphine injection 4 mg (4 mg Intravenous Given 7/21/23 1347)   lidocaine (PF) (XYLOCAINE-MPF) 1 % injection 5 mL (5 mL Infiltration Given 7/21/23 1448)       Diagnostic Studies  Results Reviewed     Procedure Component Value Units Date/Time    Synovial fluid white cell count w/ diff [[id number]]  (Abnormal) Collected: 07/21/23 1441    Lab Status: Final result Specimen: Synovial Fluid from Joint, Right Knee Updated: 07/21/23 1602     WBC, Fluid 12,933 /ul     Synovial Fluid Diff [[id number]] Collected: 07/21/23 1441    Lab Status: In process Specimen: Synovial Fluid from Joint, Right Knee Updated: 07/21/23 1602    Body fluid culture and Gram stain [[id number]] Collected: 07/21/23 1441    Lab Status: In process Specimen: Body Fluid from Joint, Right Knee Updated: 07/21/23 1456    Synovial fluid, crystal [[id number]] Collected: 07/21/23 1441    Lab Status: In process Specimen: Synovial Fluid from Joint, Right Knee Updated: 07/21/23 1456    Blood culture #2 [[id number]] Collected: 07/21/23 1406    Lab Status:  In process Specimen: Blood from Arm, Left Updated: 07/21/23 1411    Comprehensive metabolic panel [[id number]]  (Abnormal) Collected: 07/21/23 1257    Lab Status: Final result Specimen: Blood from Arm, Right Updated: 07/21/23 1324     Sodium 138 mmol/L      Potassium 4.1 mmol/L      Chloride 107 mmol/L      CO2 23 mmol/L      ANION GAP 8 mmol/L      BUN 16 mg/dL      Creatinine 1.07 mg/dL      Glucose 105 mg/dL      Calcium 9.5 mg/dL      AST 16 U/L      ALT 15 U/L      Alkaline Phosphatase 71 U/L      Total Protein 6.4 g/dL      Albumin 3.7 g/dL      Total Bilirubin 1.52 mg/dL      eGFR 77 ml/min/1.73sq m     Narrative:      [place] guidelines for Chronic Kidney Disease (CKD):   •  Stage 1 with normal or high GFR (GFR > 90 mL/min/1.73 square meters)  •  Stage 2 Mild CKD (GFR = 60-89 mL/min/1.73 square meters)  •  Stage 3A Moderate CKD (GFR = 45-59 mL/min/1.73 square meters)  •  Stage 3B Moderate CKD (GFR = 30-44 mL/min/1.73 square meters)  •  Stage 4 Severe CKD (GFR = 15-29 mL/min/1.73 square meters)  •  Stage 5 End Stage CKD (GFR <15 mL/min/1.73 square meters)  Note: GFR calculation is accurate only with a steady state creatinine    C-reactive protein [[id number]]  (Abnormal) Collected: 07/21/23 1257    Lab Status: Final result Specimen: Blood from Arm, Right Updated: 07/21/23 1324     CRP 4.3 mg/L     Lactic acid, plasma (w/reflex if result > 2.0) [[id number]]  (Normal) Collected: 07/21/23 1257    Lab Status: Final result Specimen: Blood from Arm, Right Updated: 07/21/23 1322     LACTIC ACID 0.8 mmol/L     Narrative:      Result may be elevated if tourniquet was used during collection. Protime-INR [[id number]]  (Abnormal) Collected: 07/21/23 1257    Lab Status: Final result Specimen: Blood from Arm, Right Updated: 07/21/23 1321     Protime 15.2 seconds      INR 1.17    APTT [[id number]]  (Normal) Collected: 07/21/23 1257    Lab Status: Final result Specimen: Blood from Arm, Right Updated: 07/21/23 1321     PTT 35 seconds     Sedimentation rate, automated [[id number]]  (Abnormal) Collected: 07/21/23 1257    Lab Status: Final result Specimen: Blood from Arm, Right Updated: 07/21/23 1317     Sed Rate 23 mm/hour     Blood culture #1 [[id number]] Collected: 07/21/23 1257    Lab Status:  In process Specimen: Blood from Arm, Right Updated: 07/21/23 1312    CBC and differential [[id number]] Collected: 07/21/23 1257    Lab Status: Final result Specimen: Blood from Arm, Right Updated: 07/21/23 1312     WBC 4.98 Thousand/uL      RBC 4.83 Million/uL      Hemoglobin 14.4 g/dL      Hematocrit 42.9 %      MCV 89 fL      MCH 29.8 pg      MCHC 33.6 g/dL      RDW 12.4 %      MPV 9.4 fL      Platelets 115 Thousands/uL      nRBC 0 /100 WBCs      Neutrophils Relative 64 %      Immat GRANS % 0 %      Lymphocytes Relative 23 %      Monocytes Relative 10 %      Eosinophils Relative 3 %      Basophils Relative 0 %      Neutrophils Absolute 3.14 Thousands/µL Immature Grans Absolute 0.01 Thousand/uL      Lymphocytes Absolute 1.15 Thousands/µL      Monocytes Absolute 0.51 Thousand/µL      Eosinophils Absolute 0.15 Thousand/µL      Basophils Absolute 0.02 Thousands/µL                  VAS lower limb venous duplex study, unilateral/limited   Final Result by Anton Mullins MD (07/21 1516)      XR knee 4+ vw right injury   ED Interpretation by Jayesh An PA-C (07/21 1353)   Prepatellar effusion is noted no evidence of acute osseous defect however                 Procedures  Arthrocentesis    Date/Time: 7/21/2023 2:44 PM    Performed by: Jayesh An PA-C  Authorized by: Jayesh An PA-C  Universal Protocol:  Consent: Verbal consent obtained. Consent given by: patient  Radiology Images displayed and confirmed. If images not available, report reviewed: imaging studies available  Patient identity confirmed: verbally with patient      Location:  Bedside  Indications:  Joint swelling, possible septic joint and pain  Body area:  Knee  Joint:  Right knee  Local anesthesia used?: Yes    Local anesthetic:  Lidocaine 1% without epinephrine  Anesthetic total (ml):  3  Preparation: Patient was prepped and draped in usual sterile fashion    Needle size:  18 G  Approach:  Lateral  Aspirate:  Blood-tinged and yellow  Patient tolerance:  Patient tolerated the procedure well with no immediate complications   Band-Aid followed by Ace wrap placed post procedure. ED Course  ED Course as of 07/21/23 1636   Fri Jul 21, 2023   1244 SpO2: 96 %   1244 Respirations: 18   1244 Pulse: 82   1244 Temperature(!): 96.8 °F (36 °C)   1244 Blood Pressure: 128/79  Vs reviewed wnl   1244 No hypoxia or tachycardia   1342 Her ultrasound technologist there is no evidence of DVT. There is a small amount of fluid in the popliteal fossa. 1342 CBC reviewed no leukocytosis or anemia. 1342 Patient does have elevation in his CRP as well as sed rate.    1456 C-reactive protein elevated sed rate elevated negative DVT arthrocentesis completed awaiting results and will speak with orthopedics. 56 The following is copied and pasted from orthopedics assessment and plan from April of this year:    Assessment:  56 y.o. male with right knee chronic arthritis (rheumatoid vs osteoarthritis)      Plan:   · Weight bearing as tolerated  right lower extremity  º Ice or compression with ACE as needed for swelling  · PT/OT inpatient as needed; outpatient therapy   · Pain control per primary  · Body mass index is 23.07 kg/m². .  · Dispo: Ortho signing off  º No acute orthopedic intervention to the right knee indicated at this time  · Discussion had with patient regarding outpatient management of chronic changes to the knee  º Discussed pain and swelling as related to both history of RA and age-related degenerative changes, and as commonly cyclic as his has been. Discussed results from prior fluid analysis from last episode of swelling  º Can follow with Dr. Rudy Berman outpatient for orthopedic management      9966 TT sent to on call ortho   [id number] I did have the privilege of speaking with on-call orthopedics Dr. Eloy Levi, he believes this sounds like it is prepatellar and not intra-articular, can be sent home with oral antibiotics and seen as follow-up as an outpatient. SBIRT 20yo+    Flowsheet Row Most Recent Value   Initial Alcohol Screen: US AUDIT-C     1. How often do you have a drink containing alcohol? 0 Filed at: 07/21/2023 1226   2. How many drinks containing alcohol do you have on a typical day you are drinking? 0 Filed at: 07/21/2023 1226   3a. Male UNDER 65: How often do you have five or more drinks on one occasion? 0 Filed at: 07/21/2023 1226   3b. FEMALE Any Age, or MALE 65+: How often do you have 4 or more drinks on one occassion? 0 Filed at: 07/21/2023 1226   Audit-C Score 0 Filed at: 07/21/2023 1226   GLADYS: How many times in the past year have you. ..     Used an illegal drug or used a prescription medication for non-medical reasons? Never Filed at: 07/21/2023 1226                    Medical Decision Making  56-year-old male here for evaluation of right knee pain swelling right calf pain swelling right lower extremity warmth and redness I did take time to review the urgent care note from earlier today. He certainly has high concern for both DVT as well as septic arthritis of the right knee. He will have a venous duplex ultrasound to evaluate the venous flow, he will have x-ray of the right knee to evaluate for presence of effusion or traumatic abnormality. He will have laboratory evaluation to evaluate for inflammatory as well as infectious markers. Must rule out DVT as well as septic arthritis. DVT was ruled out with venous duplex ultrasound, x-ray noted effusion elevations in C-reactive protein and sedimentation rate were noted however no serum WBC elevation, arthrocentesis completed see separate procedure note discussed case with on-call orthopedics believes this is prepatellar in nature he recommends antibiotics and outpatient follow-up I did place ambulatory referral antibiotics and pain control for home. Amount and/or Complexity of Data Reviewed  Labs: ordered. Radiology: ordered and independent interpretation performed. Risk  Prescription drug management. Disposition  Final diagnoses:   Prepatellar effusion of right knee     Time reflects when diagnosis was documented in both MDM as applicable and the Disposition within this note     Time User Action Codes Description Comment    7/21/2023  4:32 PM Ran Credit Add [[id number]] Prepatellar effusion of right knee       ED Disposition     ED Disposition   Discharge    Condition   Stable    Date/Time   Fri Jul 21, 2023  4:32 PM    Comment   Lyla Shen discharge to home/self care.                Follow-up Information     Follow up With Specialties Details Why Any Muniz DO Orthopedic Surgery, Orthopedics Schedule an appointment as soon as possible for a visit   [address] [place] [address] [phone]            Patient's Medications   Discharge Prescriptions    AMOXICILLIN-CLAVULANATE (AUGMENTIN) 875-125 MG PER TABLET    Take 1 tablet by mouth every 12 (twelve) hours for 7 days       Start Date: 7/21/2023 End Date: 7/28/2023       Order Dose: 1 tablet       Quantity: 14 tablet    Refills: 0    HYDROCODONE-ACETAMINOPHEN (NORCO) 5-325 MG PER TABLET    Take 1 tablet by mouth every 6 (six) hours as needed for pain for up to 10 days Max Daily Amount: 4 tablets       Start Date: 7/21/2023 End Date: 7/31/2023       Order Dose: 1 tablet       Quantity: 10 tablet    Refills: 0           PDMP Review     None          ED Provider  Electronically Signed by           Cary Syed PA-C  07/21/23 7624

## 2023-07-21 NOTE — Clinical Note
Louis Dasilva was seen and treated in our emergency department on 7/21/2023. No restrictions            Diagnosis: Right knee effusion    Benny Drake  . He may return on this date:      Patient is excused from his shift on 21 July. If you have any questions or concerns, please don't hesitate to call.       Annie Roberts PA-C    ______________________________           _______________          _______________  Hospital Representative                              Date                                Time

## 2023-07-21 NOTE — PROGRESS NOTES
[facility]        NAME: Rosa Castelan is a 56 y.o. male  : 1965    MRN: [id number]  DATE: 2023  TIME: 11:26 AM    Assessment and Plan   Right calf pain [M79.661]  1. Right calf pain  Transfer to other facility      2. Edema of right lower extremity  Transfer to other facility        Symptoms began on -right knee to right foot pain. There is moderate swelling and tenderness to the right lower leg especially in the calf region. Pitting edema of the right ankle/foot. No recent travel or smoking history. Sending to the emergency room for further evaluation and possible ultrasound. History of rheumatoid arthritis. Patient Instructions       Go to the emergency room as instructed. Chief Complaint     Chief Complaint   Patient presents with   • Leg Pain   • Knee Pain   • Foot Pain     Started 5 days ago  Right leg, knee, and foot pain  Denies any fall, trauma, or MVA  OTC ibuprofen, and ice applied  Pain 6/10 resting, 10/10 while walking  Burning pain         History of Present Illness       29-year-old male presents with right knee pain, right calf pain, right ankle/foot pain. Symptoms began on . He denies any trauma, fall, MVA. Taking over-the-counter ibuprofen and applying ice. Admits to swelling and redness of the skin. Denies any recent long travel or smoking. Denies any personal or family history of clotting disorder. PT states he has history of RA, has had a flareup/effusion in the right knee that was drained in the emergency room in the past.  Denies any fever, chills, chest pain, shortness of breath, abdominal pain, nausea, vomiting, diarrhea. Review of Systems   Review of Systems   Constitutional: Negative. HENT: Negative. Respiratory: Negative. Cardiovascular: Negative. Gastrointestinal: Negative. Musculoskeletal: Positive for arthralgias, joint swelling and myalgias. Skin: Positive for color change. Neurological: Negative. Current Medications       Current Outpatient Medications:   •  Amylase-Lipase-Protease (PANCRELIPASE 15292 PO), Take by mouth, Disp: , Rfl:   •  Certolizumab Pegol (CIMZIA PREFILLED SC), Inject under the skin, Disp: , Rfl:   •  DULoxetine (CYMBALTA) 60 mg delayed release capsule, Take 60 mg by mouth daily, Disp: , Rfl:   •  leflunomide (ARAVA) 10 MG tablet, Take 10 mg by mouth 2 (two) times a day, Disp: , Rfl:   •  methocarbamol (ROBAXIN) 500 mg tablet, Take 1 tablet (500 mg total) by mouth 4 (four) times a day, Disp: 120 tablet, Rfl: 0  •  pantoprazole (PROTONIX) 40 mg tablet, Take 40 mg by mouth daily, Disp: , Rfl:   •  ketorolac (TORADOL) 10 mg tablet, Take 1 tablet (10 mg total) by mouth every 6 (six) hours as needed for moderate pain, Disp: 60 tablet, Rfl: 0    Current Allergies     Allergies as of 07/21/2023   • (No Known Allergies)            The following portions of the patient's history were reviewed and updated as appropriate: allergies, current medications, past family history, past medical history, past social history, past surgical history and problem list.     Past Medical History:   Diagnosis Date   • Pancreatitis    • RA (rheumatoid arthritis) ([address])    • Rheumatoid arthritis        Past Surgical History:   Procedure Laterality Date   • CHOLECYSTECTOMY     • FINGER SURGERY Right     fourth digit       History reviewed. No pertinent family history. Medications have been verified. Objective   /70   Pulse 79   Temp 97.8 °F (36.6 °C)   Resp 17   SpO2 100%        Physical Exam     Physical Exam  Constitutional:       General: He is not in acute distress. Appearance: Normal appearance. He is not ill-appearing, toxic-appearing or diaphoretic. HENT:      Head: Normocephalic and atraumatic. Eyes:      Extraocular Movements: Extraocular movements intact. Pupils: Pupils are equal, round, and reactive to light.    Cardiovascular:      Rate and Rhythm: Normal rate and regular rhythm. Pulses: Normal pulses. Heart sounds: Normal heart sounds. Pulmonary:      Effort: Pulmonary effort is normal.      Breath sounds: Normal breath sounds. Musculoskeletal:      Right knee: Swelling present. No erythema or ecchymosis. Decreased range of motion. Tenderness present. Normal alignment, normal meniscus and normal patellar mobility. Normal pulse. Left knee: Normal.      Right lower leg: Swelling and tenderness (right posterior calf region.) present. Edema present. Left lower leg: Normal.      Right ankle: Swelling (1-2+ pitting edema extending into the right foot.) present. Tenderness (With erythema.) present. Anterior drawer test negative. Normal pulse. Right Achilles Tendon: Normal.      Left ankle: Normal.      Right foot: Normal capillary refill. Swelling (1-2+ pitting edema. No open wound.) and tenderness present. Normal pulse. Left foot: Normal.   Skin:     General: Skin is warm and dry. Capillary Refill: Capillary refill takes less than 2 seconds. Findings: Erythema (Mild erythema from the right mid lower extremity extending down to the foot. No open wound, swelling, drainage.) present. No bruising or rash. Neurological:      General: No focal deficit present. Mental Status: He is alert and oriented to person, place, and time. Mental status is at baseline.    Psychiatric:         Mood and Affect: Mood normal.         Behavior: Behavior normal.

## 2023-07-23 LAB
BACTERIA BLD CULT: NORMAL
BACTERIA SPEC BFLD CULT: NO GROWTH
GRAM STN SPEC: NORMAL
GRAM STN SPEC: NORMAL

## 2023-07-26 LAB — BACTERIA BLD CULT: NORMAL

## 2023-07-27 LAB — BACTERIA BLD CULT: NORMAL

## 2023-08-08 RX ORDER — LEFLUNOMIDE 20 MG/1
20 TABLET ORAL DAILY
COMMUNITY
Start: 2023-07-10

## 2023-08-08 RX ORDER — AMOXICILLIN AND CLAVULANATE POTASSIUM 875; 125 MG/1; MG/1
TABLET, FILM COATED ORAL
COMMUNITY

## 2023-08-08 RX ORDER — HYDROCODONE BITARTRATE AND ACETAMINOPHEN 5; 325 MG/1; MG/1
TABLET ORAL
COMMUNITY

## 2023-08-08 RX ORDER — PANCRELIPASE LIPASE, PANCRELIPASE PROTEASE, PANCRELIPASE AMYLASE 40000; 126000; 168000 [USP'U]/1; [USP'U]/1; [USP'U]/1
CAPSULE, DELAYED RELEASE ORAL
COMMUNITY
Start: 2023-07-19

## 2023-08-09 ENCOUNTER — OFFICE VISIT (OUTPATIENT)
Dept: OBGYN CLINIC | Facility: CLINIC | Age: 58
End: 2023-08-09
Payer: COMMERCIAL

## 2023-08-09 VITALS
BODY MASS INDEX: 24.26 KG/M2 | WEIGHT: 189 LBS | DIASTOLIC BLOOD PRESSURE: 83 MMHG | HEART RATE: 74 BPM | SYSTOLIC BLOOD PRESSURE: 138 MMHG | HEIGHT: 74 IN

## 2023-08-09 DIAGNOSIS — M25.461 EFFUSION OF RIGHT KNEE: ICD-10-CM

## 2023-08-09 DIAGNOSIS — M17.11 PRIMARY OSTEOARTHRITIS OF RIGHT KNEE: Primary | ICD-10-CM

## 2023-08-09 DIAGNOSIS — M05.79 RHEUMATOID ARTHRITIS INVOLVING MULTIPLE SITES WITH POSITIVE RHEUMATOID FACTOR (HCC): ICD-10-CM

## 2023-08-09 PROCEDURE — 20610 DRAIN/INJ JOINT/BURSA W/O US: CPT | Performed by: FAMILY MEDICINE

## 2023-08-09 PROCEDURE — 99204 OFFICE O/P NEW MOD 45 MIN: CPT | Performed by: FAMILY MEDICINE

## 2023-08-09 RX ORDER — PREDNISONE 5 MG/1
TABLET ORAL
COMMUNITY
Start: 2023-07-26

## 2023-08-09 RX ORDER — TRIAMCINOLONE ACETONIDE 40 MG/ML
40 INJECTION, SUSPENSION INTRA-ARTICULAR; INTRAMUSCULAR
Status: COMPLETED | OUTPATIENT
Start: 2023-08-09 | End: 2023-08-09

## 2023-08-09 RX ADMIN — TRIAMCINOLONE ACETONIDE 40 MG: 40 INJECTION, SUSPENSION INTRA-ARTICULAR; INTRAMUSCULAR at 13:00

## 2023-08-09 NOTE — PROGRESS NOTES
Large joint arthrocentesis: R knee  Universal Protocol:  Consent: Verbal consent obtained. Risks and benefits: risks, benefits and alternatives were discussed  Consent given by: patient    Supporting Documentation  Indications: pain   Procedure Details  Location: knee - R knee  Preparation: Patient was prepped and draped in the usual sterile fashion  Needle size: 22 G  Ultrasound guidance: no  Approach: anterolateral  Medications administered: 40 mg triamcinolone acetonide 40 mg/mL (4 ml ropivicaine)    Patient tolerance: patient tolerated the procedure well with no immediate complications    Risks and benefits of CSI were discussed with patient extensively. Risks were highlighted which included but were not limited to infection, pain, local site swelling, and chance that injection may not be effective. Patient was also counseled regarding glucose elevation days after receiving CSI and to be mindful of diet and check sugars daily. Patient agreeable to proceed with CSI after counseling.

## 2023-08-09 NOTE — PROGRESS NOTES
Subjective:    Chief Complaint   Patient presents with   • Right Knee - Pain       Ernesto Bridges is a 56 y.o. male is presenting today for initial evaluation consultation for right knee pain. Patient states that the symptoms have began over 6 months ago without any inciting injury. States that he has a history of rheumatoid arthritis and is following with independent rheumatologist in [facility]. He is on DMARDs medication. States that he has been seen in the emergency room twice over the last 6 months for atraumatic knee effusion. Has had knee aspirated on 2 occasions and send fluid for analysis which was unremarkable for infectious etiology, crystal,  and Lyme's and consistent with inflammatory etiology. He denies any fevers or chills. States that the knee swelling has improved however he does have pain with ambulation. The following portions of the patient's history were reviewed and updated as appropriate: allergies, current medications, past family history, past medical history, past social history, past surgical history and problem list.    Occupation:      Review of Systems   Constitutional: Negative for fever. Objective:  /83   Pulse 74   Ht 6' 2" (1.88 m)   Wt 85.7 kg (189 lb)   BMI 24.27 kg/m²   Skin: no rashes, lesions, skin discolorations, lacerations  Vasculature: normal popliteal and pedal pulse, normal skin color, normal capillary refill in extremity, no lower extremity edema  Neurologic: Neurologic exam is normal throughout lower extremities, Awake, alert, and oriented x3, no apparent distress. Musculoskeletal: right KNEE EXAM  Gait: limping gait negative  Inspection:No erythema, no induration. There is no gross deformity. Palpation: Swelling: negative. Effusion: trace . Medial joint line TTP: positive  Lateral joint line TTP: negative  Positive patellar grind  ROM: Full flexion. extension limited by 5 degrees  Special tests: Neena's: negative  Instability to varus/valgus stress: negative  Anterior Drawer: negative Lachman's test: negative  Posterior Drawer: negative        Imaging:       Assessment/Plan:  1. Primary osteoarthritis of right knee    - Ambulatory Referral to Orthopedic Surgery    2. Rheumatoid arthritis involving multiple sites with positive rheumatoid factor (HCC)      3. Effusion of right knee      Radiographs from the emergency room were reviewed independently. There was moderate degree of osteoarthritis noted most notable in the patellofemoral joint and medial compartment. Prior emergency room notes and lab results reviewed. Knee aspiration were reviewed and seem to be consistent with inflammatory etiology due to underlying rheumatoid arthritis. Patient has no redness or warmth of the affected extremity and minimal effusion in office today. He has no systemic symptoms of fevers or chills. We discussed that recurrent atraumatic knee effusions are most likely resulting from underlying knee osteoarthritis. We discussed treating the knee osteoarthritis with cortisone injection and patient is agreeable after counseling. CSI performed in office today without complication. I did discuss with patient to have him follow-up with rheumatology in regards to management of underlying rheumatoid arthritis.   Return precautions provided

## 2023-11-02 RX ORDER — CYCLOBENZAPRINE HCL 5 MG
5 TABLET ORAL 3 TIMES DAILY PRN
COMMUNITY
Start: 2023-10-30 | End: 2023-11-06

## 2023-11-06 ENCOUNTER — OFFICE VISIT (OUTPATIENT)
Dept: PAIN MEDICINE | Facility: CLINIC | Age: 58
End: 2023-11-06
Payer: COMMERCIAL

## 2023-11-06 VITALS
HEIGHT: 74 IN | DIASTOLIC BLOOD PRESSURE: 72 MMHG | BODY MASS INDEX: 24.13 KG/M2 | WEIGHT: 188 LBS | SYSTOLIC BLOOD PRESSURE: 102 MMHG | RESPIRATION RATE: 16 BRPM | HEART RATE: 111 BPM

## 2023-11-06 DIAGNOSIS — M54.12 CERVICAL RADICULOPATHY: ICD-10-CM

## 2023-11-06 DIAGNOSIS — M47.812 CERVICAL SPONDYLOSIS: ICD-10-CM

## 2023-11-06 DIAGNOSIS — G89.4 CHRONIC PAIN SYNDROME: Primary | ICD-10-CM

## 2023-11-06 DIAGNOSIS — M05.79 RHEUMATOID ARTHRITIS INVOLVING MULTIPLE SITES WITH POSITIVE RHEUMATOID FACTOR (HCC): ICD-10-CM

## 2023-11-06 DIAGNOSIS — M54.2 NECK PAIN: ICD-10-CM

## 2023-11-06 PROCEDURE — 99214 OFFICE O/P EST MOD 30 MIN: CPT | Performed by: NURSE PRACTITIONER

## 2023-11-06 RX ORDER — PREDNISONE 10 MG/1
TABLET ORAL
Qty: 15 TABLET | Refills: 0 | Status: SHIPPED | OUTPATIENT
Start: 2023-11-06

## 2023-11-06 RX ORDER — PREDNISONE 5 MG/1
TABLET ORAL
Qty: 15 TABLET | Refills: 0
Start: 2023-11-06

## 2023-11-06 NOTE — PROGRESS NOTES
Assessment:  1. Chronic pain syndrome    2. Neck pain    3. Cervical spondylosis    4. Cervical radiculopathy    5. Rheumatoid arthritis involving multiple sites with positive rheumatoid factor (HCC)        Plan:  While the patient was in the office today, I did have a thorough conversation regarding their chronic pain syndrome, medication management, and treatment plan options. Patient is being seen for a follow-up visit. He was initially seen here for consultation on 6/1/2023. He was referred to physical therapy for his cervical spine. Patient never started physical therapy. He states that he sees a chiropractor on a regular basis for his low back, but not his neck. Patient states that his neck pain became pretty tolerable until about a week ago. He has been experiencing increased pain in the neck that radiates down both upper extremities. Again, recommend starting physical therapy for the cervical spine. He is scheduled for an dated MRI of the cervical spine in the near future. I discussed with the patient that at this point time since I feel that there is a significant inflammatory component to their pain symptoms, that they would benefit from a titrating dose of oral steroids over the next 7 days. I advised the patient that while on the steroids, they should not take any other oral NSAIDs except for acetaminophen or Tylenol until they have completed the steroid taper. I also advised them that once they have completed the steroid taper, they are to give our office a follow up phone call to let us know how they are doing and if there is any improvement. The patient was agreeable and verbalized an understanding. Continue methocarbamol 500 mg 4 times daily if needed for spasms. Follow up in 6 weeks. History of Present Illness: The patient is a 56 y.o. male who presents for a follow up office visit in regards to Follow-up.    The patient’s current symptoms include complaints of neck pain that radiates to both upper extremities. Current pain level is a 9/10. Quality pain is described as burning, pressure-like. Current pain medications includes: Patient takes prednisone 2.5 mg daily, Robaxin 500 mg 4 times daily if needed for spasms. The patient reports that this regimen is providing 30% pain relief. The patient is reporting no side effects from this pain medication regimen. I have personally reviewed and/or updated the patient's past medical history, past surgical history, family history, social history, current medications, allergies, and vital signs today. Review of Systems  Review of Systems   Musculoskeletal:  Positive for neck pain. Decrease ROM  Joint stiffness   Neurological:  Positive for weakness. All other systems reviewed and are negative.           Past Medical History:   Diagnosis Date    Arthritis     Pancreatitis     RA (rheumatoid arthritis) ([address])     Rheumatoid arthritis        Past Surgical History:   Procedure Laterality Date    CHOLECYSTECTOMY      FINGER SURGERY Right     fourth digit       Family History   Problem Relation Age of Onset    Cancer Mother     Cancer Father        Social History     Occupational History    Not on file   Tobacco Use    Smoking status: Never    Smokeless tobacco: Never   Vaping Use    Vaping Use: Never used   Substance and Sexual Activity    Alcohol use: Not Currently     Alcohol/week: 2.0 standard drinks of alcohol     Types: 2 Cans of beer per week    Drug use: Never    Sexual activity: Yes     Partners: Female     Birth control/protection: None         Current Outpatient Medications:     Amylase-Lipase-Protease (PANCRELIPASE 27371 PO), Take by mouth, Disp: , Rfl:     Certolizumab Pegol (CIMZIA PREFILLED SC), Inject under the skin, Disp: , Rfl:     DULoxetine (CYMBALTA) 60 mg delayed release capsule, Take 60 mg by mouth daily, Disp: , Rfl:     ketorolac (TORADOL) 10 mg tablet, Take 1 tablet (10 mg total) by mouth every 6 (six) hours as needed for moderate pain, Disp: 60 tablet, Rfl: 0    leflunomide (ARAVA) 10 MG tablet, Take 10 mg by mouth 2 (two) times a day, Disp: , Rfl:     leflunomide (ARAVA) 20 MG tablet, Take 20 mg by mouth daily, Disp: , Rfl:     methocarbamol (ROBAXIN) 500 mg tablet, Take 1 tablet (500 mg total) by mouth 4 (four) times a day, Disp: 120 tablet, Rfl: 0    pantoprazole (PROTONIX) 40 mg tablet, Take 40 mg by mouth daily, Disp: , Rfl:     predniSONE 10 mg tablet, 3 tabs once daily x 3 days; 2 tabs once daily x 2 days; 1 tab daily x 2 days, Disp: 15 tablet, Rfl: 0    predniSONE 5 mg tablet, One half tablet daily. , Disp: 15 tablet, Rfl: 0    Zenpep 98210-935116 units CPEP, , Disp: , Rfl:     amoxicillin-clavulanate (AUGMENTIN) 875-125 mg per tablet, , Disp: , Rfl:     No Known Allergies    Physical Exam:    /72   Pulse (!) 111   Resp 16   Ht 6' 2" (1.88 m)   Wt 85.3 kg (188 lb)   BMI 24.14 kg/m²     Constitutional:normal, well developed, well nourished, alert, in no distress and non-toxic and no overt pain behavior.   Eyes:anicteric  HEENT:grossly intact  Neck:supple, symmetric, trachea midline and no masses   Pulmonary:even and unlabored  Cardiovascular:No edema or pitting edema present  Skin:Normal without rashes or lesions and well hydrated  Psychiatric:Mood and affect appropriate  Neurologic:Cranial Nerves II-XII grossly intact  Musculoskeletal:normal    Imaging  No orders to display       Orders Placed This Encounter   Procedures    Ambulatory Referral to Physical Therapy

## 2024-09-18 ENCOUNTER — HOSPITAL ENCOUNTER (EMERGENCY)
Facility: HOSPITAL | Age: 59
Discharge: HOME | End: 2024-09-18
Attending: EMERGENCY MEDICINE
Payer: COMMERCIAL

## 2024-09-18 VITALS
RESPIRATION RATE: 18 BRPM | SYSTOLIC BLOOD PRESSURE: 133 MMHG | TEMPERATURE: 98.2 F | OXYGEN SATURATION: 97 % | HEART RATE: 82 BPM | DIASTOLIC BLOOD PRESSURE: 76 MMHG

## 2024-09-18 DIAGNOSIS — M54.32 SCIATICA OF LEFT SIDE: Primary | ICD-10-CM

## 2024-09-18 LAB
BILIRUB UR QL STRIP: NEGATIVE
CLARITY UR: CLEAR
COLOR UR: YELLOW
GLUCOSE UR STRIP-MCNC: NEGATIVE MG/DL
HGB UR QL STRIP.AUTO: NEGATIVE
KETONES UR STRIP-MCNC: NEGATIVE MG/DL
LEUKOCYTE ESTERASE UR QL STRIP: NEGATIVE
NITRITE UR QL STRIP: NEGATIVE
PH UR STRIP.AUTO: 5.5 [PH]
PROT UR STRIP-MCNC: NEGATIVE MG/DL
SP GR UR STRIP.AUTO: >=1.03 (ref 1–1.03)
UROBILINOGEN UR STRIP-ACNC: <2 MG/DL

## 2024-09-18 PROCEDURE — 99283 EMERGENCY DEPT VISIT LOW MDM: CPT

## 2024-09-18 PROCEDURE — 81003 URINALYSIS AUTO W/O SCOPE: CPT | Performed by: EMERGENCY MEDICINE

## 2024-09-18 PROCEDURE — 99284 EMERGENCY DEPT VISIT MOD MDM: CPT | Performed by: EMERGENCY MEDICINE

## 2024-09-18 RX ORDER — PREDNISONE 20 MG/1
60 TABLET ORAL ONCE
Status: COMPLETED | OUTPATIENT
Start: 2024-09-18 | End: 2024-09-18

## 2024-09-18 RX ORDER — GABAPENTIN 300 MG/1
300 CAPSULE ORAL 3 TIMES DAILY
Qty: 90 CAPSULE | Refills: 0 | Status: SHIPPED | OUTPATIENT
Start: 2024-09-18 | End: 2024-10-18

## 2024-09-18 RX ORDER — METHOTREXATE 2.5 MG/1
2.5 TABLET ORAL
COMMUNITY

## 2024-09-18 RX ORDER — METHYLPREDNISOLONE 4 MG
TABLET, DOSE PACK ORAL
Qty: 21 TABLET | Refills: 0 | Status: SHIPPED | OUTPATIENT
Start: 2024-09-18

## 2024-09-18 RX ADMIN — PREDNISONE 60 MG: 20 TABLET ORAL at 10:51

## 2024-09-18 NOTE — ED PROVIDER NOTES
1. Sciatica of left side      ED Disposition       ED Disposition   Discharge    Condition   Stable    Date/Time   Wed Sep 18, 2024 11:29 AM    Comment   Deric Salamanca discharge to home/self care.                   Assessment & Plan       Medical Decision Making  58-year-old male presenting with left-sided sciatica type pain    Problems Addressed:  Sciatica of left side: acute illness or injury    Amount and/or Complexity of Data Reviewed  External Data Reviewed: radiology and notes.     Details: Reviewed previous cervical and lumbar spine MRIs.  Reviewed colonoscopy note from [facility] late July of this year  Labs: ordered. Decision-making details documented in ED Course.     Details: No evidence for UTI, prostatitis    Risk  OTC drugs.  Prescription drug management.  Decision regarding hospitalization.  Risk Details: Will treat with Medrol Dosepak, Neurontin.  Ambulatory referral to [facility] as well as pain and spine management.  Return if condition worsens.      I personally discussed return precautions with this patient and family. I provided the patient with written discharge instructions and particularly highlighted specific areas of interest to this patient, including but not limited to: medications for symptom managment, follow up recommendations, and return precautions. Patient and family are in agreement with this plan as outlined above.                 Medications   predniSONE tablet 60 mg (60 mg Oral Given 9/18/24 1051)       History of Present Illness       Patient is a 58-year-old male presenting with 3 to 4 days of left leg pain.  Denies any recent injury or illness.  Pain radiates from its point of origination in the left upper buttocks and rating down the left posterior thigh region.  No weakness or numbness.  No bowel or bladder continence or retention.  No perennial anesthesia.  Has previously been diagnosed with sciatica.  Pain worse with movement and bending.               Review of Systems    Constitutional:  Negative for activity change, appetite change, chills and fever.   HENT:  Negative for ear pain, hearing loss, rhinorrhea, sneezing, sore throat and trouble swallowing.    Eyes:  Negative for pain and visual disturbance.   Respiratory:  Negative for cough, choking, chest tightness, shortness of breath, wheezing and stridor.    Cardiovascular:  Negative for chest pain, palpitations and leg swelling.   Gastrointestinal:  Negative for abdominal pain, constipation, diarrhea, nausea and vomiting.   Genitourinary:  Negative for difficulty urinating, dysuria, frequency, hematuria and testicular pain.   Musculoskeletal:  Positive for back pain. Negative for arthralgias, gait problem and neck pain.   Skin:  Negative for color change and rash.   Allergic/Immunologic: Negative for immunocompromised state.   Neurological:  Negative for dizziness, seizures, syncope, speech difficulty, weakness, light-headedness, numbness and headaches.   All other systems reviewed and are negative.          Objective     ED Triage Vitals [09/18/24 1009]   Temperature Pulse Blood Pressure Respirations SpO2 Patient Position - Orthostatic VS   98.2 °F (36.8 °C) 82 133/76 18 97 % Sitting      Temp Source Heart Rate Source BP Location FiO2 (%) Pain Score    Temporal Monitor Left arm -- 7        Physical Exam  Vitals and nursing note reviewed.   Constitutional:       General: He is not in acute distress.     Appearance: Normal appearance. He is well-developed and normal weight. He is not ill-appearing, toxic-appearing or diaphoretic.   HENT:      Head: Normocephalic and atraumatic.      Nose: Nose normal.      Mouth/Throat:      Mouth: Mucous membranes are moist.      Pharynx: Oropharynx is clear.   Eyes:      General: No scleral icterus.     Extraocular Movements: Extraocular movements intact.      Conjunctiva/sclera: Conjunctivae normal.   Cardiovascular:      Rate and Rhythm: Normal rate and regular rhythm.      Pulses: Normal  pulses.      Heart sounds: Normal heart sounds. No murmur heard.  Pulmonary:      Effort: Pulmonary effort is normal. No respiratory distress.      Breath sounds: Normal breath sounds. No wheezing or rales.   Chest:      Chest wall: No tenderness.   Abdominal:      General: Bowel sounds are normal. There is no distension.      Palpations: Abdomen is soft. There is no mass.      Tenderness: There is no abdominal tenderness. There is no right CVA tenderness, left CVA tenderness, guarding or rebound.   Musculoskeletal:         General: No tenderness, deformity or signs of injury. Normal range of motion.      Cervical back: Normal range of motion and neck supple. No rigidity or tenderness.        Back:       Right lower leg: No edema.      Left lower leg: No edema.        Legs:    Lymphadenopathy:      Cervical: No cervical adenopathy.   Skin:     General: Skin is warm and dry.      Capillary Refill: Capillary refill takes less than 2 seconds.      Coloration: Skin is not jaundiced or pale.      Findings: No bruising, erythema, lesion or rash.   Neurological:      General: No focal deficit present.      Mental Status: He is alert and oriented to person, place, and time. Mental status is at baseline.      Motor: No abnormal muscle tone.   Psychiatric:         Mood and Affect: Mood normal.         Behavior: Behavior normal.         Labs Reviewed   UA W REFLEX TO MICROSCOPIC WITH REFLEX TO CULTURE       Result Value    Color, UA Yellow      Clarity, UA Clear      Specific Gravity, UA >=1.030      pH, UA 5.5      Leukocytes, UA Negative      Nitrite, UA Negative      Protein, UA Negative      Glucose, UA Negative      Ketones, UA Negative      Urobilinogen, UA <2.0      Bilirubin, UA Negative      Occult Blood, UA Negative       No orders to display       Procedures       Loyd Duran [facility], DO  09/18/24 4013

## 2024-09-18 NOTE — DISCHARGE INSTRUCTIONS
Please do not take your 2.5 mg prednisone tablets while doing the additional methyl prednisone dosing.  Please follow[facility] with [facility].  Please return to the emergency department if condition worsens.

## 2024-09-18 NOTE — Clinical Note
Deric Salamanca was seen and treated in our emergency department on 9/18/2024.                Diagnosis:     Deric  may return to work on return date.    He may return on this date: 09/23/2024    Please excuse the week of 9/16/24 - 9/20/24     If you have any questions or concerns, please don't hesitate to call.      Loyd Preston, DO    ______________________________           _______________          _______________  Hospital Representative                              Date                                Time

## 2024-09-19 ENCOUNTER — TELEPHONE (OUTPATIENT)
Dept: PHYSICAL THERAPY | Facility: OTHER | Age: 59
End: 2024-09-19

## 2024-09-19 NOTE — TELEPHONE ENCOUNTER
Call placed to the patient per [facility] referral.    V/m left for patient to call back. Phone number and hours of business provided.    This is the 1st attempt to reach the patient. Will defer referral per protocol.    Hx of sciatica.    Pt has a referral for PM but no appt scheduled yet.

## 2024-09-26 NOTE — TELEPHONE ENCOUNTER
Call placed to the patient per [facility] referral.     V/m left for patient to call back. Phone number and hours of business provided.     This is the 2nd attempt to reach the patient. Will close referral per protocol.     Hx of sciatica.     Pt has a referral for PM but no appt scheduled yet.    Pt has an appt scheduled with FM on 10/11.

## 2024-12-25 ENCOUNTER — HOSPITAL ENCOUNTER (INPATIENT)
Facility: HOSPITAL | Age: 59
LOS: 2 days | Discharge: HOME | DRG: 871 | End: 2024-12-28
Attending: EMERGENCY MEDICINE | Admitting: INTERNAL MEDICINE
Payer: COMMERCIAL

## 2024-12-25 ENCOUNTER — APPOINTMENT (EMERGENCY)
Dept: RADIOLOGY | Facility: HOSPITAL | Age: 59
DRG: 871 | End: 2024-12-25
Payer: COMMERCIAL

## 2024-12-25 DIAGNOSIS — I49.8 SINUS ARRHYTHMIA: ICD-10-CM

## 2024-12-25 DIAGNOSIS — R65.10 SIRS (SYSTEMIC INFLAMMATORY RESPONSE SYNDROME) (HCC): Primary | ICD-10-CM

## 2024-12-25 DIAGNOSIS — G89.29 ACUTE ON CHRONIC BACK PAIN: ICD-10-CM

## 2024-12-25 DIAGNOSIS — R59.1 LYMPHADENOPATHY: ICD-10-CM

## 2024-12-25 DIAGNOSIS — A41.9 SEPSIS WITHOUT ACUTE ORGAN DYSFUNCTION, DUE TO UNSPECIFIED ORGANISM (HCC): ICD-10-CM

## 2024-12-25 DIAGNOSIS — M05.79 RHEUMATOID ARTHRITIS INVOLVING MULTIPLE SITES WITH POSITIVE RHEUMATOID FACTOR (HCC): ICD-10-CM

## 2024-12-25 DIAGNOSIS — R91.8 MULTIPLE PULMONARY NODULES: ICD-10-CM

## 2024-12-25 DIAGNOSIS — M47.812 CERVICAL SPONDYLOSIS: ICD-10-CM

## 2024-12-25 DIAGNOSIS — J18.9 PNEUMONIA: ICD-10-CM

## 2024-12-25 DIAGNOSIS — M79.10 MYALGIA: ICD-10-CM

## 2024-12-25 DIAGNOSIS — M54.12 CERVICAL RADICULOPATHY: ICD-10-CM

## 2024-12-25 DIAGNOSIS — M48.02 CERVICAL SPINAL STENOSIS: ICD-10-CM

## 2024-12-25 DIAGNOSIS — M54.9 ACUTE ON CHRONIC BACK PAIN: ICD-10-CM

## 2024-12-25 DIAGNOSIS — R74.01 TRANSAMINITIS: ICD-10-CM

## 2024-12-25 DIAGNOSIS — R50.9 FEVER AND CHILLS: ICD-10-CM

## 2024-12-25 DIAGNOSIS — D69.6 THROMBOCYTOPENIA (HCC): ICD-10-CM

## 2024-12-25 DIAGNOSIS — K86.2 PANCREATIC CYST: ICD-10-CM

## 2024-12-25 LAB
ALBUMIN SERPL BCG-MCNC: 3.3 G/DL (ref 3.5–5)
ALP SERPL-CCNC: 74 U/L (ref 34–104)
ALT SERPL W P-5'-P-CCNC: 20 U/L (ref 7–52)
ANION GAP SERPL CALCULATED.3IONS-SCNC: 8 MMOL/L (ref 4–13)
AST SERPL W P-5'-P-CCNC: 16 U/L (ref 13–39)
BASOPHILS # BLD AUTO: 0.01 THOUSANDS/ÂΜL (ref 0–0.1)
BASOPHILS NFR BLD AUTO: 0 % (ref 0–1)
BILIRUB SERPL-MCNC: 2.02 MG/DL (ref 0.2–1)
BUN SERPL-MCNC: 17 MG/DL (ref 5–25)
CALCIUM ALBUM COR SERPL-MCNC: 8.8 MG/DL (ref 8.3–10.1)
CALCIUM SERPL-MCNC: 8.2 MG/DL (ref 8.4–10.2)
CHLORIDE SERPL-SCNC: 105 MMOL/L (ref 96–108)
CK SERPL-CCNC: 61 U/L (ref 39–308)
CO2 SERPL-SCNC: 20 MMOL/L (ref 21–32)
CREAT SERPL-MCNC: 1.07 MG/DL (ref 0.6–1.3)
CRP SERPL QL: 4.8 MG/L
EOSINOPHIL # BLD AUTO: 0.02 THOUSAND/ÂΜL (ref 0–0.61)
EOSINOPHIL NFR BLD AUTO: 0 % (ref 0–6)
ERYTHROCYTE [DISTWIDTH] IN BLOOD BY AUTOMATED COUNT: 13.9 % (ref 11.6–15.1)
ERYTHROCYTE [SEDIMENTATION RATE] IN BLOOD: 12 MM/HOUR (ref 0–19)
FLUAV AG UPPER RESP QL IA.RAPID: NEGATIVE
FLUBV AG UPPER RESP QL IA.RAPID: NEGATIVE
GFR SERPL CREATININE-BSD FRML MDRD: 76 ML/MIN/1.73SQ M
GLUCOSE SERPL-MCNC: 121 MG/DL (ref 65–140)
HCT VFR BLD AUTO: 39.8 % (ref 36.5–49.3)
HGB BLD-MCNC: 13.4 G/DL (ref 12–17)
IMM GRANULOCYTES # BLD AUTO: 0.06 THOUSAND/UL (ref 0–0.2)
IMM GRANULOCYTES NFR BLD AUTO: 1 % (ref 0–2)
LACTATE SERPL-SCNC: 1.2 MMOL/L (ref 0.5–2)
LIPASE SERPL-CCNC: <6 U/L (ref 11–82)
LYMPHOCYTES # BLD AUTO: 0.54 THOUSANDS/ÂΜL (ref 0.6–4.47)
LYMPHOCYTES NFR BLD AUTO: 5 % (ref 14–44)
MCH RBC QN AUTO: 30 PG (ref 26.8–34.3)
MCHC RBC AUTO-ENTMCNC: 33.7 G/DL (ref 31.4–37.4)
MCV RBC AUTO: 89 FL (ref 82–98)
MONOCYTES # BLD AUTO: 0.59 THOUSAND/ÂΜL (ref 0.17–1.22)
MONOCYTES NFR BLD AUTO: 5 % (ref 4–12)
NEUTROPHILS # BLD AUTO: 9.66 THOUSANDS/ÂΜL (ref 1.85–7.62)
NEUTS SEG NFR BLD AUTO: 89 % (ref 43–75)
NRBC BLD AUTO-RTO: 0 /100 WBCS
PLATELET # BLD AUTO: 147 THOUSANDS/UL (ref 149–390)
PMV BLD AUTO: 9.2 FL (ref 8.9–12.7)
POTASSIUM SERPL-SCNC: 3.2 MMOL/L (ref 3.5–5.3)
PROCALCITONIN SERPL-MCNC: 3.95 NG/ML
PROT SERPL-MCNC: 5.5 G/DL (ref 6.4–8.4)
RBC # BLD AUTO: 4.47 MILLION/UL (ref 3.88–5.62)
S PYO DNA THROAT QL NAA+PROBE: NOT DETECTED
SARS-COV+SARS-COV-2 AG RESP QL IA.RAPID: NEGATIVE
SODIUM SERPL-SCNC: 133 MMOL/L (ref 135–147)
TSH SERPL DL<=0.05 MIU/L-ACNC: 1.94 UIU/ML (ref 0.45–4.5)
WBC # BLD AUTO: 10.88 THOUSAND/UL (ref 4.31–10.16)

## 2024-12-25 PROCEDURE — 82550 ASSAY OF CK (CPK): CPT | Performed by: EMERGENCY MEDICINE

## 2024-12-25 PROCEDURE — 71045 X-RAY EXAM CHEST 1 VIEW: CPT

## 2024-12-25 PROCEDURE — 83690 ASSAY OF LIPASE: CPT | Performed by: EMERGENCY MEDICINE

## 2024-12-25 PROCEDURE — 87804 INFLUENZA ASSAY W/OPTIC: CPT | Performed by: EMERGENCY MEDICINE

## 2024-12-25 PROCEDURE — 99285 EMERGENCY DEPT VISIT HI MDM: CPT | Performed by: EMERGENCY MEDICINE

## 2024-12-25 PROCEDURE — 80053 COMPREHEN METABOLIC PANEL: CPT | Performed by: EMERGENCY MEDICINE

## 2024-12-25 PROCEDURE — 96365 THER/PROPH/DIAG IV INF INIT: CPT

## 2024-12-25 PROCEDURE — 84443 ASSAY THYROID STIM HORMONE: CPT | Performed by: EMERGENCY MEDICINE

## 2024-12-25 PROCEDURE — 36415 COLL VENOUS BLD VENIPUNCTURE: CPT | Performed by: EMERGENCY MEDICINE

## 2024-12-25 PROCEDURE — 99285 EMERGENCY DEPT VISIT HI MDM: CPT

## 2024-12-25 PROCEDURE — 87040 BLOOD CULTURE FOR BACTERIA: CPT | Performed by: EMERGENCY MEDICINE

## 2024-12-25 PROCEDURE — 84145 PROCALCITONIN (PCT): CPT | Performed by: EMERGENCY MEDICINE

## 2024-12-25 PROCEDURE — 87811 SARS-COV-2 COVID19 W/OPTIC: CPT | Performed by: EMERGENCY MEDICINE

## 2024-12-25 PROCEDURE — 83605 ASSAY OF LACTIC ACID: CPT | Performed by: EMERGENCY MEDICINE

## 2024-12-25 PROCEDURE — 96375 TX/PRO/DX INJ NEW DRUG ADDON: CPT

## 2024-12-25 PROCEDURE — 85652 RBC SED RATE AUTOMATED: CPT | Performed by: EMERGENCY MEDICINE

## 2024-12-25 PROCEDURE — 85025 COMPLETE CBC W/AUTO DIFF WBC: CPT | Performed by: EMERGENCY MEDICINE

## 2024-12-25 PROCEDURE — 87651 STREP A DNA AMP PROBE: CPT | Performed by: EMERGENCY MEDICINE

## 2024-12-25 PROCEDURE — 86140 C-REACTIVE PROTEIN: CPT | Performed by: EMERGENCY MEDICINE

## 2024-12-25 RX ORDER — FENTANYL CITRATE 50 UG/ML
50 INJECTION, SOLUTION INTRAMUSCULAR; INTRAVENOUS ONCE
Refills: 0 | Status: COMPLETED | OUTPATIENT
Start: 2024-12-25 | End: 2024-12-25

## 2024-12-25 RX ORDER — CEFEPIME HYDROCHLORIDE 2 G/50ML
2000 INJECTION, SOLUTION INTRAVENOUS ONCE
Status: COMPLETED | OUTPATIENT
Start: 2024-12-25 | End: 2024-12-25

## 2024-12-25 RX ORDER — ONDANSETRON 2 MG/ML
4 INJECTION INTRAMUSCULAR; INTRAVENOUS ONCE
Status: COMPLETED | OUTPATIENT
Start: 2024-12-25 | End: 2024-12-25

## 2024-12-25 RX ORDER — DEXAMETHASONE 4 MG/1
10 TABLET ORAL ONCE
Status: COMPLETED | OUTPATIENT
Start: 2024-12-25 | End: 2024-12-25

## 2024-12-25 RX ORDER — HYDROMORPHONE HCL/PF 1 MG/ML
1 SYRINGE (ML) INJECTION ONCE
Status: DISCONTINUED | OUTPATIENT
Start: 2024-12-25 | End: 2024-12-25

## 2024-12-25 RX ORDER — IBUPROFEN 800 MG/1
800 TABLET, FILM COATED ORAL ONCE
Status: COMPLETED | OUTPATIENT
Start: 2024-12-25 | End: 2024-12-25

## 2024-12-25 RX ORDER — DIAZEPAM 10 MG/2ML
5 INJECTION, SOLUTION INTRAMUSCULAR; INTRAVENOUS ONCE
Status: COMPLETED | OUTPATIENT
Start: 2024-12-25 | End: 2024-12-25

## 2024-12-25 RX ORDER — HYDROMORPHONE HCL/PF 1 MG/ML
1 SYRINGE (ML) INJECTION ONCE
Status: COMPLETED | OUTPATIENT
Start: 2024-12-25 | End: 2024-12-25

## 2024-12-25 RX ORDER — ACETAMINOPHEN 325 MG/1
975 TABLET ORAL ONCE
Status: COMPLETED | OUTPATIENT
Start: 2024-12-25 | End: 2024-12-25

## 2024-12-25 RX ADMIN — DIAZEPAM 5 MG: 5 INJECTION INTRAMUSCULAR; INTRAVENOUS at 21:40

## 2024-12-25 RX ADMIN — ACETAMINOPHEN 975 MG: 325 TABLET, FILM COATED ORAL at 22:21

## 2024-12-25 RX ADMIN — HYDROMORPHONE HYDROCHLORIDE 1 MG: 1 INJECTION, SOLUTION INTRAMUSCULAR; INTRAVENOUS; SUBCUTANEOUS at 21:40

## 2024-12-25 RX ADMIN — FENTANYL CITRATE 50 MCG: 50 INJECTION INTRAMUSCULAR; INTRAVENOUS at 23:15

## 2024-12-25 RX ADMIN — SODIUM CHLORIDE 1000 ML: 0.9 INJECTION, SOLUTION INTRAVENOUS at 23:07

## 2024-12-25 RX ADMIN — ONDANSETRON 4 MG: 2 INJECTION INTRAMUSCULAR; INTRAVENOUS at 23:12

## 2024-12-25 RX ADMIN — DEXAMETHASONE 10 MG: 4 TABLET ORAL at 21:44

## 2024-12-25 RX ADMIN — CEFEPIME HYDROCHLORIDE 2000 MG: 2 INJECTION, SOLUTION INTRAVENOUS at 23:22

## 2024-12-25 RX ADMIN — IBUPROFEN 800 MG: 800 TABLET, FILM COATED ORAL at 21:44

## 2024-12-25 NOTE — Clinical Note
Case was discussed with JANINE and the patient's admission status was agreed to be Admission Status: inpatient status to the service of Dr. Alcantara

## 2024-12-25 NOTE — LETTER
[facility] MEDICAL SURGICAL UNIT  [address] [address]  Dept: [phone]    December 28, 2024     Patient: Deric Salamanca   YOB: 1965   Date of Visit: 12/25/2024       To Whom it May Concern:    Deric Salamanca is under my professional care. He was seen in the hospital from 12/25/2024 to 12/28/24. He may return to work on Tuesday, 12/31/2024, without limitations.    If you have any questions or concerns, please don't hesitate to call.         Sincerely,          Law Bliss, DO

## 2024-12-26 ENCOUNTER — APPOINTMENT (INPATIENT)
Dept: CT IMAGING | Facility: HOSPITAL | Age: 59
DRG: 871 | End: 2024-12-26
Payer: COMMERCIAL

## 2024-12-26 ENCOUNTER — APPOINTMENT (INPATIENT)
Dept: MRI IMAGING | Facility: HOSPITAL | Age: 59
DRG: 871 | End: 2024-12-26
Payer: COMMERCIAL

## 2024-12-26 PROBLEM — A41.9 SEPSIS (HCC): Status: ACTIVE | Noted: 2024-12-26

## 2024-12-26 PROBLEM — M48.02 CERVICAL SPINAL STENOSIS: Status: ACTIVE | Noted: 2024-12-26

## 2024-12-26 PROBLEM — D69.6 THROMBOCYTOPENIA (HCC): Status: ACTIVE | Noted: 2024-12-26

## 2024-12-26 LAB
ALBUMIN SERPL BCG-MCNC: 3.4 G/DL (ref 3.5–5)
ALP SERPL-CCNC: 79 U/L (ref 34–104)
ALT SERPL W P-5'-P-CCNC: 35 U/L (ref 7–52)
ANION GAP SERPL CALCULATED.3IONS-SCNC: 8 MMOL/L (ref 4–13)
AST SERPL W P-5'-P-CCNC: 35 U/L (ref 13–39)
B PARAP IS1001 DNA NPH QL NAA+NON-PROBE: NOT DETECTED
B PERT.PT PRMT NPH QL NAA+NON-PROBE: NOT DETECTED
BACTERIA UR QL AUTO: ABNORMAL /HPF
BASOPHILS # BLD AUTO: 0 THOUSANDS/ÂΜL (ref 0–0.1)
BASOPHILS NFR BLD AUTO: 0 % (ref 0–1)
BILIRUB SERPL-MCNC: 2.29 MG/DL (ref 0.2–1)
BILIRUB UR QL STRIP: NEGATIVE
BUN SERPL-MCNC: 13 MG/DL (ref 5–25)
C PNEUM DNA NPH QL NAA+NON-PROBE: NOT DETECTED
CALCIUM ALBUM COR SERPL-MCNC: 8.7 MG/DL (ref 8.3–10.1)
CALCIUM SERPL-MCNC: 8.2 MG/DL (ref 8.4–10.2)
CHLORIDE SERPL-SCNC: 108 MMOL/L (ref 96–108)
CLARITY UR: CLEAR
CO2 SERPL-SCNC: 18 MMOL/L (ref 21–32)
COLOR UR: YELLOW
CREAT SERPL-MCNC: 0.91 MG/DL (ref 0.6–1.3)
EOSINOPHIL # BLD AUTO: 0 THOUSAND/ÂΜL (ref 0–0.61)
EOSINOPHIL NFR BLD AUTO: 0 % (ref 0–6)
ERYTHROCYTE [DISTWIDTH] IN BLOOD BY AUTOMATED COUNT: 14 % (ref 11.6–15.1)
FLUAV RNA NPH QL NAA+NON-PROBE: NOT DETECTED
FLUBV RNA NPH QL NAA+NON-PROBE: NOT DETECTED
GFR SERPL CREATININE-BSD FRML MDRD: 93 ML/MIN/1.73SQ M
GLUCOSE SERPL-MCNC: 145 MG/DL (ref 65–140)
GLUCOSE UR STRIP-MCNC: NEGATIVE MG/DL
HADV DNA NPH QL NAA+NON-PROBE: NOT DETECTED
HCOV 229E RNA NPH QL NAA+NON-PROBE: NOT DETECTED
HCOV HKU1 RNA NPH QL NAA+NON-PROBE: NOT DETECTED
HCOV NL63 RNA NPH QL NAA+NON-PROBE: NOT DETECTED
HCOV OC43 RNA NPH QL NAA+NON-PROBE: NOT DETECTED
HCT VFR BLD AUTO: 39.6 % (ref 36.5–49.3)
HGB BLD-MCNC: 13.1 G/DL (ref 12–17)
HGB UR QL STRIP.AUTO: ABNORMAL
HMPV RNA NPH QL NAA+NON-PROBE: NOT DETECTED
HPIV1 RNA NPH QL NAA+NON-PROBE: NOT DETECTED
HPIV2 RNA NPH QL NAA+NON-PROBE: NOT DETECTED
HPIV3 RNA NPH QL NAA+NON-PROBE: NOT DETECTED
HPIV4 RNA NPH QL NAA+NON-PROBE: NOT DETECTED
IMM GRANULOCYTES # BLD AUTO: 0.12 THOUSAND/UL (ref 0–0.2)
IMM GRANULOCYTES NFR BLD AUTO: 1 % (ref 0–2)
KETONES UR STRIP-MCNC: NEGATIVE MG/DL
L PNEUMO1 AG UR QL IA.RAPID: NEGATIVE
LEUKOCYTE ESTERASE UR QL STRIP: NEGATIVE
LYMPHOCYTES # BLD AUTO: 0.41 THOUSANDS/ÂΜL (ref 0.6–4.47)
LYMPHOCYTES NFR BLD AUTO: 4 % (ref 14–44)
M PNEUMO DNA NPH QL NAA+NON-PROBE: NOT DETECTED
MAGNESIUM SERPL-MCNC: 1.9 MG/DL (ref 1.9–2.7)
MCH RBC QN AUTO: 29.4 PG (ref 26.8–34.3)
MCHC RBC AUTO-ENTMCNC: 33.1 G/DL (ref 31.4–37.4)
MCV RBC AUTO: 89 FL (ref 82–98)
MONOCYTES # BLD AUTO: 0.46 THOUSAND/ÂΜL (ref 0.17–1.22)
MONOCYTES NFR BLD AUTO: 4 % (ref 4–12)
MUCOUS THREADS UR QL AUTO: ABNORMAL
NEUTROPHILS # BLD AUTO: 10.03 THOUSANDS/ÂΜL (ref 1.85–7.62)
NEUTS SEG NFR BLD AUTO: 91 % (ref 43–75)
NITRITE UR QL STRIP: NEGATIVE
NON-SQ EPI CELLS URNS QL MICRO: ABNORMAL /HPF
NRBC BLD AUTO-RTO: 0 /100 WBCS
PH UR STRIP.AUTO: 5 [PH]
PLATELET # BLD AUTO: 139 THOUSANDS/UL (ref 149–390)
PMV BLD AUTO: 9.8 FL (ref 8.9–12.7)
POTASSIUM SERPL-SCNC: 3.6 MMOL/L (ref 3.5–5.3)
PROCALCITONIN SERPL-MCNC: 9.43 NG/ML
PROT SERPL-MCNC: 5.2 G/DL (ref 6.4–8.4)
PROT UR STRIP-MCNC: NEGATIVE MG/DL
RBC # BLD AUTO: 4.46 MILLION/UL (ref 3.88–5.62)
RBC #/AREA URNS AUTO: ABNORMAL /HPF
RSV RNA NPH QL NAA+NON-PROBE: NOT DETECTED
RV+EV RNA NPH QL NAA+NON-PROBE: NOT DETECTED
S PNEUM AG UR QL: NEGATIVE
SARS-COV-2 RNA NPH QL NAA+NON-PROBE: NOT DETECTED
SODIUM SERPL-SCNC: 134 MMOL/L (ref 135–147)
SP GR UR STRIP.AUTO: 1.01 (ref 1–1.03)
UROBILINOGEN UR STRIP-ACNC: <2 MG/DL
WBC # BLD AUTO: 11.02 THOUSAND/UL (ref 4.31–10.16)
WBC #/AREA URNS AUTO: ABNORMAL /HPF

## 2024-12-26 PROCEDURE — 87449 NOS EACH ORGANISM AG IA: CPT | Performed by: INTERNAL MEDICINE

## 2024-12-26 PROCEDURE — 74177 CT ABD & PELVIS W/CONTRAST: CPT

## 2024-12-26 PROCEDURE — 71275 CT ANGIOGRAPHY CHEST: CPT

## 2024-12-26 PROCEDURE — 81001 URINALYSIS AUTO W/SCOPE: CPT | Performed by: PHYSICIAN ASSISTANT

## 2024-12-26 PROCEDURE — 84145 PROCALCITONIN (PCT): CPT | Performed by: PHYSICIAN ASSISTANT

## 2024-12-26 PROCEDURE — 92610 EVALUATE SWALLOWING FUNCTION: CPT

## 2024-12-26 PROCEDURE — 87086 URINE CULTURE/COLONY COUNT: CPT | Performed by: INTERNAL MEDICINE

## 2024-12-26 PROCEDURE — 0202U NFCT DS 22 TRGT SARS-COV-2: CPT | Performed by: INTERNAL MEDICINE

## 2024-12-26 PROCEDURE — 72141 MRI NECK SPINE W/O DYE: CPT

## 2024-12-26 PROCEDURE — 99223 1ST HOSP IP/OBS HIGH 75: CPT | Performed by: INTERNAL MEDICINE

## 2024-12-26 PROCEDURE — 83735 ASSAY OF MAGNESIUM: CPT | Performed by: PHYSICIAN ASSISTANT

## 2024-12-26 PROCEDURE — 85025 COMPLETE CBC W/AUTO DIFF WBC: CPT | Performed by: PHYSICIAN ASSISTANT

## 2024-12-26 PROCEDURE — 80053 COMPREHEN METABOLIC PANEL: CPT | Performed by: PHYSICIAN ASSISTANT

## 2024-12-26 RX ORDER — METHOTREXATE 2.5 MG/1
2.5 TABLET ORAL
Status: DISCONTINUED | OUTPATIENT
Start: 2024-12-30 | End: 2024-12-26

## 2024-12-26 RX ORDER — LANOLIN ALCOHOL/MO/W.PET/CERES
400 CREAM (GRAM) TOPICAL ONCE
Status: COMPLETED | OUTPATIENT
Start: 2024-12-26 | End: 2024-12-26

## 2024-12-26 RX ORDER — HYDROMORPHONE HCL/PF 1 MG/ML
0.5 SYRINGE (ML) INJECTION EVERY 4 HOURS PRN
Refills: 0 | Status: DISCONTINUED | OUTPATIENT
Start: 2024-12-26 | End: 2024-12-28 | Stop reason: HOSPADM

## 2024-12-26 RX ORDER — POTASSIUM CHLORIDE 1500 MG/1
40 TABLET, EXTENDED RELEASE ORAL ONCE
Status: COMPLETED | OUTPATIENT
Start: 2024-12-26 | End: 2024-12-26

## 2024-12-26 RX ORDER — SODIUM CHLORIDE, SODIUM GLUCONATE, SODIUM ACETATE, POTASSIUM CHLORIDE, MAGNESIUM CHLORIDE, SODIUM PHOSPHATE, DIBASIC, AND POTASSIUM PHOSPHATE .53; .5; .37; .037; .03; .012; .00082 G/100ML; G/100ML; G/100ML; G/100ML; G/100ML; G/100ML; G/100ML
75 INJECTION, SOLUTION INTRAVENOUS CONTINUOUS
Status: DISCONTINUED | OUTPATIENT
Start: 2024-12-26 | End: 2024-12-28 | Stop reason: HOSPADM

## 2024-12-26 RX ORDER — ENOXAPARIN SODIUM 100 MG/ML
40 INJECTION SUBCUTANEOUS DAILY
Status: DISCONTINUED | OUTPATIENT
Start: 2024-12-26 | End: 2024-12-27

## 2024-12-26 RX ORDER — CEFTRIAXONE 1 G/50ML
1000 INJECTION, SOLUTION INTRAVENOUS EVERY 24 HOURS
Status: DISCONTINUED | OUTPATIENT
Start: 2024-12-26 | End: 2024-12-26

## 2024-12-26 RX ORDER — ONDANSETRON 2 MG/ML
4 INJECTION INTRAMUSCULAR; INTRAVENOUS EVERY 6 HOURS PRN
Status: DISCONTINUED | OUTPATIENT
Start: 2024-12-26 | End: 2024-12-28 | Stop reason: HOSPADM

## 2024-12-26 RX ORDER — CEFTRIAXONE 2 G/50ML
2000 INJECTION, SOLUTION INTRAVENOUS EVERY 24 HOURS
Status: DISCONTINUED | OUTPATIENT
Start: 2024-12-26 | End: 2024-12-28 | Stop reason: HOSPADM

## 2024-12-26 RX ORDER — KETOROLAC TROMETHAMINE 30 MG/ML
30 INJECTION, SOLUTION INTRAMUSCULAR; INTRAVENOUS EVERY 6 HOURS SCHEDULED
Status: COMPLETED | OUTPATIENT
Start: 2024-12-26 | End: 2024-12-27

## 2024-12-26 RX ORDER — METHOCARBAMOL 500 MG/1
500 TABLET, FILM COATED ORAL EVERY 8 HOURS SCHEDULED
Status: DISCONTINUED | OUTPATIENT
Start: 2024-12-26 | End: 2024-12-28 | Stop reason: HOSPADM

## 2024-12-26 RX ORDER — PREDNISONE 1 MG/1
2.5 TABLET ORAL DAILY
Status: DISCONTINUED | OUTPATIENT
Start: 2024-12-26 | End: 2024-12-27

## 2024-12-26 RX ORDER — ACETAMINOPHEN 325 MG/1
650 TABLET ORAL EVERY 6 HOURS PRN
Status: DISCONTINUED | OUTPATIENT
Start: 2024-12-26 | End: 2024-12-28 | Stop reason: HOSPADM

## 2024-12-26 RX ADMIN — HYDROMORPHONE HYDROCHLORIDE 0.5 MG: 1 INJECTION, SOLUTION INTRAMUSCULAR; INTRAVENOUS; SUBCUTANEOUS at 05:01

## 2024-12-26 RX ADMIN — KETOROLAC TROMETHAMINE 30 MG: 30 INJECTION, SOLUTION INTRAMUSCULAR; INTRAVENOUS at 01:11

## 2024-12-26 RX ADMIN — KETOROLAC TROMETHAMINE 30 MG: 30 INJECTION, SOLUTION INTRAMUSCULAR; INTRAVENOUS at 05:01

## 2024-12-26 RX ADMIN — PANCRELIPASE 24000 UNITS: 120000; 24000; 76000 CAPSULE, DELAYED RELEASE PELLETS ORAL at 11:23

## 2024-12-26 RX ADMIN — KETOROLAC TROMETHAMINE 30 MG: 30 INJECTION, SOLUTION INTRAMUSCULAR; INTRAVENOUS at 17:31

## 2024-12-26 RX ADMIN — METHOCARBAMOL TABLETS 500 MG: 500 TABLET, COATED ORAL at 05:00

## 2024-12-26 RX ADMIN — SODIUM CHLORIDE, SODIUM GLUCONATE, SODIUM ACETATE, POTASSIUM CHLORIDE, MAGNESIUM CHLORIDE, SODIUM PHOSPHATE, DIBASIC, AND POTASSIUM PHOSPHATE 75 ML/HR: .53; .5; .37; .037; .03; .012; .00082 INJECTION, SOLUTION INTRAVENOUS at 21:47

## 2024-12-26 RX ADMIN — ENOXAPARIN SODIUM 40 MG: 40 INJECTION SUBCUTANEOUS at 08:18

## 2024-12-26 RX ADMIN — Medication 400 MG: at 08:18

## 2024-12-26 RX ADMIN — POTASSIUM CHLORIDE 40 MEQ: 1500 TABLET, EXTENDED RELEASE ORAL at 08:18

## 2024-12-26 RX ADMIN — SODIUM CHLORIDE 1000 ML: 0.9 INJECTION, SOLUTION INTRAVENOUS at 00:08

## 2024-12-26 RX ADMIN — CEFTRIAXONE 2000 MG: 2 INJECTION, SOLUTION INTRAVENOUS at 23:00

## 2024-12-26 RX ADMIN — IOHEXOL 100 ML: 350 INJECTION, SOLUTION INTRAVENOUS at 08:32

## 2024-12-26 RX ADMIN — PANCRELIPASE 24000 UNITS: 120000; 24000; 76000 CAPSULE, DELAYED RELEASE PELLETS ORAL at 17:31

## 2024-12-26 RX ADMIN — METHOCARBAMOL TABLETS 500 MG: 500 TABLET, COATED ORAL at 14:23

## 2024-12-26 RX ADMIN — PREDNISONE 2.5 MG: 1 TABLET ORAL at 08:18

## 2024-12-26 RX ADMIN — PANCRELIPASE 24000 UNITS: 120000; 24000; 76000 CAPSULE, DELAYED RELEASE PELLETS ORAL at 08:18

## 2024-12-26 RX ADMIN — KETOROLAC TROMETHAMINE 30 MG: 30 INJECTION, SOLUTION INTRAMUSCULAR; INTRAVENOUS at 23:00

## 2024-12-26 RX ADMIN — METHOCARBAMOL TABLETS 500 MG: 500 TABLET, COATED ORAL at 21:41

## 2024-12-26 RX ADMIN — SODIUM CHLORIDE, SODIUM GLUCONATE, SODIUM ACETATE, POTASSIUM CHLORIDE, MAGNESIUM CHLORIDE, SODIUM PHOSPHATE, DIBASIC, AND POTASSIUM PHOSPHATE 75 ML/HR: .53; .5; .37; .037; .03; .012; .00082 INJECTION, SOLUTION INTRAVENOUS at 09:25

## 2024-12-26 RX ADMIN — KETOROLAC TROMETHAMINE 30 MG: 30 INJECTION, SOLUTION INTRAMUSCULAR; INTRAVENOUS at 11:22

## 2024-12-26 RX ADMIN — METHOCARBAMOL TABLETS 500 MG: 500 TABLET, COATED ORAL at 01:11

## 2024-12-26 NOTE — INCIDENTAL FINDINGS
The following findings require follow up:  Radiographic finding   Finding:   CTA of the chest (PE protocol) and CT scan of the abdomen/pelvis with IV contrast (12/26/2024):  Mild patchy opacity seen right lower lobe may be atelectasis/pneumonitis and follow-up chest CT at 3 months to demonstrate resolution     Few scattered less than 6 mm nodules left lung. Based on current Fleischner Society 2017 Guidelines on incidental pulmonary nodule,  follow-up CT at 12 months can be considered.   Follow up required: Yes   Follow up should be done within 1 week(s)    Please notify the following clinician to assist with the follow up:   Caden Salmeron (PCP)    Incidental finding results were discussed with the Patient by Law Bliss DO on 12/26/24.   They expressed understanding and all questions answered.

## 2024-12-26 NOTE — INCIDENTAL FINDINGS
The following findings require follow up:  Radiographic finding   Finding:   CTA of the chest (PE protocol) and CT scan of the abdomen/pelvis with IV contrast (12/26/2024):  Incidentally detected multiple pancreatic cysts with largest cystic pancreatic lesion measuring about 2 cm, can be assessed with nonemergent MRI along with MRCP when the acute illness subsides.    Follow up required: Yes   Follow up should be done within 1 week(s)    Please notify the following clinician to assist with the follow up:   Caden Salmeron (PCP)    Incidental finding results were discussed with the Patient by Law Bliss DO on 12/26/24.   They expressed understanding and all questions answered.

## 2024-12-26 NOTE — SPEECH THERAPY NOTE
"Speech Language/Pathology  Speech-Language Pathology Bedside Swallow Evaluation    Patient Name: Deric Salamanca  Today's Date: 12/26/2024     Problem List  Principal Problem:    Sepsis (HCC)  Active Problems:    Pancreatic abnormality    Rheumatoid arthritis involving multiple sites with positive rheumatoid factor (HCC)    Cervical radiculopathy    Thrombocytopenia (HCC)    Cervical spinal stenosis    Past Medical History  Past Medical History:   Diagnosis Date    Arthritis     Pancreatitis     RA (rheumatoid arthritis) (HCC)     Rheumatoid arthritis      Past Surgical History  Past Surgical History:   Procedure Laterality Date    CHOLECYSTECTOMY      FINGER SURGERY Right     fourth digit     Summary   Pt presented with functional appearing oral and pharyngeal stage swallowing skills with materials administered today. Patient reports no hx or current swallow difficulties. ST eval only, no further ST services warranted at this time.    Risk/s for Aspiration: low     Recommended Diet: regular diet and thin liquids   Recommended Form of Meds: whole with liquid   Aspiration precautions and swallowing strategies: upright posture  Other Recommendations: Continue frequent oral care    Current Medical Status  Per 12/26/24 H&P - Pt is a 59 y.o. male with a PMH of RA who presents with acute on chronic neck pain. Patient reports that yesterday he moved the wrong way and heard a \"pop\" in his neck with subsequent severe radicular pain down both shoulders and arms. He notes that the next day, he developed chills. He was recently treated with a steroid taper for a suspected rheumatoid flare but notes he continues to have joint pains the same as before the taper and does not feel they improved. Denies rhinorrhea, congestion, nausea, vomiting, abdominal pain, cough, chest pain, diarrhea, constipation, urinary changes, wounds. CXR in the ED unremarkable. Procalcitonin is elevated at 3.4. Given a dose of empiric cefepime in the ED for a " fever of 101. Meeting sepsis criteria with tachycardia and tachypnea. Being admitted for further work uip.     Current Precautions:  Contact  Airborne     Allergies:  No known food allergies    Special Studies:  12/25/24 Chest XR impression - No acute cardiopulmonary disease.     12/26/24 CT pe study w abdomen pelvis impression - This is a limited study due to suboptimal enhancement of the pulmonary arteries. There is no large occlusive pulmonary embolism in the main pulmonary artery and its central branches if concern for pulmonary embolism persists correlation with the leg   ultrasound, D-dimer values.  Study may be repeated as clinically warranted     Mild patchy opacity seen right lower lobe may be atelectasis/pneumonitis and follow-up chest CT at 3 months to demonstrate resolution     Few scattered less than 6 mm nodules left lung. Based on current Fleischner Society 2017 Guidelines on incidental pulmonary nodule,  follow-up CT at 12 months can be considered.     No intra-abdominal fluid collection seen.     Incidentally detected multiple pancreatic cysts with largest cystic pancreatic lesion measuring about 2 cm, can be assessed with nonemergent MRI along with MRCP when the acute illness subsides    Social/Education/Vocational Hx:  Pt lives with family    Swallow Information   Current Risks for Dysphagia & Aspiration:  possible RLL pneumonitis   Current Symptoms/Concerns: change in respiratory status  Current Diet: regular diet and thin liquids   Baseline Diet: regular diet and thin liquids    Baseline Assessment   Behavior/Cognition: alert and oriented x3  Speech/Language Status: able to participate in conversation and able to follow commands  Patient Positioning: upright in bed  Pain Status/Interventions/Response to Interventions:  No report of or nonverbal indications of pain.    Swallow Mechanism Exam  Facial: symmetrical  Labial: WFL  Lingual: WFL  Velum: symmetrical  Mandible: adequate ROM  Dentition:  adequate  Vocal quality:clear/adequate   Volitional Cough: strong/productive   Respiratory Status: on RA    Tracheostomy: n/a    Consistencies Assessed and Performance   Consistencies Administered: thin liquids, puree, and hard solids  Materials administered included: water via straw cup, applesauce, and kina cracker    Oral Stage: WFL  Mastication was adequate with the materials administered today.  Bolus formation and transfer were functional with no significant oral residue noted.  No overt s/s reduced oral control.    Pharyngeal Stage: WFL  Swallow Mechanics:  Swallowing initiation appeared prompt.  Laryngeal rise was palpated and judged to be within functional limits.  No coughing, throat clearing, change in vocal quality or respiratory status noted today.     Esophageal Concerns: none reported    Strategies and Efficacy: none necessary    Summary and Recommendations (see above)    Results Reviewed with: patient and RN    Treatment Recommended: not at this time

## 2024-12-26 NOTE — PHYSICAL THERAPY NOTE
Physical Therapy Screen    Patient Name: Deric Salamanca    Today's Date: 12/26/2024     Problem List  Principal Problem:    Sepsis (HCC)  Active Problems:    Pancreatic abnormality    Rheumatoid arthritis involving multiple sites with positive rheumatoid factor (HCC)    Cervical radiculopathy    Thrombocytopenia (HCC)    Cervical spinal stenosis       Past Medical History  Past Medical History:   Diagnosis Date    Arthritis     Pancreatitis     RA (rheumatoid arthritis) (HCC)     Rheumatoid arthritis       12/26/24 1314   Note Type   Note type Screen     PT orders received, chart review completed. Spoke with pt's PCAKendall who states that the pt is independent with ambulation in his room. Spoke with pt who reports PTA was independent with ADLs, IADLs, mobility without AD, and drives. Pt reports no issues with mobility or balance and has no concerns regarding mobility or self care upon return home. Pt demonstrated gait for therapist as pt with steady gait and Good ambulatory balance. Pt reports concerns regarding BUE strength; in light of cervical radiculopathy, recommend follow-up with OPPT. Will D/C PT effective this date. Please reconsult if new needs arise.    Kalani Vigil, PT, DPT

## 2024-12-26 NOTE — PLAN OF CARE
Problem: PAIN - ADULT  Goal: Verbalizes/displays adequate comfort level or baseline comfort level  Description: Interventions:  - Encourage patient to monitor pain and request assistance  - Assess pain using appropriate pain scale  - Administer analgesics based on type and severity of pain and evaluate response  - Implement non-pharmacological measures as appropriate and evaluate response  - Consider cultural and social influences on pain and pain management  - Notify physician/advanced practitioner if interventions unsuccessful or patient reports new pain  Outcome: Progressing     Problem: INFECTION - ADULT  Goal: Absence or prevention of progression during hospitalization  Description: INTERVENTIONS:  - Assess and monitor for signs and symptoms of infection  - Monitor lab/diagnostic results  - Monitor all insertion sites, i.e. indwelling lines, tubes, and drains  - Monitor endotracheal if appropriate and nasal secretions for changes in amount and color  - Nash appropriate cooling/warming therapies per order  - Administer medications as ordered  - Instruct and encourage patient and family to use good hand hygiene technique  - Identify and instruct in appropriate isolation precautions for identified infection/condition  Outcome: Progressing     Problem: SAFETY ADULT  Goal: Patient will remain free of falls  Description: INTERVENTIONS:  - Educate patient/family on patient safety including physical limitations  - Instruct patient to call for assistance with activity   - Consult OT/PT to assist with strengthening/mobility   - Keep Call bell within reach  - Keep bed low and locked with side rails adjusted as appropriate  - Keep care items and personal belongings within reach  - Initiate and maintain comfort rounds  - Make Fall Risk Sign visible to staff  - Offer Toileting every 2 Hours, in advance of need  - Initiate/Maintain bed/chair alarm  - Obtain necessary fall risk management equipment: nonskid footwear  -  Apply yellow socks and bracelet for high fall risk patients  - Consider moving patient to room near nurses station  Outcome: Progressing  Goal: Maintain or return to baseline ADL function  Description: INTERVENTIONS:  -  Assess patient's ability to carry out ADLs; assess patient's baseline for ADL function and identify physical deficits which impact ability to perform ADLs (bathing, care of mouth/teeth, toileting, grooming, dressing, etc.)  - Assess/evaluate cause of self-care deficits   - Assess range of motion  - Assess patient's mobility; develop plan if impaired  - Assess patient's need for assistive devices and provide as appropriate  - Encourage maximum independence but intervene and supervise when necessary  - Involve family in performance of ADLs  - Assess for home care needs following discharge   - Consider OT consult to assist with ADL evaluation and planning for discharge  - Provide patient education as appropriate  Outcome: Progressing

## 2024-12-26 NOTE — ASSESSMENT & PLAN NOTE
"Acute on chronic cervical radiculopathy  Reports he heard a \"pop\" yesterday and now pain is uncontrolled  Requiring significant pain regimen at present  Obtain MRI c spine with history of significant disc disease on prior imaging. Suspect he has a herniated disc contributing to symptomatology  Did receive decadron in the ED, hold off on further steroids until pathology is confirmed  Utilize multimodal pain regimen  "

## 2024-12-26 NOTE — ED PROVIDER NOTES
Time reflects when diagnosis was documented in both MDM as applicable and the Disposition within this note       Time User Action Codes Description Comment    12/26/2024 12:09 AM Beau Garcia [R65.10] SIRS (systemic inflammatory response syndrome) (HCC)     12/26/2024 12:10 AM Beau Garcia [R50.9] Fever and chills     12/26/2024 12:10 AM Beau Garcia [M54.9,  G89.29] Acute on chronic back pain     12/26/2024 12:10 AM Beau Garcia [M79.10] Myalgia           ED Disposition       ED Disposition   Admit    Condition   Stable    Date/Time   u Dec 26, 2024 12:09 AM    Comment   Case was discussed with JANINE and the patient's admission status was agreed to be Admission Status: inpatient status to the service of Dr. Holt .               Assessment & Plan       Medical Decision Making  59-year-old male with a history of chronic cervical and lumbar region pain/radiculopathy presenting for acute on chronic pain, myalgias found to be febrile.  Physical exam does not reveal any clear evidence of bacterial infection will check chest x-ray to evaluate for pneumonia, urinalysis to evaluate for UTI, strep swab, viral swabs for flu, COVID, basic labs provide antipyretics pain management IV fluids.  Could be other viral syndrome.  Could be bacteremia.    Plan for admission due to ongoing symptoms lack of clear explanation for dysuria and ongoing pain management needs.    Problems Addressed:  Acute on chronic back pain: acute illness or injury  Fever and chills: acute illness or injury  Myalgia: acute illness or injury  SIRS (systemic inflammatory response syndrome) (HCC): acute illness or injury with systemic symptoms    Amount and/or Complexity of Data Reviewed  Labs: ordered. Decision-making details documented in ED Course.  Radiology: ordered and independent interpretation performed.    Risk  OTC drugs.  Prescription drug management.  Parenteral controlled substances.  Decision  regarding hospitalization.        ED Course as of 12/26/24 0012   Wed Dec 25, 2024   2326 Platelet Count(!): 147  Mild thrombocytopenia, new when compared to prior labs from 1 year ago.   2326 WBC(!): 10.88  New leukocytosis but does not meet threshold for a SIRS criteria   2343 Procalcitonin(!): 3.95       Medications   sodium chloride 0.9 % bolus 1,000 mL (1,000 mL Intravenous New Bag 12/26/24 0008)   ibuprofen (MOTRIN) tablet 800 mg (800 mg Oral Given 12/25/24 2144)   diazepam (VALIUM) injection 5 mg (5 mg Intravenous Given 12/25/24 2140)   dexamethasone (DECADRON) tablet 10 mg (10 mg Oral Given 12/25/24 2144)   HYDROmorphone (DILAUDID) injection 1 mg (1 mg Intravenous Given 12/25/24 2140)   acetaminophen (TYLENOL) tablet 975 mg (975 mg Oral Given 12/25/24 2221)   sodium chloride 0.9 % bolus 1,000 mL (0 mL Intravenous Stopped 12/25/24 2358)   ondansetron (ZOFRAN) injection 4 mg (4 mg Intravenous Given 12/25/24 2312)   fentaNYL injection 50 mcg (50 mcg Intravenous Given 12/25/24 2315)   cefepime (MAXIPIME) IVPB (premix in dextrose) 2,000 mg 50 mL (0 mg Intravenous Stopped 12/25/24 2358)       ED Risk Strat Scores                          SBIRT 22yo+      Flowsheet Row Most Recent Value   Initial Alcohol Screen: US AUDIT-C     1. How often do you have a drink containing alcohol? 0 Filed at: 12/25/2024 2128   2. How many drinks containing alcohol do you have on a typical day you are drinking?  0 Filed at: 12/25/2024 2128   3a. Male UNDER 65: How often do you have five or more drinks on one occasion? 0 Filed at: 12/25/2024 2128   3b. FEMALE Any Age, or MALE 65+: How often do you have 4 or more drinks on one occassion? 0 Filed at: 12/25/2024 2128   Audit-C Score 0 Filed at: 12/25/2024 2128   GLADYS: How many times in the past year have you...    Used an illegal drug or used a prescription medication for non-medical reasons? Never Filed at: 12/25/2024 2128                            History of Present Illness       Chief  Complaint   Patient presents with    Medical Problem     Patient states he felt a pop on the R side of his neck last night around 9/10pm. Pt c/o pain in b/l wrists, elbows, and neck. +generalized weakness        Past Medical History:   Diagnosis Date    Arthritis     Pancreatitis     RA (rheumatoid arthritis) (HCC)     Rheumatoid arthritis       Past Surgical History:   Procedure Laterality Date    CHOLECYSTECTOMY      FINGER SURGERY Right     fourth digit      Family History   Problem Relation Age of Onset    Cancer Mother     Cancer Father       Social History     Tobacco Use    Smoking status: Never    Smokeless tobacco: Never   Vaping Use    Vaping status: Never Used   Substance Use Topics    Alcohol use: Not Currently     Alcohol/week: 2.0 standard drinks of alcohol     Types: 2 Cans of beer per week    Drug use: Never      E-Cigarette/Vaping    E-Cigarette Use Never User       E-Cigarette/Vaping Substances      I have reviewed and agree with the history as documented.     Is a 59-year-old male who has chronic cervical and lumbar disc disease with radiculopathy and resultant chronic pain who presents here for evaluation of worsening pain affecting neck shoulders arms and to a lesser extent low back.  Patient states symptoms began gradually today.  Patient found to be febrile in the emergency department.  Denies falls or injuries denies cough congestion denies rashes denies vomiting denies sick contacts.  Denies any pain in new locations.  Feels similar to chronic pain but exacerbated.  Denies any new medications or other acute changes in his health prior to onset of symptoms.      Medical Problem  Associated symptoms: diarrhea and myalgias    Associated symptoms: no abdominal pain, no chest pain, no congestion, no cough, no ear pain, no fatigue, no fever, no headaches, no nausea, no rash, no shortness of breath, no sore throat and no vomiting        Review of Systems   Constitutional:  Positive for activity  change. Negative for appetite change, chills, diaphoresis, fatigue and fever.   HENT:  Negative for congestion, ear pain, facial swelling, sore throat, trouble swallowing and voice change.    Eyes:  Negative for pain, redness and visual disturbance.   Respiratory:  Negative for cough and shortness of breath.    Cardiovascular:  Negative for chest pain and palpitations.   Gastrointestinal:  Positive for diarrhea. Negative for abdominal pain, nausea and vomiting.   Genitourinary:  Negative for dysuria, flank pain and hematuria.   Musculoskeletal:  Positive for arthralgias, back pain, myalgias and neck pain. Negative for joint swelling.   Skin:  Negative for color change and rash.   Neurological:  Negative for dizziness, seizures, syncope, light-headedness and headaches.   Psychiatric/Behavioral:  Negative for confusion.    All other systems reviewed and are negative.          Objective       ED Triage Vitals   Temperature Pulse Blood Pressure Respirations SpO2 Patient Position - Orthostatic VS   12/25/24 2130 12/25/24 2127 12/25/24 2127 12/25/24 2127 12/25/24 2127 12/25/24 2127   100.4 °F (38 °C) (!) 116 140/64 20 96 % Lying      Temp Source Heart Rate Source BP Location FiO2 (%) Pain Score    12/25/24 2130 12/25/24 2127 12/25/24 2127 -- 12/25/24 2127    Oral Monitor Left arm  10 - Worst Possible Pain      Vitals      Date and Time Temp Pulse SpO2 Resp BP Pain Score FACES Pain Rating User   12/25/24 2358 99.4 °F (37.4 °C) 107 96 % 21 -- -- --    12/25/24 2315 -- -- -- -- -- 9 --    12/25/24 2300 -- 113 96 % 27 112/57 -- --    12/25/24 2239 -- 118 94 % 24 -- -- --    12/25/24 2230 -- 120 -- 27 130/59 -- --    12/25/24 2221 -- -- -- -- -- Med Not Given for Pain - for MAR use only --    12/25/24 2200 -- 119 93 % 22 132/74 -- --    12/25/24 2149 101.7 °F (38.7 °C) 119 93 % 24 -- -- -- CS   12/25/24 2144 -- -- -- -- -- 10 - Worst Possible Pain -- CS   12/25/24 2140 -- -- -- -- -- 10 - Worst Possible Pain --     12/25/24 2130 100.4 °F (38 °C) 123 94 % 32 137/65 -- -- CS   12/25/24 2127 -- 116 96 % 20 140/64 10 - Worst Possible Pain -- CS            Physical Exam  Vitals and nursing note reviewed. Exam conducted with a chaperone present.   Constitutional:       General: He is in acute distress.      Appearance: He is well-developed. He is ill-appearing. He is not toxic-appearing or diaphoretic.   HENT:      Head: Normocephalic and atraumatic.      Right Ear: External ear normal.      Left Ear: External ear normal.      Nose: Nose normal.      Mouth/Throat:      Mouth: Mucous membranes are moist.      Pharynx: Oropharynx is clear. No oropharyngeal exudate or posterior oropharyngeal erythema.   Eyes:      Extraocular Movements: Extraocular movements intact.      Conjunctiva/sclera: Conjunctivae normal.      Pupils: Pupils are equal, round, and reactive to light.   Cardiovascular:      Rate and Rhythm: Regular rhythm. Tachycardia present.      Pulses: Normal pulses.      Heart sounds: Normal heart sounds. No murmur heard.  Pulmonary:      Effort: Pulmonary effort is normal. No respiratory distress.      Breath sounds: Normal breath sounds. No stridor. No wheezing, rhonchi or rales.   Abdominal:      General: Abdomen is flat. Bowel sounds are normal. There is no distension.      Palpations: Abdomen is soft.      Tenderness: There is no abdominal tenderness. There is no guarding or rebound.   Musculoskeletal:         General: No swelling.      Cervical back: Neck supple.      Right lower leg: No edema.      Left lower leg: No edema.      Comments: No joint swelling or erythema     Skin:     General: Skin is warm and dry.      Capillary Refill: Capillary refill takes less than 2 seconds.      Coloration: Skin is not jaundiced.      Findings: No erythema or rash.   Neurological:      General: No focal deficit present.      Mental Status: He is alert and oriented to person, place, and time. Mental status is at baseline.       GCS: GCS eye subscore is 4. GCS verbal subscore is 5. GCS motor subscore is 6.      Cranial Nerves: No dysarthria or facial asymmetry.      Sensory: Sensation is intact.      Motor: Tremor present. No weakness or abnormal muscle tone.   Psychiatric:         Mood and Affect: Mood normal.         Results Reviewed       Procedure Component Value Units Date/Time    Blood culture #2 [[id number]] Collected: 12/25/24 2321    Lab Status: In process Specimen: Blood from Arm, Left Updated: 12/26/24 0004    Blood culture #1 [[id number]] Collected: 12/25/24 2307    Lab Status: In process Specimen: Blood from Line, Venous Updated: 12/26/24 0004    Sedimentation rate, automated [[id number]]  (Normal) Collected: 12/25/24 2307    Lab Status: Final result Specimen: Blood from Line, Venous Updated: 12/25/24 2356     Sed Rate 12 mm/hour     Lipase [[id number]]  (Abnormal) Collected: 12/25/24 2307    Lab Status: Final result Specimen: Blood from Line, Venous Updated: 12/25/24 2350     Lipase <6 u/L     Comprehensive metabolic panel [[id number]]  (Abnormal) Collected: 12/25/24 2307    Lab Status: Final result Specimen: Blood from Line, Venous Updated: 12/25/24 2350     Sodium 133 mmol/L      Potassium 3.2 mmol/L      Chloride 105 mmol/L      CO2 20 mmol/L      ANION GAP 8 mmol/L      BUN 17 mg/dL      Creatinine 1.07 mg/dL      Glucose 121 mg/dL      Calcium 8.2 mg/dL      Corrected Calcium 8.8 mg/dL      AST 16 U/L      ALT 20 U/L      Alkaline Phosphatase 74 U/L      Total Protein 5.5 g/dL      Albumin 3.3 g/dL      Total Bilirubin 2.02 mg/dL      eGFR 76 ml/min/1.73sq m     Narrative:      National Kid[employer] guidelines for Chronic Kidney Disease (CKD):     Stage 1 with normal or high GFR (GFR > 90 mL/min/1.73 square meters)    Stage 2 Mild CKD (GFR = 60-89 mL/min/1.73 square meters)    Stage 3A Moderate CKD (GFR = 45-59 mL/min/1.73 square meters)    Stage 3B Moderate CKD (GFR = 30-44 mL/min/1.73 square meters)    Stage 4  Severe CKD (GFR = 15-29 mL/min/1.73 square meters)    Stage 5 End Stage CKD (GFR <15 mL/min/1.73 square meters)  Note: GFR calculation is accurate only with a steady state creatinine    C-reactive protein [[id number]]  (Abnormal) Collected: 12/25/24 2307    Lab Status: Final result Specimen: Blood from Line, Venous Updated: 12/25/24 2350     CRP 4.8 mg/L     TSH, 3rd generation with Free T4 reflex [[id number]]  (Normal) Collected: 12/25/24 2307    Lab Status: Final result Specimen: Blood from Line, Venous Updated: 12/25/24 2348     TSH 3RD GENERATON 1.936 uIU/mL     CK [[id number]]  (Normal) Collected: 12/25/24 2307    Lab Status: Final result Specimen: Blood from Line, Venous Updated: 12/25/24 2346     Total CK 61 U/L     Lactic acid, plasma (w/reflex if result > 2.0) [[id number]]  (Normal) Collected: 12/25/24 2307    Lab Status: Final result Specimen: Blood from Line, Venous Updated: 12/25/24 2346     LACTIC ACID 1.2 mmol/L     Narrative:      Result may be elevated if tourniquet was used during collection.    Procalcitonin [[id number]]  (Abnormal) Collected: 12/25/24 2307    Lab Status: Final result Specimen: Blood from Line, Venous Updated: 12/25/24 2341     Procalcitonin 3.95 ng/ml     CBC and differential [[id number]]  (Abnormal) Collected: 12/25/24 2307    Lab Status: Final result Specimen: Blood from Line, Venous Updated: 12/25/24 2314     WBC 10.88 Thousand/uL      RBC 4.47 Million/uL      Hemoglobin 13.4 g/dL      Hematocrit 39.8 %      MCV 89 fL      MCH 30.0 pg      MCHC 33.7 g/dL      RDW 13.9 %      MPV 9.2 fL      Platelets 147 Thousands/uL      nRBC 0 /100 WBCs      Segmented % 89 %      Immature Grans % 1 %      Lymphocytes % 5 %      Monocytes % 5 %      Eosinophils Relative 0 %      Basophils Relative 0 %      Absolute Neutrophils 9.66 Thousands/µL      Absolute Immature Grans 0.06 Thousand/uL      Absolute Lymphocytes 0.54 Thousands/µL      Absolute Monocytes 0.59 Thousand/µL      Eosinophils Absolute  0.02 Thousand/µL      Basophils Absolute 0.01 Thousands/µL     UA w Reflex to Microscopic w Reflex to Culture [[id number]]     Lab Status: No result Specimen: Urine, Clean Catch     Strep A PCR [[id number]]  (Normal) Collected: 12/25/24 2219    Lab Status: Final result Specimen: Throat Updated: 12/25/24 2251     STREP A PCR Not Detected    FLU/COVID Rapid Antigen (30 min. TAT) - Preferred screening test in ED [[id number]]  (Normal) Collected: 12/25/24 2219    Lab Status: Final result Specimen: Nares from Nose Updated: 12/25/24 2244     SARS COV Rapid Antigen Negative     Influenza A Rapid Antigen Negative     Influenza B Rapid Antigen Negative    Narrative:      This test has been performed using the [employer] Radha 2 FLU+SARS Antigen test under the Emergency Use Authorization (EUA). This test has been validated by the  and verified by the performing laboratory. The Radha uses lateral flow immunofluorescent sandwich assay to detect SARS-COV, Influenza A and Influenza B Antigen.     The [employer] Radha 2 SARS Antigen test does not differentiate between SARS-CoV and SARS-CoV-2.     Negative results are presumptive and may be confirmed with a molecular assay, if necessary, for patient management. Negative results do not rule out SARS-CoV-2 or influenza infection and should not be used as the sole basis for treatment or patient management decisions. A negative test result may occur if the level of antigen in a sample is below the limit of detection of this test.     Positive results are indicative of the presence of viral antigens, but do not rule out bacterial infection or co-infection with other viruses.     All test results should be used as an adjunct to clinical observations and other information available to the provider.    FOR PEDIATRIC PATIENTS - copy/paste COVID Guidelines URL to browser: [email or web address]            XR chest 1 view portable   ED  Interpretation by Beau Garcia DO (12/25 [id number])   1 view x-ray of the chest interpreted by myself pending official radiology report.  When compared to prior chest x-ray on file no acute cardiopulmonary process seen.          Procedures    ED Medication and Procedure Management   Prior to Admission Medications   Prescriptions Last Dose Informant Patient Reported? Taking?   Amylase-Lipase-Protease (PANCRELIPASE 07043 PO)  Self Yes No   Sig: Take by mouth   Certolizumab Pegol (CIMZIA PREFILLED SC)  Self Yes No   Sig: Inject under the skin   DULoxetine (CYMBALTA) 60 mg delayed release capsule  Self Yes No   Sig: Take 60 mg by mouth daily   Patient not taking: Reported on 9/18/2024   Zenpep [id number] units CPEP  Self Yes No   amoxicillin-clavulanate (AUGMENTIN) 875-125 mg per tablet  Self Yes No   gabapentin (Neurontin) 300 mg capsule   No No   Sig: Take 1 capsule (300 mg total) by mouth 3 (three) times a day For post-herpetic neuralgia: Take 1 tablet on day 1,  Then take 2 tablets on day 2, Then take 3 tablets on day 3 and every day after that as instructed by your doctor.   ketorolac (TORADOL) 10 mg tablet  Self No No   Sig: Take 1 tablet (10 mg total) by mouth every 6 (six) hours as needed for moderate pain   Patient not taking: Reported on 9/18/2024   leflunomide (ARAVA) 10 MG tablet  Self Yes No   Sig: Take 10 mg by mouth 2 (two) times a day   Patient not taking: Reported on 9/18/2024   leflunomide (ARAVA) 20 MG tablet  Self Yes No   Sig: Take 20 mg by mouth daily   Patient not taking: Reported on 9/18/2024   methocarbamol (ROBAXIN) 500 mg tablet  Self No No   Sig: Take 1 tablet (500 mg total) by mouth 4 (four) times a day   Patient not taking: Reported on 9/18/2024   methotrexate 2.5 MG tablet   Yes No   Sig: Take 2.5 mg by mouth every 12 hours for 3 doses only each week 6 tablets once a week   methylPREDNISolone 4 MG tablet therapy pack   No No   Sig: Use as directed on package   pantoprazole (PROTONIX)  40 mg tablet  Self Yes No   Sig: Take 40 mg by mouth daily   Patient not taking: Reported on 9/18/2024   predniSONE 10 mg tablet   No No   Sig: 3 tabs once daily x 3 days; 2 tabs once daily x 2 days; 1 tab daily x 2 days   predniSONE 5 mg tablet   No No   Sig: One half tablet daily.      Facility-Administered Medications: None     Patient's Medications   Discharge Prescriptions    No medications on file     No discharge procedures on file.  ED SEPSIS DOCUMENTATION   Time reflects when diagnosis was documented in both MDM as applicable and the Disposition within this note       Time User Action Codes Description Comment    12/26/2024 12:09 AM Beau Garcia [R65.10] SIRS (systemic inflammatory response syndrome) (HCC)     12/26/2024 12:10 AM Beau Garcia [R50.9] Fever and chills     12/26/2024 12:10 AM Beau Garcia [M54.9,  G89.29] Acute on chronic back pain     12/26/2024 12:10 AM Beau Garcia [M79.10] Myalgia                  Beau Garcia DO  12/26/24 0012

## 2024-12-26 NOTE — ASSESSMENT & PLAN NOTE
Recently completed a steroid taper for flare but reports no relief in symptoms  Hold methotrexate with acute infection suspected  Continue on prednisone 2.5 mg daily   none

## 2024-12-26 NOTE — CASE MANAGEMENT
Case Management Progress Note    Patient name Deric Salamanca  Location /402-01 MRN [id number]  : 1965 Date 2024       LOS (days): 0  Geometric Mean LOS (GMLOS) (days):   Days to GMLOS:        OBJECTIVE:        Current admission status: Inpatient  Preferred Pharmacy:   [facility] - [address]  [address] [place] [address]  Mail Order pharmacy  [facility] [address]  Phone: [phone] Fax: [phone]    [facility] [address] [address] [facility]  [address] [facility] [address]  Phone: [phone] Fax: [phone]    Primary Care Provider: Caden Salmeron PA-C    Primary Insurance: [facility]  Secondary Insurance:     PROGRESS NOTE:    Chart review completed.    No cm needs at this time.  CM to follow for and discharge needs.

## 2024-12-26 NOTE — H&P
"H&P - Hospitalist   Name: Deric Salamanca 59 y.o. male I MRN: [id number]  Unit/Bed#: 402-01 I Date of Admission: 12/25/2024   Date of Service: 12/26/2024 I Hospital Day: 0     Assessment & Plan  Sepsis (HCC)  Present on admission with fever, tachycardia, tachypnea  Mild leukocytosis present as well  Procalcitonin elevated at 3.4, continue to trend  At this time, unclear what is the cause of his sepsis as his only symptoms are generalized myalgias worse in his neck and chills  Possible that this fever is related to a rheumatoid flare  CXR unremarkable  Obtained blood cultures x 2  Check a UA  Received cefepime in the ED, continue with empiric ceftriaxone for now  Hold methotrexate for now, resume when able  Consider ID consult if fevers persist  Pancreatic abnormality  Continue on pancreatic enzymes  Rheumatoid arthritis involving multiple sites with positive rheumatoid factor (HCC)  Recently completed a steroid taper for flare but reports no relief in symptoms  Hold methotrexate with acute infection suspected  Continue on prednisone 2.5 mg daily  Cervical radiculopathy  Acute on chronic cervical radiculopathy  Reports he heard a \"pop\" yesterday and now pain is uncontrolled  Requiring significant pain regimen at present  Obtain MRI c spine with history of significant disc disease on prior imaging. Suspect he has a herniated disc contributing to symptomatology  Did receive decadron in the ED, hold off on further steroids until pathology is confirmed  Utilize multimodal pain regimen      VTE Pharmacologic Prophylaxis:   Moderate Risk (Score 3-4) - Pharmacological DVT Prophylaxis Ordered: enoxaparin (Lovenox).  Code Status: Level 1 - Full Code   Discussion with family: Updated  (wife) at bedside.    Anticipated Length of Stay: Patient will be admitted on an inpatient basis with an anticipated length of stay of greater than 2 midnights secondary to fever.    History of Present Illness   Chief Complaint: " "intractable back pain    Deric Salamanca is a 59 y.o. male with a PMH of RA who presents with acute on chronic neck pain. Patient reports that yesterday he moved the wrong way and heard a \"pop\" in his neck with subsequent severe radicular pain down both shoulders and arms. He notes that the next day, he developed chills. He was recently treated with a steroid taper for a suspected rheumatoid flare but notes he continues to have joint pains the same as before the taper and does not feel they improved. Denies rhinorrhea, congestion, nausea, vomiting, abdominal pain, cough, chest pain, diarrhea, constipation, urinary changes, wounds. CXR in the ED unremarkable. Procalcitonin is elevated at 3.4. Given a dose of empiric cefepime in the ED for a fever of 101. Meeting sepsis criteria with tachycardia and tachypnea. Being admitted for further work uip.     Review of Systems   Constitutional:  Positive for chills and fever.   HENT:  Negative for congestion, sneezing and sore throat.    Respiratory:  Negative for cough, shortness of breath and wheezing.    Cardiovascular:  Negative for chest pain.   Gastrointestinal:  Negative for abdominal pain, constipation, diarrhea, nausea and vomiting.   Genitourinary:  Negative for dysuria and frequency.   Musculoskeletal:  Positive for arthralgias, back pain, joint swelling, myalgias and neck pain.   Neurological:  Negative for dizziness, syncope, weakness and numbness.   Psychiatric/Behavioral:  Negative for confusion. The patient is not nervous/anxious.        Historical Information   Past Medical History:   Diagnosis Date    Arthritis     Pancreatitis     RA (rheumatoid arthritis) (HCC)     Rheumatoid arthritis      Past Surgical History:   Procedure Laterality Date    CHOLECYSTECTOMY      FINGER SURGERY Right     fourth digit     Social History     Tobacco Use    Smoking status: Never    Smokeless tobacco: Never   Vaping Use    Vaping status: Never Used   Substance and Sexual Activity "    Alcohol use: Not Currently     Alcohol/week: 2.0 standard drinks of alcohol     Types: 2 Cans of beer per week    Drug use: Never    Sexual activity: Yes     Partners: Female     Birth control/protection: None     E-Cigarette/Vaping    E-Cigarette Use Never User      E-Cigarette/Vaping Substances     Family History   Problem Relation Age of Onset    Cancer Mother     Cancer Father      Social History:  Marital Status: Single   Occupation: noncontributory  Patient Pre-hospital Living Situation: Home  Patient Pre-hospital Level of Mobility: walks  Patient Pre-hospital Diet Restrictions: none    Meds/Allergies   I have reviewed home medications using recent Epic encounter.  Prior to Admission medications    Medication Sig Start Date End Date Taking? Authorizing Provider   Amylase-Lipase-Protease (PANCRELIPASE 09545 PO) Take by mouth   Yes Historical Provider, MD   Certolizumab Pegol (CIMZIA PREFILLED SC) Inject under the skin   Yes Historical Provider, MD   methotrexate 2.5 MG tablet Take 2.5 mg by mouth every 12 hours for 3 doses only each week 6 tablets once a week   Yes Historical Provider, MD   predniSONE 10 mg tablet 3 tabs once daily x 3 days; 2 tabs once daily x 2 days; 1 tab daily x 2 days  Patient taking differently: Take 2.5 mg by mouth daily 11/6/23  Yes Barbara Kocher, CRNP   amoxicillin-clavulanate (AUGMENTIN) 875-125 mg per tablet     Historical Provider, MD   DULoxetine (CYMBALTA) 60 mg delayed release capsule Take 60 mg by mouth daily  Patient not taking: Reported on 9/18/2024    Historical Provider, MD   gabapentin (Neurontin) 300 mg capsule Take 1 capsule (300 mg total) by mouth 3 (three) times a day For post-herpetic neuralgia: Take 1 tablet on day 1,  Then take 2 tablets on day 2, Then take 3 tablets on day 3 and every day after that as instructed by your doctor. 9/18/24 10/18/24  Loyd Preston,    ketorolac (TORADOL) 10 mg tablet Take 1 tablet (10 mg total) by mouth every 6 (six)  hours as needed for moderate pain  Patient not taking: Reported on 9/18/2024 5/18/23   Jazmyn Andrews PA-C   leflunomide (ARAVA) 10 MG tablet Take 10 mg by mouth 2 (two) times a day  Patient not taking: Reported on 9/18/2024    Historical Provider, MD   leflunomide (ARAVA) 20 MG tablet Take 20 mg by mouth daily  Patient not taking: Reported on 9/18/2024 7/10/23   Historical Provider, MD   methocarbamol (ROBAXIN) 500 mg tablet Take 1 tablet (500 mg total) by mouth 4 (four) times a day  Patient not taking: Reported on 9/18/2024 5/18/23   Jazmyn Andrews PA-C   methylPREDNISolone 4 MG tablet therapy pack Use as directed on package 9/18/24   oLyd Preston,    pantoprazole (PROTONIX) 40 mg tablet Take 40 mg by mouth daily  Patient not taking: Reported on 9/18/2024    Historical Provider, MD   predniSONE 5 mg tablet One half tablet daily. 11/6/23   Barbara Kocher, CRNP   Zenpep [id number] units CPEP  7/19/23   Historical Provider, MD     No Known Allergies    Objective :  Temp:  [98.4 °F (36.9 °C)-101.7 °F (38.7 °C)] 98.4 °F (36.9 °C)  HR:  [] 99  BP: (109-140)/(57-74) 109/69  Resp:  [20-32] 22  SpO2:  [93 %-96 %] 96 %  O2 Device: Nasal cannula  Nasal Cannula O2 Flow Rate (L/min):  [2 L/min] 2 L/min    Physical Exam  Vitals and nursing note reviewed.   Constitutional:       Appearance: He is ill-appearing.   Cardiovascular:      Rate and Rhythm: Regular rhythm. Tachycardia present.      Pulses: Normal pulses.      Heart sounds: Normal heart sounds. No murmur heard.     No gallop.   Pulmonary:      Effort: Pulmonary effort is normal.      Breath sounds: Normal breath sounds. No wheezing, rhonchi or rales.   Abdominal:      General: Bowel sounds are normal.      Palpations: Abdomen is soft.      Tenderness: There is no abdominal tenderness. There is no guarding or rebound.   Musculoskeletal:      Right lower leg: No edema.      Left lower leg: No edema.      Comments: No significant joint swelling  Unable to  flex shoulder past 90 degrees bilaterally   Neurological:      Mental Status: He is alert and oriented to person, place, and time. Mental status is at baseline.   Psychiatric:         Mood and Affect: Mood normal.         Behavior: Behavior normal.          Lines/Drains:            Lab Results: I have reviewed the following results:  Results from last 7 days   Lab Units 12/25/24  2307   WBC Thousand/uL 10.88*   HEMOGLOBIN g/dL 13.4   HEMATOCRIT % 39.8   PLATELETS Thousands/uL 147*   SEGS PCT % 89*   LYMPHO PCT % 5*   MONO PCT % 5   EOS PCT % 0     Results from last 7 days   Lab Units 12/25/24  2307   SODIUM mmol/L 133*   POTASSIUM mmol/L 3.2*   CHLORIDE mmol/L 105   CO2 mmol/L 20*   BUN mg/dL 17   CREATININE mg/dL 1.07   ANION GAP mmol/L 8   CALCIUM mg/dL 8.2*   ALBUMIN g/dL 3.3*   TOTAL BILIRUBIN mg/dL 2.02*   ALK PHOS U/L 74   ALT U/L 20   AST U/L 16   GLUCOSE RANDOM mg/dL 121             Lab Results   Component Value Date    HGBA1C 5.3 12/12/2022    HGBA1C 5.0 07/10/2019     Results from last 7 days   Lab Units 12/25/24  2307   LACTIC ACID mmol/L 1.2   PROCALCITONIN ng/ml 3.95*       Imaging Results Review: I reviewed radiology reports from this admission including: chest xray.  Other Study Results Review: No additional pertinent studies reviewed.    Administrative Statements   I have spent a total time of 75 minutes in caring for this patient on the day of the visit/encounter including Diagnostic results, Prognosis, Instructions for management, Importance of tx compliance, Impressions, Counseling / Coordination of care, Documenting in the medical record, Reviewing / ordering tests, medicine, procedures  , Obtaining or reviewing history  , and Communicating with other healthcare professionals .    ** Please Note: This note has been constructed using a voice recognition system. **

## 2024-12-26 NOTE — ASSESSMENT & PLAN NOTE
Present on admission with fever, tachycardia, tachypnea  Mild leukocytosis present as well  Procalcitonin elevated at 3.4, continue to trend  At this time, unclear what is the cause of his sepsis as his only symptoms are generalized myalgias worse in his neck and chills  Possible that this fever is related to a rheumatoid flare  CXR unremarkable  Obtained blood cultures x 2  Check a UA  Received cefepime in the ED, continue with empiric ceftriaxone for now  Hold methotrexate for now, resume when able  Consider ID consult if fevers persist

## 2024-12-27 PROBLEM — R59.1 LYMPHADENOPATHY: Status: ACTIVE | Noted: 2024-12-27

## 2024-12-27 PROBLEM — R91.8 MULTIPLE PULMONARY NODULES: Status: ACTIVE | Noted: 2024-12-27

## 2024-12-27 PROBLEM — K86.2 PANCREATIC CYST: Status: ACTIVE | Noted: 2024-12-27

## 2024-12-27 LAB
ALBUMIN SERPL BCG-MCNC: 2.9 G/DL (ref 3.5–5)
ALP SERPL-CCNC: 57 U/L (ref 34–104)
ALT SERPL W P-5'-P-CCNC: 25 U/L (ref 7–52)
ANION GAP SERPL CALCULATED.3IONS-SCNC: 4 MMOL/L (ref 4–13)
AST SERPL W P-5'-P-CCNC: 17 U/L (ref 13–39)
BACTERIA UR CULT: NORMAL
BASOPHILS # BLD AUTO: 0.01 THOUSANDS/ÂΜL (ref 0–0.1)
BASOPHILS NFR BLD AUTO: 0 % (ref 0–1)
BILIRUB SERPL-MCNC: 0.56 MG/DL (ref 0.2–1)
BUN SERPL-MCNC: 21 MG/DL (ref 5–25)
CALCIUM ALBUM COR SERPL-MCNC: 9.6 MG/DL (ref 8.3–10.1)
CALCIUM SERPL-MCNC: 8.7 MG/DL (ref 8.4–10.2)
CHLORIDE SERPL-SCNC: 111 MMOL/L (ref 96–108)
CO2 SERPL-SCNC: 22 MMOL/L (ref 21–32)
CREAT SERPL-MCNC: 0.79 MG/DL (ref 0.6–1.3)
CRP SERPL QL: 149.6 MG/L
D DIMER PPP FEU-MCNC: 0.31 UG/ML FEU
EOSINOPHIL # BLD AUTO: 0 THOUSAND/ÂΜL (ref 0–0.61)
EOSINOPHIL NFR BLD AUTO: 0 % (ref 0–6)
ERYTHROCYTE [DISTWIDTH] IN BLOOD BY AUTOMATED COUNT: 14.2 % (ref 11.6–15.1)
GFR SERPL CREATININE-BSD FRML MDRD: 99 ML/MIN/1.73SQ M
GLUCOSE SERPL-MCNC: 134 MG/DL (ref 65–140)
HAV IGM SER QL: NORMAL
HBV CORE IGM SER QL: NORMAL
HBV SURFACE AG SER QL: NORMAL
HCT VFR BLD AUTO: 35.2 % (ref 36.5–49.3)
HCV AB SER QL: NORMAL
HGB BLD-MCNC: 11.9 G/DL (ref 12–17)
IMM GRANULOCYTES # BLD AUTO: 0.08 THOUSAND/UL (ref 0–0.2)
IMM GRANULOCYTES NFR BLD AUTO: 1 % (ref 0–2)
LYMPHOCYTES # BLD AUTO: 0.6 THOUSANDS/ÂΜL (ref 0.6–4.47)
LYMPHOCYTES NFR BLD AUTO: 8 % (ref 14–44)
MAGNESIUM SERPL-MCNC: 2.6 MG/DL (ref 1.9–2.7)
MCH RBC QN AUTO: 30.2 PG (ref 26.8–34.3)
MCHC RBC AUTO-ENTMCNC: 33.8 G/DL (ref 31.4–37.4)
MCV RBC AUTO: 89 FL (ref 82–98)
MONOCYTES # BLD AUTO: 0.51 THOUSAND/ÂΜL (ref 0.17–1.22)
MONOCYTES NFR BLD AUTO: 7 % (ref 4–12)
NEUTROPHILS # BLD AUTO: 6.61 THOUSANDS/ÂΜL (ref 1.85–7.62)
NEUTS SEG NFR BLD AUTO: 84 % (ref 43–75)
NRBC BLD AUTO-RTO: 0 /100 WBCS
PHOSPHATE SERPL-MCNC: 2.8 MG/DL (ref 2.7–4.5)
PLATELET # BLD AUTO: 96 THOUSANDS/UL (ref 149–390)
PMV BLD AUTO: 10 FL (ref 8.9–12.7)
POTASSIUM SERPL-SCNC: 4 MMOL/L (ref 3.5–5.3)
PROCALCITONIN SERPL-MCNC: 7.74 NG/ML
PROT SERPL-MCNC: 4.8 G/DL (ref 6.4–8.4)
RBC # BLD AUTO: 3.94 MILLION/UL (ref 3.88–5.62)
SODIUM SERPL-SCNC: 137 MMOL/L (ref 135–147)
WBC # BLD AUTO: 7.81 THOUSAND/UL (ref 4.31–10.16)

## 2024-12-27 PROCEDURE — 86738 MYCOPLASMA ANTIBODY: CPT | Performed by: INTERNAL MEDICINE

## 2024-12-27 PROCEDURE — 85379 FIBRIN DEGRADATION QUANT: CPT | Performed by: INTERNAL MEDICINE

## 2024-12-27 PROCEDURE — 86225 DNA ANTIBODY NATIVE: CPT | Performed by: INTERNAL MEDICINE

## 2024-12-27 PROCEDURE — 86038 ANTINUCLEAR ANTIBODIES: CPT | Performed by: INTERNAL MEDICINE

## 2024-12-27 PROCEDURE — 80074 ACUTE HEPATITIS PANEL: CPT | Performed by: INTERNAL MEDICINE

## 2024-12-27 PROCEDURE — 99232 SBSQ HOSP IP/OBS MODERATE 35: CPT | Performed by: INTERNAL MEDICINE

## 2024-12-27 PROCEDURE — 85025 COMPLETE CBC W/AUTO DIFF WBC: CPT | Performed by: INTERNAL MEDICINE

## 2024-12-27 PROCEDURE — 86140 C-REACTIVE PROTEIN: CPT | Performed by: INTERNAL MEDICINE

## 2024-12-27 PROCEDURE — 84100 ASSAY OF PHOSPHORUS: CPT | Performed by: INTERNAL MEDICINE

## 2024-12-27 PROCEDURE — 86301 IMMUNOASSAY TUMOR CA 19-9: CPT | Performed by: INTERNAL MEDICINE

## 2024-12-27 PROCEDURE — 80053 COMPREHEN METABOLIC PANEL: CPT | Performed by: INTERNAL MEDICINE

## 2024-12-27 PROCEDURE — 83735 ASSAY OF MAGNESIUM: CPT | Performed by: INTERNAL MEDICINE

## 2024-12-27 PROCEDURE — 84145 PROCALCITONIN (PCT): CPT | Performed by: INTERNAL MEDICINE

## 2024-12-27 RX ORDER — METHYLPREDNISOLONE SODIUM SUCCINATE 40 MG/ML
40 INJECTION, POWDER, LYOPHILIZED, FOR SOLUTION INTRAMUSCULAR; INTRAVENOUS EVERY 8 HOURS SCHEDULED
Status: DISCONTINUED | OUTPATIENT
Start: 2024-12-27 | End: 2024-12-28 | Stop reason: HOSPADM

## 2024-12-27 RX ADMIN — METHYLPREDNISOLONE SODIUM SUCCINATE 40 MG: 40 INJECTION, POWDER, FOR SOLUTION INTRAMUSCULAR; INTRAVENOUS at 14:53

## 2024-12-27 RX ADMIN — SODIUM CHLORIDE, SODIUM GLUCONATE, SODIUM ACETATE, POTASSIUM CHLORIDE, MAGNESIUM CHLORIDE, SODIUM PHOSPHATE, DIBASIC, AND POTASSIUM PHOSPHATE 75 ML/HR: .53; .5; .37; .037; .03; .012; .00082 INJECTION, SOLUTION INTRAVENOUS at 12:44

## 2024-12-27 RX ADMIN — KETOROLAC TROMETHAMINE 30 MG: 30 INJECTION, SOLUTION INTRAMUSCULAR; INTRAVENOUS at 04:50

## 2024-12-27 RX ADMIN — CEFTRIAXONE 2000 MG: 2 INJECTION, SOLUTION INTRAVENOUS at 22:48

## 2024-12-27 RX ADMIN — PANCRELIPASE 24000 UNITS: 120000; 24000; 76000 CAPSULE, DELAYED RELEASE PELLETS ORAL at 16:46

## 2024-12-27 RX ADMIN — METHOCARBAMOL TABLETS 500 MG: 500 TABLET, COATED ORAL at 04:50

## 2024-12-27 RX ADMIN — PANCRELIPASE 24000 UNITS: 120000; 24000; 76000 CAPSULE, DELAYED RELEASE PELLETS ORAL at 12:45

## 2024-12-27 RX ADMIN — METHOCARBAMOL TABLETS 500 MG: 500 TABLET, COATED ORAL at 14:53

## 2024-12-27 RX ADMIN — KETOROLAC TROMETHAMINE 30 MG: 30 INJECTION, SOLUTION INTRAMUSCULAR; INTRAVENOUS at 17:41

## 2024-12-27 RX ADMIN — KETOROLAC TROMETHAMINE 30 MG: 30 INJECTION, SOLUTION INTRAMUSCULAR; INTRAVENOUS at 12:44

## 2024-12-27 RX ADMIN — PREDNISONE 2.5 MG: 1 TABLET ORAL at 09:31

## 2024-12-27 RX ADMIN — METHYLPREDNISOLONE SODIUM SUCCINATE 40 MG: 40 INJECTION, POWDER, FOR SOLUTION INTRAMUSCULAR; INTRAVENOUS at 10:47

## 2024-12-27 RX ADMIN — METHYLPREDNISOLONE SODIUM SUCCINATE 40 MG: 40 INJECTION, POWDER, FOR SOLUTION INTRAMUSCULAR; INTRAVENOUS at 22:48

## 2024-12-27 RX ADMIN — PANCRELIPASE 24000 UNITS: 120000; 24000; 76000 CAPSULE, DELAYED RELEASE PELLETS ORAL at 07:49

## 2024-12-27 RX ADMIN — METHOCARBAMOL TABLETS 500 MG: 500 TABLET, COATED ORAL at 22:48

## 2024-12-27 NOTE — ASSESSMENT & PLAN NOTE
Likely reactive in nature  Check a LDH level  Needs outpatient surveillance imaging with his PCP to ensure resolution of the lymphadenopathy    CTA of the chest/PE protocol and CT scan of the abdomen and pelvis with IV contrast (12/26/2024):  MEDIASTINUM AND RITU: Lower right paratracheal, left paratracheal and subcarinal lymph nodes do not meet the criteria for pathologic enlargement.  Small left hilar lymph nodes are seen measuring about 6 to 7 mm.

## 2024-12-27 NOTE — ASSESSMENT & PLAN NOTE
On Arava, Cimzia, methotrexate, and PO prednisone chronically per Rheumatology  Now with an acute flare of Rheumatoid arthritis  Treat with methylprednisolone 40 mg IV every 8 hours  Needs close outpatient follow-up with Rheumatology     Latest Reference Range & Units 12/27/24 04:50   C-REACTIVE PROTEIN <3.0 mg/L 149.6 (H)   (H): Data is abnormally high

## 2024-12-27 NOTE — PROGRESS NOTES
Progress Note - Hospitalist   Name: Deric Salamanca 59 y.o. male I MRN: [id number]  Unit/Bed#: 402-01 I Date of Admission: 12/25/2024   Date of Service: 12/27/2024 I Hospital Day: 1    Assessment & Plan  Sepsis (HCC)  Sepsis was present on admission and due to possible pneumonia.  Follow culture results  Continue ceftriaxone 2000 mg IV every 24 hours  Pancreatic abnormality  Continue on pancreatic enzymes  Rheumatoid arthritis involving multiple sites with positive rheumatoid factor (HCC)  On Arava, Cimzia, methotrexate, and PO prednisone chronically per Rheumatology  Now with an acute flare of Rheumatoid arthritis  Treat with methylprednisolone 40 mg IV every 8 hours  Needs close outpatient follow-up with Rheumatology     Latest Reference Range & Units 12/27/24 04:50   C-REACTIVE PROTEIN <3.0 mg/L 149.6 (H)   (H): Data is abnormally high  Cervical radiculopathy  Consult Neurosurgery    MRI of the cervical spine (12/26/2024):  IMPRESSION:     No acute cervical spine abnormality.     Similar multilevel degenerative changes of cervical spine with varying degrees of canal stenosis (mild C5-C6 and C6-C7) and foraminal narrowing (severe left C6-C7), as detailed above.  Thrombocytopenia (HCC)  Likely due to sepsis  Hold SQ Lovenox  Check a heparin-associated platelet antibody  Cervical spinal stenosis  Consult Neurosurgery    MRI of the cervical spine (12/26/2024):  IMPRESSION:     No acute cervical spine abnormality.     Similar multilevel degenerative changes of cervical spine with varying degrees of canal stenosis (mild C5-C6 and C6-C7) and foraminal narrowing (severe left C6-C7), as detailed above.  Lymphadenopathy  Likely reactive in nature  Check a LDH level  Needs outpatient surveillance imaging with his PCP to ensure resolution of the lymphadenopathy    CTA of the chest/PE protocol and CT scan of the abdomen and pelvis with IV contrast (12/26/2024):  MEDIASTINUM AND RITU: Lower right paratracheal, left paratracheal  and subcarinal lymph nodes do not meet the criteria for pathologic enlargement.  Small left hilar lymph nodes are seen measuring about 6 to 7 mm.  Multiple pulmonary nodules  Outpatient Pulmonology evaluation  Outpatient surveillance imaging with PCP    CTA of the chest/PE protocol and CT scan of the abdomen and pelvis with IV contrast (12/26/2024):  Mild patchy opacity seen right lower lobe may be atelectasis/pneumonitis and follow-up chest CT at 3 months to demonstrate resolution.     Few scattered less than 6 mm nodules left lung. Based on current Fleischner Society 2017 Guidelines on incidental pulmonary nodule,  follow-up CT at 12 months can be considered.  Pancreatic cyst  Check a CA 19-9 level  Outpatient Gastroenterology evaluation    CTA of the chest/PE protocol and CT scan of the abdomen and pelvis with IV contrast (12/26/2024):  PANCREAS: Pancreatic ductal dilatation seen there are multiple cystic lesions seen within the body of the pancreas which likely communicate with the dorsal pancreatic duct. Mild atrophy of the distal body of the pancreas seen the largest cystic   pancreatic lesion measures about 2 cm.    VTE Pharmacologic Prophylaxis:   High Risk (Score >/= 5) - Pharmacological DVT Prophylaxis Contraindicated due to thrombocytopenia. Sequential Compression Devices Ordered.    Mobility:   Basic Mobility Inpatient Raw Score: 20  JH-HLM Goal: 6: Walk 10 steps or more  JH-HLM Achieved: 7: Walk 25 feet or more  JH-HLM Goal achieved. Continue to encourage appropriate mobility.    Patient Centered Rounds: I performed bedside rounds with nursing staff today.     Current Length of Stay: 1 day(s)  Current Patient Status: Inpatient   Certification Statement: The patient will continue to require additional inpatient hospital stay due to the need for IV antibiotic treatment, for IV methylprednisolone treatment, and for close hemodynamic status monitoring.  Discharge Plan: Anticipate discharge tomorrow to  home.    Code Status: Level 1 - Full Code    Subjective   The patient was seen and examined.  The patient is experiencing pain radiating from the posterior neck region down his left upper extremity with associated paresthesias.  He is also experiencing edema and pain involving his left knee.      Objective :  Temp:  [97.4 °F (36.3 °C)-97.5 °F (36.4 °C)] 97.5 °F (36.4 °C)  HR:  [72-80] 74  BP: ()/(62-64) 102/64  Resp:  [18-20] 18  SpO2:  [95 %-97 %] 95 %  O2 Device: None (Room air)    Body mass index is 23.66 kg/m².     Input and Output Summary (last 24 hours):     Intake/Output Summary (Last 24 hours) at 12/27/2024 1008  Last data filed at 12/27/2024 0750  Gross per 24 hour   Intake 480 ml   Output 400 ml   Net 80 ml       Physical Exam  General:  NAD, follows commands  HEENT:  NC/AT, mucous membranes moist  Neck:  Supple, No JVP elevation  CV:  + S1, + S2, RRR  Pulm:  Scattered rhonchi bilaterally  Abd:  Soft, Non-tender, Non-distended  Ext:  No clubbing/cyanosis, Edema of the left knee  Skin:  No rashes  Neuro:  Awake, alert, oriented  Psych:  Normal mood and affect      Lines/Drains:              Lab Results: I have reviewed the following results:   Results from last 7 days   Lab Units 12/27/24  0450   WBC Thousand/uL 7.81   HEMOGLOBIN g/dL 11.9*   HEMATOCRIT % 35.2*   PLATELETS Thousands/uL 96*   SEGS PCT % 84*   LYMPHO PCT % 8*   MONO PCT % 7   EOS PCT % 0     Results from last 7 days   Lab Units 12/27/24  0450   SODIUM mmol/L 137   POTASSIUM mmol/L 4.0   CHLORIDE mmol/L 111*   CO2 mmol/L 22   BUN mg/dL 21   CREATININE mg/dL 0.79   ANION GAP mmol/L 4   CALCIUM mg/dL 8.7   ALBUMIN g/dL 2.9*   TOTAL BILIRUBIN mg/dL 0.56   ALK PHOS U/L 57   ALT U/L 25   AST U/L 17   GLUCOSE RANDOM mg/dL 134                 Results from last 7 days   Lab Units 12/27/24  0450 12/26/24  0514 12/25/24  2307   LACTIC ACID mmol/L  --   --  1.2   PROCALCITONIN ng/ml 7.74* 9.43* 3.95*       Recent Cultures (last 7 days):   Results  from last 7 days   Lab Units 12/26/24  1108 12/25/24  2321 12/25/24  2307   BLOOD CULTURE   --  No Growth at 24 hrs. No Growth at 24 hrs.   LEGIONELLA URINARY ANTIGEN  Negative  --   --        Imaging Results Review: No pertinent imaging studies reviewed.  Other Study Results Review: No additional pertinent studies reviewed.    Last 24 Hours Medication List:     Current Facility-Administered Medications:     acetaminophen (TYLENOL) tablet 650 mg, Q6H PRN    cefTRIAXone (ROCEPHIN) IVPB (premix in dextrose) 2,000 mg 50 mL, Q24H, Last Rate: 2,000 mg (12/26/24 2300)    HYDROmorphone (DILAUDID) injection 0.5 mg, Q4H PRN    HYDROmorphone (DILAUDID) injection 0.5 mg, Q4H PRN    ketorolac (TORADOL) injection 30 mg, Q6H RADHA    methocarbamol (ROBAXIN) tablet 500 mg, Q8H RADHA    methylPREDNISolone sodium succinate (Solu-MEDROL) injection 40 mg, Q8H RADHA    multi-electrolyte (PLASMALYTE-A/ISOLYTE-S PH 7.4) IV solution, Continuous, Last Rate: 75 mL/hr (12/26/24 2147)    ondansetron (ZOFRAN) injection 4 mg, Q6H PRN    pancrelipase (Lip-Prot-Amyl) (CREON) delayed release capsule 24,000 Units, TID With Meals    Administrative Statements   Today, Patient Was Seen By: Law Bliss, DO  I have spent a total time of 35 minutes in caring for this patient on the day of the visit/encounter including Diagnostic results, Prognosis, Risks and benefits of tx options, Instructions for management, and Documenting in the medical record.    **Please Note: This note may have been constructed using a voice recognition system.**

## 2024-12-27 NOTE — ASSESSMENT & PLAN NOTE
Sepsis was present on admission and due to possible pneumonia.  Follow culture results  Continue ceftriaxone 2000 mg IV every 24 hours

## 2024-12-27 NOTE — ASSESSMENT & PLAN NOTE
Consult Neurosurgery    MRI of the cervical spine (12/26/2024):  IMPRESSION:     No acute cervical spine abnormality.     Similar multilevel degenerative changes of cervical spine with varying degrees of canal stenosis (mild C5-C6 and C6-C7) and foraminal narrowing (severe left C6-C7), as detailed above.

## 2024-12-27 NOTE — TELEMEDICINE
"e-Consult (IPC)   Inpatient consult to Neurosurgery  Consult performed by: Emy Jiang PA-C  Consult ordered by: Law Bliss DO         Contacted by Dr. Izaiah DO.    Deric Salamanca 59 y.o. male MRN: [id number]  Unit/Bed#: 402-01 Encounter: [id number]    Reason for Consult  Per provider report, patient is a 58yo M with a PMH significant for RA who presented to the [facility] ER on 12/25 with complaints of acute on chronic neck pain with LUE radiculopathy.  Per report, patient states that the day prior to arrival he moved the wrong way and heard a \"pop\" in his neck pain with acutely worsening neck pain and radicular pain down bilateral shoulders and arms, worse on the left side than the right side.  He also notes that the next day he developed chills.  Patient states he was recently treated for rheumatoid flare with a steroid taper as an outpatient but continues to have the same joint pains that he had before this taper was started.  Upon arrival to the ER, the patient was noted to be tachycardic, tachypneic, and had a fever of 101 on arrival.  He underwent sepsis workup per primary team and was ultimately admitted for pain control and ongoing treatment of infection.  Given his persistent neck pain and left upper extremity radicular pain, an MRI cervical spine was obtained for further evaluation.  Neurosurgery was consulted to review MRI imaging and further recommendations in this regard.  Per discussion with primary team, the patient continues to report some neck pain and left upper extremity pain.  This does seem a bit better after he was started on a short course of steroids for an acute RA flare as well here in the hospital.  He denies any weakness, persistent paresthesias, balance difficulty, frequent falls, urinary retention, etc.  Patient remains admitted for treatment with IV ceftriaxone of a likely pneumonia.    Available past medical history,social history, surgical history, " "medication list, drug allergies and review of systems were reviewed.    /64 (BP Location: Left arm)   Pulse 74   Temp 97.5 °F (36.4 °C) (Oral)   Resp 18   Ht 6' 2\" (1.88 m)   Wt 83.6 kg (184 lb 4.9 oz)   SpO2 95%   BMI 23.66 kg/m²      Clinical exam:   (Per provider report)   GCS 15   Some pain limited weakness in the LUE but able to lift and hold off the bed and still use the arm   Otherwise pain and sensation are equal and intact      Imagin/26 MRI c-spine: No acute cervical spine abnormality. Similar multilevel degenerative changes of cervical spine with varying degrees of canal stenosis (mild C5-C6 and C6-C7) and foraminal narrowing (severe left C6-C7), as detailed above    Assessment and Recommendations   Continue to closely monitor neuro exam   Maintain normotensive BP goals, SBP < 160, MAP > 65   No acute neuro surgical intervention indicated at this time   Case and imaging reviewed this am on rounds   MRI cervical spine reveals mild degenerative changes without any significant central stenosis, cord compression, or cord signal abnormality   There is some TF narrowing on the L at C6-7 that may account for his sx but with just pain and no other red flag s/sx no indication for acute surgery   At this time, recommend conservative management   Continue multimodal pain medication regimen   PT/OT  Recommend outpt referral to  spine and pain management   Pt may follow-up in the  nsgy office on an as needed basis, recommend referral on d/c   Continue treatment for RA exacerbation per primary team   DVT ppx: SCDs, okay for chem dvt ppx from a nsgy standpoint   Medical management per primary team   Social work following for assistance with dispo once medically cleared     Neurosurgery will sign off at this time. Please reach out with any further questions or concerns.       All questions answered. Provider is in agreement with the course of action. 11-20 minutes, >50% of the total time devoted " to medical consultative verbal/EMR discussion between providers. Written report will be generated in the EMR.

## 2024-12-27 NOTE — ASSESSMENT & PLAN NOTE
Check a CA 19-9 level  Outpatient Gastroenterology evaluation    CTA of the chest/PE protocol and CT scan of the abdomen and pelvis with IV contrast (12/26/2024):  PANCREAS: Pancreatic ductal dilatation seen there are multiple cystic lesions seen within the body of the pancreas which likely communicate with the dorsal pancreatic duct. Mild atrophy of the distal body of the pancreas seen the largest cystic   pancreatic lesion measures about 2 cm.

## 2024-12-27 NOTE — ASSESSMENT & PLAN NOTE
Outpatient Pulmonology evaluation  Outpatient surveillance imaging with PCP    CTA of the chest/PE protocol and CT scan of the abdomen and pelvis with IV contrast (12/26/2024):  Mild patchy opacity seen right lower lobe may be atelectasis/pneumonitis and follow-up chest CT at 3 months to demonstrate resolution.     Few scattered less than 6 mm nodules left lung. Based on current Fleischner Society 2017 Guidelines on incidental pulmonary nodule,  follow-up CT at 12 months can be considered.

## 2024-12-27 NOTE — OCCUPATIONAL THERAPY NOTE
Occupational Therapy         Patient Name: Deric Salamanca  Today's Date: 12/27/2024 12/27/24 0756   OT Last Visit   OT Visit Date 12/27/24   Note Type   Note type Screen       Order received and chart review performed; per PT report, pt is performing at Independent level with self-care tasks as well as functional mobility with no device; pt does not require skilled OT needs at this time; will d/c OT orders at this time.      Bubba Luna

## 2024-12-27 NOTE — CASE MANAGEMENT
Case Management Progress Note    Patient name Deric Salamanca  Location /402-01 MRN [id number]  : 1965 Date 2024       LOS (days): 1  Geometric Mean LOS (GMLOS) (days):   Days to GMLOS:        OBJECTIVE:        Current admission status: Inpatient  Preferred Pharmacy:   [facility] [address] [place] [address]  [address] [place] [address]  Mail Order pharmacy  [facility] [address]  Phone: [phone] Fax: [phone]    [facility] - [facility] [address] [facility]  [address] [facility] [address]  Phone: [phone] Fax: [phone]    Primary Care Provider: Caden Salmeron PA-C    Primary Insurance: [facility]  Secondary Insurance:     PROGRESS NOTE:        Anticipated dc 24 hrs, with out patient physical therapy per therapy recommendation on 24.    CM to follow for any discharge needs.

## 2024-12-27 NOTE — UTILIZATION REVIEW
"Initial Clinical Review    Admission: Date/Time/Statement:   Admission Orders (From admission, onward)       Ordered        12/26/24 0010  INPATIENT ADMISSION  Once                          Orders Placed This Encounter   Procedures    INPATIENT ADMISSION     Standing Status:   Standing     Number of Occurrences:   1     Level of Care:   Med Surg [16]     Estimated length of stay:   More than 2 Midnights     Certification:   I certify that inpatient services are medically necessary for this patient for a duration of greater than two midnights. See H&P and MD Progress Notes for additional information about the patient's course of treatment.     ED Arrival Information       Expected   -    Arrival   12/25/2024 21:23    Acuity   Emergent              Means of arrival   Ambulance    Escorted by   [facility]   Hospitalist    Admission type   Emergency              Arrival complaint   Pain             Chief Complaint   Patient presents with    Medical Problem     Patient states he felt a pop on the R side of his neck last night around 9/10pm. Pt c/o pain in b/l wrists, elbows, and neck. +generalized weakness        Initial Presentation: 59 y.o. male who presented by EMS to [facility] ED. Admitted as Inpatient for evaluation and treatment of Sepsis - possible Pneumonia. Acute flare of Rheumatoid Arthritis.     PMHx:  has a past medical history of Arthritis, Pancreatitis, RA (rheumatoid arthritis) ([facility]), and Rheumatoid arthritis.      Presented after hearing a \"pop\" in his neck when he moved the wrong way and began to have severe radicular pain down both shoulders and arms. The next day, he developed chills. Pt recently treated w/ steroid taper for suspected rheumatoid flare but continues to have joint pains. On exam, No significant joint swelling, unable to flex shoulder past 90 degrees bilaterally. In ED, pt febrile w/ temp 101.7. Pt tachycardic and tachypenic. Abnormal Labs Na 133, K 3.2, " Total Protein 5.5, total bili 2.02, Lipas <6, Procal 3.95, WBC 10.88,CRP 4.8. UA: Trace Blood, Occasional mucus threads.CTA chest: MEDIASTINUM AND RITU: Lower right paratracheal, left paratracheal and subcarinal lymph nodes do not meet the criteria for pathologic enlargement.Small left hilar lymph nodes are seen measuring about 6 to 7 mm.    Plan: Continue IV Ceftriaxone, IV Isolyte, Trend Procal, F/U on blood and urine cultures, Hold methotrexate for now, Continue pancreatic enzymes and prednisone, Obtain MRI spine, Multimodal pain control. Check LDH. Outpatient f/u for Lymphadenopathy.     Anticipated Length of Stay/Certification Statement: Patient will be admitted on an inpatient basis with an anticipated length of stay of greater than 2 midnights secondary to fever.     Date: 12/27/24   Day 2:   Pt is c/o pain radiating from the posterior neck region down his LUE w/ associated paresthesias. Pt also c/o edema and pain involving his L knee. On exam, Edema of the left knee, scattered rhonchi bilaterally. Abnormal labs: Total protein 4.8, Albumin 2.9, Procal 7.74, H/H 11.9/35.2, .6. MRI spine: Similar multilevel degenerative changes of cervical spine with varying degrees of canal stenosis (mild C5-C6 and C6-C7) and foraminal narrowing (severe left C6-C7). Plan: Continue Ceftriaxone, F/U cultures, Treat with methylprednisolone 40 mg IV every 8 hours, Consult Neurosurgery. F/U labs in am.     Neurosurgery: Persistent neck pain and left upper extremity radicular pain. Pt continues to report some neck pain and left upper extremity pain, however a bit better after being started on steroids. Plan: No acute neuro surgical intervention indicated at this time. Continue closely monitor neuro exam. PT/OT, continue multimodal pain control. Outpt referral to [facility] spine and pain management.     Certification Statement: The patient will continue to require additional inpatient hospital stay due to the need for IV  antibiotic treatment, for IV methylprednisolone treatment, and for close hemodynamic status monitoring.   Discharge Plan: Anticipate discharge tomorrow to home.     ED Treatment-Medication Administration from 12/25/2024 2123 to 12/26/2024 0025         Date/Time Order Dose Route Action     12/25/2024 2144 ibuprofen (MOTRIN) tablet 800 mg 800 mg Oral Given     12/25/2024 2140 diazepam (VALIUM) injection 5 mg 5 mg Intravenous Given     12/25/2024 2144 dexamethasone (DECADRON) tablet 10 mg 10 mg Oral Given     12/25/2024 2140 HYDROmorphone (DILAUDID) injection 1 mg 1 mg Intravenous Given     12/25/2024 2221 acetaminophen (TYLENOL) tablet 975 mg 975 mg Oral Given     12/25/2024 2307 sodium chloride 0.9 % bolus 1,000 mL 1,000 mL Intravenous New Bag     12/25/2024 2312 ondansetron (ZOFRAN) injection 4 mg 4 mg Intravenous Given     12/25/2024 2315 fentaNYL injection 50 mcg 50 mcg Intravenous Given     12/25/2024 2322 cefepime (MAXIPIME) IVPB (premix in dextrose) 2,000 mg 50 mL 2,000 mg Intravenous New Bag     12/26/2024 0008 sodium chloride 0.9 % bolus 1,000 mL 1,000 mL Intravenous New Bag            Scheduled Medications:  cefTRIAXone, 2,000 mg, Intravenous, Q24H  ketorolac, 30 mg, Intravenous, Q6H RAHDA  methocarbamol, 500 mg, Oral, Q8H RADHA  methylPREDNISolone sodium succinate, 40 mg, Intravenous, Q8H RADHA  pancrelipase (Lip-Prot-Amyl), 24,000 Units, Oral, TID With Meals  enoxaparin (LOVENOX) subcutaneous injection 40 mg  Dose: 40 mg  Freq: Daily Route: SC  Start: 12/26/24 0900 End: 12/27/24 0931  ketorolac (TORADOL) injection 30 mg  Dose: 30 mg  Freq: Every 6 hours scheduled Route: IV  Start: 12/26/24 0115 End: 12/27/24 2359  predniSONE tablet 2.5 mg  Dose: 2.5 mg  Freq: Daily Route: PO  Start: 12/26/24 0900 End: 12/27/24 0938    Continuous IV Infusions:  multi-electrolyte, 75 mL/hr, Intravenous, Continuous      PRN Meds:  acetaminophen, 650 mg, Oral, Q6H PRN  HYDROmorphone, 0.5 mg, Intravenous, Q4H PRN - given x1  12/26  ondansetron, 4 mg, Intravenous, Q6H PRN      ED Triage Vitals   Temperature Pulse Respirations Blood Pressure SpO2 Pain Score   12/25/24 2130 12/25/24 2127 12/25/24 2127 12/25/24 2127 12/25/24 2127 12/25/24 2127   100.4 °F (38 °C) (!) 116 20 140/64 96 % 10 - Worst Possible Pain     Weight (last 2 days)       Date/Time Weight    12/26/24 00:38:52 83.6 (184.3)    12/25/24 2127 83 (183)            Vital Signs (last 3 days)       Date/Time Temp Pulse Resp BP MAP (mmHg) SpO2 Calculated FIO2 (%) - Nasal Cannula Nasal Cannula O2 Flow Rate (L/min) O2 Device Patient Position - Orthostatic VS [place] Coma Scale Score Pain    12/27/24 07:37:37 97.5 °F (36.4 °C) 74 18 102/64 77 95 % -- -- None (Room air) Lying -- --    12/27/24 0450 -- -- -- -- -- -- -- -- -- -- -- 3    12/26/24 21:43:18 97.4 °F (36.3 °C) 80 -- 99/62 74 96 % -- -- -- -- -- --    12/26/24 2100 -- -- -- -- -- -- -- -- None (Room air) -- 15 --    12/26/24 1731 -- -- -- -- -- -- -- -- -- -- -- 5    12/26/24 14:38:14 97.4 °F (36.3 °C) 72 20 99/63 75 97 % -- -- -- -- -- --    12/26/24 1122 -- -- -- -- -- -- -- -- -- -- -- 8 12/26/24 0820 -- -- -- -- -- 96 % -- -- None (Room air) -- -- 3    12/26/24 0501 -- -- -- -- -- -- -- -- -- -- -- 8 12/26/24 0111 -- -- -- -- -- -- -- -- -- -- -- 7    12/26/24 0100 -- -- -- -- -- -- -- -- None (Room air) -- 15 --    12/26/24 00:38:52 98.4 °F (36.9 °C) 99 22 109/69 82 96 % 28 2 L/min Nasal cannula Sitting -- --    12/26/24 0030 -- -- -- -- -- -- -- -- -- -- -- 5    12/26/24 0000 -- 105 20 112/61 80 95 % 28 2 L/min Nasal cannula Lying -- --    12/25/24 2358 99.4 °F (37.4 °C) 107 21 -- -- 96 % 28 2 L/min Nasal cannula -- -- --    12/25/24 2315 -- -- -- -- -- -- -- -- -- -- -- 9    12/25/24 2300 -- 113 27 112/57 72 96 % 28 2 L/min Nasal cannula Lying -- --    12/25/24 2239 -- 118 24 -- -- 94 % 28 2 L/min Nasal cannula -- -- --    12/25/24 2230 -- 120 27 130/59 80 -- -- -- -- Lying -- --    12/25/24 2221 -- -- -- -- -- --  -- -- -- -- -- Med Not Given for Pain - for MAR use only    12/25/24 2200 -- 119 22 132/74 96 93 % -- -- None (Room air) Lying -- --    12/25/24 2149 101.7 °F (38.7 °C) 119 24 -- -- 93 % -- -- None (Room air) -- -- --    12/25/24 2144 -- -- -- -- -- -- -- -- -- -- -- 10 - Worst Possible Pain    12/25/24 2140 -- -- -- -- -- -- -- -- -- -- -- 10 - Worst Possible Pain    12/25/24 2132 -- -- -- -- -- -- -- -- -- -- 15 --    12/25/24 2130 100.4 °F (38 °C) 123 32 137/65 94 94 % -- -- None (Room air) -- -- --    12/25/24 2127 -- 116 20 140/64 -- 96 % -- -- None (Room air) Lying -- 10 - Worst Possible Pain              Pertinent Labs/Diagnostic Test Results:   Radiology:  MRI cervical spine wo contrast   Final Interpretation by Chase Allen MD (12/26 0915)      No acute cervical spine abnormality.      Similar multilevel degenerative changes of cervical spine with varying degrees of canal stenosis (mild C5-C6 and C6-C7) and foraminal narrowing (severe left C6-C7), as detailed above.               Workstation performed: [id number]         CT pe study w abdomen pelvis w contrast   Final Interpretation by Soledad Wells MD (12/26 0905)      This is a limited study due to suboptimal enhancement of the pulmonary arteries. There is no large occlusive pulmonary embolism in the main pulmonary artery and its central branches if concern for pulmonary embolism persists correlation with the leg    ultrasound, D-dimer values.   Study may be repeated as clinically warranted         Mild patchy opacity seen right lower lobe may be atelectasis/pneumonitis and follow-up chest CT at 3 months to demonstrate resolution      Few scattered less than 6 mm nodules left lung. Based on current Fleischner Society 2017 Guidelines on incidental pulmonary nodule,  follow-up CT at 12 months can be considered.      No intra-abdominal fluid collection seen.      Incidentally detected multiple pancreatic cysts with largest cystic pancreatic  lesion measuring about 2 cm, can be assessed with nonemergent MRI along with  MRCP when the acute illness subsides      The study was marked in EPIC for immediate notification.         Workstation performed: [id number]         XR chest 1 view portable   ED Interpretation by Beau Garcia DO (12/25 2328)   1 view x-ray of the chest interpreted by myself pending official radiology report.  When compared to prior chest x-ray on file no acute cardiopulmonary process seen.      Final Interpretation by Shane Cortez MD (12/26 0744)      No acute cardiopulmonary disease.            Workstation performed: [id number]               Results from last 7 days   Lab Units 12/26/24  1124   SARS-COV-2  Not Detected     Results from last 7 days   Lab Units 12/27/24 0450 12/26/24 0514 12/25/24 2307   WBC Thousand/uL 7.81 11.02* 10.88*   HEMOGLOBIN g/dL 11.9* 13.1 13.4   HEMATOCRIT % 35.2* 39.6 39.8   PLATELETS Thousands/uL 96* 139* 147*   TOTAL NEUT ABS Thousands/µL 6.61 10.03* 9.66*         Results from last 7 days   Lab Units 12/27/24 0450 12/26/24  0514 12/25/24 2307 12/23/24  0721   SODIUM mmol/L 137 134* 133* 141   POTASSIUM mmol/L 4.0 3.6 3.2* 4.0   CHLORIDE mmol/L 111* 108 105 107   CO2 mmol/L 22 18* 20* 26   ANION GAP mmol/L 4 8 8 8   BUN mg/dL 21 13 17 15   CREATININE mg/dL 0.79 0.91 1.07 0.92   EGFR ml/min/1.73sq m 99 93 76 97   CALCIUM mg/dL 8.7 8.2* 8.2* 8.8   MAGNESIUM mg/dL 2.6 1.9  --   --    PHOSPHORUS mg/dL 2.8  --   --   --      Results from last 7 days   Lab Units 12/27/24 0450 12/26/24  0514 12/25/24 2307 12/23/24  0721   AST U/L 17 35 16 20   ALT U/L 25 35 20 36   ALK PHOS U/L 57 79 74 89   TOTAL PROTEIN g/dL 4.8* 5.2* 5.5* 6.5   ALBUMIN g/dL 2.9* 3.4* 3.3* 3.8   TOTAL BILIRUBIN mg/dL 0.56 2.29* 2.02* 1.6*         Results from last 7 days   Lab Units 12/27/24  0450 12/26/24  0514 12/25/24  2307 12/23/24  0721   GLUCOSE RANDOM mg/dL 134 145* 121 93     Results from last 7 days   Lab Units  12/25/24  2307   CK TOTAL U/L 61         Results from last 7 days   Lab Units 12/27/24  0450   D-DIMER QUANTITATIVE ug/ml FEU 0.31         Results from last 7 days   Lab Units 12/25/24  2307   TSH 3RD GENERATON uIU/mL 1.936     Results from last 7 days   Lab Units 12/27/24  0450 12/26/24  0514 12/25/24  2307   PROCALCITONIN ng/ml 7.74* 9.43* 3.95*     Results from last 7 days   Lab Units 12/25/24  2307   LACTIC ACID mmol/L 1.2         Results from last 7 days   Lab Units 12/25/24  2307   LIPASE u/L <6*     Results from last 7 days   Lab Units 12/27/24  0450 12/25/24  2307 12/23/24  0721   C-REACTIVE PROTEIN mg/L  --   --  1.4   CRP mg/L 149.6* 4.8*  --    SED RATE mm/hour  --  12  --      Results from last 7 days   Lab Units 12/26/24  0139   CLARITY UA  Clear   COLOR UA  Yellow   SPEC GRAV UA  1.015   PH UA  5.0   GLUCOSE UA mg/dl Negative   KETONES UA mg/dl Negative   BLOOD UA  Trace*   PROTEIN UA mg/dl Negative   NITRITE UA  Negative   BILIRUBIN UA  Negative   UROBILINOGEN UA (BE) mg/dl <2.0   LEUKOCYTES UA  Negative   WBC UA /hpf 0-1   RBC UA /hpf 0-1   BACTERIA UA /hpf None Seen   EPITHELIAL CELLS WET PREP /hpf None Seen   MUCUS THREADS  Occasional*     Results from last 7 days   Lab Units 12/26/24  1124 12/26/24  1108   STREP PNEUMONIAE ANTIGEN, URINE   --  Negative   LEGIONELLA URINARY ANTIGEN   --  Negative   INFLUENZA B  Not Detected  --    RESPIRATORY SYNCYTIAL VIRUS  Not Detected  --      Results from last 7 days   Lab Units 12/26/24  1124   ADENOVIRUS  Not Detected   BORDETELLA PARAPERTUSSIS  Not Detected   BORDETELLA PERTUSSIS  Not Detected   CHLAMYDIA PNEUMONIAE  Not Detected   CORONAVIRUS 229E  Not Detected   CORONAVIRUS HKU1  Not Detected   CORONAVIRUS NL63  Not Detected   CORONAVIRUS OC43  Not Detected   METAPNEUMOVIRUS  Not Detected   RHINOVIRUS  Not Detected   MYCOPLASMA PNEUMONIAE  Not Detected   PARAINFLUENZA 1  Not Detected   PARAINFLUENZA 2  Not Detected   PARAINFLUENZA 3  Not Detected    PARAINFLUENZA 4  Not Detected     Results from last 7 days   Lab Units 12/25/24  2321 12/25/24  2307   BLOOD CULTURE  No Growth at 24 hrs. No Growth at 24 hrs.       Past Medical History:   Diagnosis Date    Arthritis     Pancreatitis     RA (rheumatoid arthritis) ([facility])     Rheumatoid arthritis      Present on Admission:   Rheumatoid arthritis involving multiple sites with positive rheumatoid factor ([facility])   Cervical radiculopathy      Admitting Diagnosis: Myalgia [M79.10]  Fever and chills [R50.9]  SIRS (systemic inflammatory response syndrome) ([facility]) [R65.10]  Generalized weakness [R53.1]  Acute on chronic back pain [M54.9, G89.29]  Age/Sex: 59 y.o. male    Network Utilization Review Department  ATTENTION: Please call with any questions or concerns to [phone] and carefully listen to the prompts so that you are directed to the right person. All voicemails are confidential.   For Discharge needs, contact Care Management DC Support Team at [phone] opt. [phone]  Send all requests for admission clinical reviews, approved or denied determinations and any other requests to dedicated fax number below belonging to the campus where the patient is receiving treatment. List of dedicated fax numbers for the Facilities:  FACILITY NAME UR FAX NUMBER   ADMISSION DENIALS (Administrative/Medical Necessity) [phone]   DISCHARGE SUPPORT TEAM (NETWORK) [phone]   PARENT CHILD HEALTH (Maternity/NICU/Pediatrics) [phone]   [facility] [phone]   [facility] [phone]   [facility] [phone]   [facility] [phone]   [facility] [phone]   [facility] [phone]   [facility] [phone]   [facility] [phone]   [facility] [phone]   [facility] [phone]   [facility] [phone]   [facility] [phone]

## 2024-12-28 VITALS
OXYGEN SATURATION: 97 % | SYSTOLIC BLOOD PRESSURE: 121 MMHG | RESPIRATION RATE: 20 BRPM | TEMPERATURE: 98.3 F | WEIGHT: 184.3 LBS | HEART RATE: 56 BPM | HEIGHT: 74 IN | DIASTOLIC BLOOD PRESSURE: 73 MMHG | BODY MASS INDEX: 23.65 KG/M2

## 2024-12-28 PROBLEM — I49.8 SINUS ARRHYTHMIA: Status: ACTIVE | Noted: 2024-12-28

## 2024-12-28 LAB
ALBUMIN SERPL BCG-MCNC: 3.1 G/DL (ref 3.5–5)
ALP SERPL-CCNC: 60 U/L (ref 34–104)
ALT SERPL W P-5'-P-CCNC: 29 U/L (ref 7–52)
ANION GAP SERPL CALCULATED.3IONS-SCNC: 7 MMOL/L (ref 4–13)
AST SERPL W P-5'-P-CCNC: 18 U/L (ref 13–39)
BASOPHILS # BLD AUTO: 0 THOUSANDS/ÂΜL (ref 0–0.1)
BASOPHILS NFR BLD AUTO: 0 % (ref 0–1)
BILIRUB SERPL-MCNC: 0.36 MG/DL (ref 0.2–1)
BUN SERPL-MCNC: 24 MG/DL (ref 5–25)
CALCIUM ALBUM COR SERPL-MCNC: 9.4 MG/DL (ref 8.3–10.1)
CALCIUM SERPL-MCNC: 8.7 MG/DL (ref 8.4–10.2)
CANCER AG19-9 SERPL-ACNC: <2 U/ML (ref 0–35)
CHLORIDE SERPL-SCNC: 112 MMOL/L (ref 96–108)
CO2 SERPL-SCNC: 21 MMOL/L (ref 21–32)
CREAT SERPL-MCNC: 0.76 MG/DL (ref 0.6–1.3)
CRP SERPL QL: 69.8 MG/L
EOSINOPHIL # BLD AUTO: 0 THOUSAND/ÂΜL (ref 0–0.61)
EOSINOPHIL NFR BLD AUTO: 0 % (ref 0–6)
ERYTHROCYTE [DISTWIDTH] IN BLOOD BY AUTOMATED COUNT: 14.5 % (ref 11.6–15.1)
GFR SERPL CREATININE-BSD FRML MDRD: 101 ML/MIN/1.73SQ M
GLUCOSE SERPL-MCNC: 138 MG/DL (ref 65–140)
HCT VFR BLD AUTO: 36.7 % (ref 36.5–49.3)
HGB BLD-MCNC: 12.1 G/DL (ref 12–17)
IMM GRANULOCYTES # BLD AUTO: 0.07 THOUSAND/UL (ref 0–0.2)
IMM GRANULOCYTES NFR BLD AUTO: 1 % (ref 0–2)
LDH SERPL-CCNC: 106 U/L (ref 140–271)
LYMPHOCYTES # BLD AUTO: 0.61 THOUSANDS/ÂΜL (ref 0.6–4.47)
LYMPHOCYTES NFR BLD AUTO: 8 % (ref 14–44)
M PNEUMO IGG SER IA-ACNC: <100 U/ML (ref 0–99)
M PNEUMO IGM SER IA-ACNC: <770 U/ML (ref 0–769)
MAGNESIUM SERPL-MCNC: 2.7 MG/DL (ref 1.9–2.7)
MCH RBC QN AUTO: 30.2 PG (ref 26.8–34.3)
MCHC RBC AUTO-ENTMCNC: 33 G/DL (ref 31.4–37.4)
MCV RBC AUTO: 92 FL (ref 82–98)
MONOCYTES # BLD AUTO: 0.2 THOUSAND/ÂΜL (ref 0.17–1.22)
MONOCYTES NFR BLD AUTO: 3 % (ref 4–12)
NEUTROPHILS # BLD AUTO: 6.8 THOUSANDS/ÂΜL (ref 1.85–7.62)
NEUTS SEG NFR BLD AUTO: 88 % (ref 43–75)
NRBC BLD AUTO-RTO: 0 /100 WBCS
PHOSPHATE SERPL-MCNC: 3 MG/DL (ref 2.7–4.5)
PLATELET # BLD AUTO: 132 THOUSANDS/UL (ref 149–390)
PMV BLD AUTO: 10.3 FL (ref 8.9–12.7)
POTASSIUM SERPL-SCNC: 4.1 MMOL/L (ref 3.5–5.3)
PROT SERPL-MCNC: 5.2 G/DL (ref 6.4–8.4)
RBC # BLD AUTO: 4.01 MILLION/UL (ref 3.88–5.62)
SODIUM SERPL-SCNC: 140 MMOL/L (ref 135–147)
WBC # BLD AUTO: 7.68 THOUSAND/UL (ref 4.31–10.16)

## 2024-12-28 PROCEDURE — 99239 HOSP IP/OBS DSCHRG MGMT >30: CPT | Performed by: INTERNAL MEDICINE

## 2024-12-28 PROCEDURE — 86022 PLATELET ANTIBODIES: CPT | Performed by: INTERNAL MEDICINE

## 2024-12-28 PROCEDURE — 83735 ASSAY OF MAGNESIUM: CPT | Performed by: INTERNAL MEDICINE

## 2024-12-28 PROCEDURE — 83615 LACTATE (LD) (LDH) ENZYME: CPT | Performed by: INTERNAL MEDICINE

## 2024-12-28 PROCEDURE — 82542 COL CHROMOTOGRAPHY QUAL/QUAN: CPT | Performed by: INTERNAL MEDICINE

## 2024-12-28 PROCEDURE — 86618 LYME DISEASE ANTIBODY: CPT | Performed by: INTERNAL MEDICINE

## 2024-12-28 PROCEDURE — 86140 C-REACTIVE PROTEIN: CPT | Performed by: INTERNAL MEDICINE

## 2024-12-28 PROCEDURE — 86753 PROTOZOA ANTIBODY NOS: CPT | Performed by: INTERNAL MEDICINE

## 2024-12-28 PROCEDURE — 84100 ASSAY OF PHOSPHORUS: CPT | Performed by: INTERNAL MEDICINE

## 2024-12-28 PROCEDURE — 86666 EHRLICHIA ANTIBODY: CPT | Performed by: INTERNAL MEDICINE

## 2024-12-28 PROCEDURE — 80053 COMPREHEN METABOLIC PANEL: CPT | Performed by: INTERNAL MEDICINE

## 2024-12-28 PROCEDURE — 85025 COMPLETE CBC W/AUTO DIFF WBC: CPT | Performed by: INTERNAL MEDICINE

## 2024-12-28 RX ORDER — CEFPODOXIME PROXETIL 200 MG/1
200 TABLET, FILM COATED ORAL 2 TIMES DAILY
Qty: 8 TABLET | Refills: 0 | Status: SHIPPED | OUTPATIENT
Start: 2024-12-28 | End: 2024-12-28

## 2024-12-28 RX ORDER — CEFPODOXIME PROXETIL 200 MG/1
200 TABLET, FILM COATED ORAL 2 TIMES DAILY
Qty: 8 TABLET | Refills: 0 | Status: SHIPPED | OUTPATIENT
Start: 2024-12-28 | End: 2025-01-01

## 2024-12-28 RX ORDER — PREDNISONE 10 MG/1
TABLET ORAL
Qty: 21 TABLET | Refills: 0 | Status: SHIPPED | OUTPATIENT
Start: 2024-12-28

## 2024-12-28 RX ORDER — PREDNISONE 10 MG/1
TABLET ORAL
Qty: 21 TABLET | Refills: 0 | Status: SHIPPED | OUTPATIENT
Start: 2024-12-28 | End: 2024-12-28

## 2024-12-28 RX ADMIN — PANCRELIPASE 24000 UNITS: 120000; 24000; 76000 CAPSULE, DELAYED RELEASE PELLETS ORAL at 11:35

## 2024-12-28 RX ADMIN — METHYLPREDNISOLONE SODIUM SUCCINATE 40 MG: 40 INJECTION, POWDER, FOR SOLUTION INTRAMUSCULAR; INTRAVENOUS at 05:31

## 2024-12-28 RX ADMIN — METHOCARBAMOL TABLETS 500 MG: 500 TABLET, COATED ORAL at 05:31

## 2024-12-28 RX ADMIN — PANCRELIPASE 24000 UNITS: 120000; 24000; 76000 CAPSULE, DELAYED RELEASE PELLETS ORAL at 09:04

## 2024-12-28 NOTE — PLAN OF CARE
Problem: PAIN - ADULT  Goal: Verbalizes/displays adequate comfort level or baseline comfort level  Description: Interventions:  - Encourage patient to monitor pain and request assistance  - Assess pain using appropriate pain scale  - Administer analgesics based on type and severity of pain and evaluate response  - Implement non-pharmacological measures as appropriate and evaluate response  - Consider cultural and social influences on pain and pain management  - Notify physician/advanced practitioner if interventions unsuccessful or patient reports new pain  Outcome: Progressing     Problem: INFECTION - ADULT  Goal: Absence or prevention of progression during hospitalization  Description: INTERVENTIONS:  - Assess and monitor for signs and symptoms of infection  - Monitor lab/diagnostic results  - Monitor all insertion sites, i.e. indwelling lines, tubes, and drains  - Monitor endotracheal if appropriate and nasal secretions for changes in amount and color  - [place] appropriate cooling/warming therapies per order  - Administer medications as ordered  - Instruct and encourage patient and family to use good hand hygiene technique  - Identify and instruct in appropriate isolation precautions for identified infection/condition  Outcome: Progressing     Problem: SAFETY ADULT  Goal: Patient will remain free of falls  Description: INTERVENTIONS:  - Educate patient/family on patient safety including physical limitations  - Instruct patient to call for assistance with activity   - Consult OT/PT to assist with strengthening/mobility   - Keep Call bell within reach  - Keep bed low and locked with side rails adjusted as appropriate  - Keep care items and personal belongings within reach  - Initiate and maintain comfort rounds  - Make Fall Risk Sign visible to staff  - Offer Toileting every 2 Hours, in advance of need  - Initiate/Maintain bed/chair alarm  - Obtain necessary fall risk management equipment: nonskid footwear  -  Apply yellow socks and bracelet for high fall risk patients  - Consider moving patient to room near nurses station  Outcome: Progressing  Goal: Maintain or return to baseline ADL function  Description: INTERVENTIONS:  -  Assess patient's ability to carry out ADLs; assess patient's baseline for ADL function and identify physical deficits which impact ability to perform ADLs (bathing, care of mouth/teeth, toileting, grooming, dressing, etc.)  - Assess/evaluate cause of self-care deficits   - Assess range of motion  - Assess patient's mobility; develop plan if impaired  - Assess patient's need for assistive devices and provide as appropriate  - Encourage maximum independence but intervene and supervise when necessary  - Involve family in performance of ADLs  - Assess for home care needs following discharge   - Consider OT consult to assist with ADL evaluation and planning for discharge  - Provide patient education as appropriate  Outcome: Progressing

## 2024-12-28 NOTE — CASE MANAGEMENT
Case Management Discharge Planning Note    Patient name Deric Salamanca  Location /402-01 MRN [id number]  : 1965 Date 2024       Current Admission Date: 2024  Current Admission Diagnosis:Sepsis (HCC)   Patient Active Problem List    Diagnosis Date Noted Date Diagnosed    Lymphadenopathy 2024     Multiple pulmonary nodules 2024     Pancreatic cyst 2024     Sepsis (HCC) 2024     Thrombocytopenia (HCC) 2024     Cervical spinal stenosis 2024     Chronic pain syndrome 2023     Cervical spondylosis 2023     Cervical radiculopathy 2023     Neck pain 2023     Lower back pain 2023     Transaminitis 2023     Myalgia 2023     Right leg weakness 2023     Effusion of right knee 2023     Pancreatic abnormality 2019     Rheumatoid arthritis involving multiple sites with positive rheumatoid factor (HCC) 2016       LOS (days): 2  Geometric Mean LOS (GMLOS) (days):   Days to GMLOS:     OBJECTIVE:  Risk of Unplanned Readmission Score: 6.9         Current admission status: Inpatient   Preferred Pharmacy:   [facility] [address]  [address] [place] [address]  Mail Order pharmacy  [facility] [address]  Phone: [phone] Fax: [phone]    [facility] [address] [facility]  [address] [facility] [address]  Phone: [phone] Fax: [phone]    Primary Care Provider: Caden Salmeron PA-C    Primary Insurance: [facility]  Secondary Insurance:     DISCHARGE DETAILS:    Discharge planning discussed with:: patient        CM contacted family/caregiver?: No- see comments (declined)  Were Treatment Team discharge recommendations reviewed with patient/caregiver?: Yes  Did patient/caregiver verbalize understanding of patient care needs?: Yes  Were patient/caregiver advised of the risks associated with not following Treatment Team  discharge recommendations?: Yes    Contacts  Contact Method: In Person  Reason/Outcome: Discharge Planning    Would you like to participate in our [facility] service program?  : No - Declined    Treatment Team Recommendation: Home  Discharge Destination Plan:: Home     IMM Given (Date):: 12/28/24  IMM Given to:: Patient   Patient discharging home today. No discharge needs noted. Nurse to review AVS, all follow up providers listed. Brother transporting home.

## 2024-12-28 NOTE — ASSESSMENT & PLAN NOTE
Outpatient Gastroenterology evaluation     Latest Reference Range & Units 12/27/24 04:50   CA 19-9 0 - 35 U/mL <2       CTA of the chest/PE protocol and CT scan of the abdomen and pelvis with IV contrast (12/26/2024):  PANCREAS: Pancreatic ductal dilatation seen there are multiple cystic lesions seen within the body of the pancreas which likely communicate with the dorsal pancreatic duct. Mild atrophy of the distal body of the pancreas seen the largest cystic   pancreatic lesion measures about 2 cm.    Incidentally detected multiple pancreatic cysts with largest cystic pancreatic lesion measuring about 2 cm, can be assessed with nonemergent MRI along with MRCP when the acute illness subsides

## 2024-12-28 NOTE — ASSESSMENT & PLAN NOTE
Likely reactive in nature  Needs outpatient surveillance imaging with his PCP to ensure resolution of the lymphadenopathy  Outpatient Hematology/Oncology evaluation     Latest Reference Range & Units 12/28/24 05:57   LD (LDH) 140 - 271 U/L 106 (L)   (L): Data is abnormally low    CTA of the chest/PE protocol and CT scan of the abdomen and pelvis with IV contrast (12/26/2024):  MEDIASTINUM AND RITU: Lower right paratracheal, left paratracheal and subcarinal lymph nodes do not meet the criteria for pathologic enlargement.  Small left hilar lymph nodes are seen measuring about 6 to 7 mm.

## 2024-12-28 NOTE — DISCHARGE SUMMARY
Discharge Summary - Hospitalist   Name: Deric Salamanca 59 y.o. male I MRN: [id number]  Unit/Bed#: 402-01 I Date of Admission: 12/25/2024   Date of Service: 12/28/2024 I Hospital Day: 2     Assessment & Plan  Sepsis (HCC)  Sepsis was present on admission and due to possible pneumonia.  Treated with ceftriaxone 2000 mg IV every 24 hours during the hospitalization  Discharge on cefpodoxime 200 mg PO BID for 4 days to complete a total of a 7-day antibiotic course  Outpatient Pulmonology evaluation  Outpatient repeat lung imaging with PCP to ensure resolution  Pancreatic abnormality  Continue on pancreatic enzymes  Outpatient Gastroenterology evaluation  Rheumatoid arthritis involving multiple sites with positive rheumatoid factor (HCC)  On Arava, Cimzia, methotrexate, and PO prednisone chronically per Rheumatology  Now with an acute flare of Rheumatoid arthritis  Treated with methylprednisolone 40 mg IV every 8 hours during the hospitalization  Discharge on a PO prednisone tapered-course starting at 40 mg PO Qdaily decreasing by 10 mg every 2 days until down to 10 mg then decrease to 5 mg PO Qdaily for 2 days then go back to his previous prednisone dose of 2.5 mg PO Qdaily  Needs close outpatient follow-up with Rheumatology     Latest Reference Range & Units 12/27/24 04:50   C-REACTIVE PROTEIN <3.0 mg/L 149.6 (H)   (H): Data is abnormally high  Cervical radiculopathy  I appreciate Neurosurgery's input.  Needs outpatient follow-up with Neurosurgery and with the [facility]    MRI of the cervical spine (12/26/2024):  IMPRESSION:     No acute cervical spine abnormality.     Similar multilevel degenerative changes of cervical spine with varying degrees of canal stenosis (mild C5-C6 and C6-C7) and foraminal narrowing (severe left C6-C7), as detailed above.  Thrombocytopenia (HCC)  Likely due to sepsis  Held SQ Lovenox, which was being used as DVT prophylaxis  Checked a heparin-associated platelet antibody on  12/28/2024, which is pending at the time of discharge  Follow the CBC after discharge with his PCP  Outpatient Hematology evaluation  Cervical spinal stenosis  I appreciate Neurosurgery's input.  Needs outpatient follow-up with Neurosurgery and with the [facility]    MRI of the cervical spine (12/26/2024):  IMPRESSION:     No acute cervical spine abnormality.     Similar multilevel degenerative changes of cervical spine with varying degrees of canal stenosis (mild C5-C6 and C6-C7) and foraminal narrowing (severe left C6-C7), as detailed above.  Lymphadenopathy  Likely reactive in nature  Needs outpatient surveillance imaging with his PCP to ensure resolution of the lymphadenopathy  Outpatient Hematology/Oncology evaluation     Latest Reference Range & Units 12/28/24 05:57   LD (LDH) 140 - 271 U/L 106 (L)   (L): Data is abnormally low    CTA of the chest/PE protocol and CT scan of the abdomen and pelvis with IV contrast (12/26/2024):  MEDIASTINUM AND RITU: Lower right paratracheal, left paratracheal and subcarinal lymph nodes do not meet the criteria for pathologic enlargement.  Small left hilar lymph nodes are seen measuring about 6 to 7 mm.  Multiple pulmonary nodules  Outpatient Pulmonology evaluation  Outpatient surveillance imaging with PCP    CTA of the chest/PE protocol and CT scan of the abdomen and pelvis with IV contrast (12/26/2024):  Mild patchy opacity seen right lower lobe may be atelectasis/pneumonitis and follow-up chest CT at 3 months to demonstrate resolution.     Few scattered less than 6 mm nodules left lung. Based on current Fleischner Society 2017 Guidelines on incidental pulmonary nodule,  follow-up CT at 12 months can be considered.  Pancreatic cyst  Outpatient Gastroenterology evaluation     Latest Reference Range & Units 12/27/24 04:50   CA 19-9 0 - 35 U/mL <2       CTA of the chest/PE protocol and CT scan of the abdomen and pelvis with IV contrast (12/26/2024):  PANCREAS:  Pancreatic ductal dilatation seen there are multiple cystic lesions seen within the body of the pancreas which likely communicate with the dorsal pancreatic duct. Mild atrophy of the distal body of the pancreas seen the largest cystic   pancreatic lesion measures about 2 cm.    Incidentally detected multiple pancreatic cysts with largest cystic pancreatic lesion measuring about 2 cm, can be assessed with nonemergent MRI along with MRCP when the acute illness subsides   Sinus arrhythmia  Outpatient Cardiology evaluation     Discharging Physician / Practitioner: Law Bliss DO  PCP: Caden Salmeron PA-C  Admission Date:   Admission Orders (From admission, onward)       Ordered        12/26/24 0010  INPATIENT ADMISSION  Once                          Discharge Date: 12/28/24    Consultations During Hospital Stay:  None    Procedures Performed:   None    Significant Findings / Test Results:   MRI cervical spine wo contrast  Result Date: 12/26/2024  Impression: No acute cervical spine abnormality. Similar multilevel degenerative changes of cervical spine with varying degrees of canal stenosis (mild C5-C6 and C6-C7) and foraminal narrowing (severe left C6-C7), as detailed above. Workstation performed: [id number]     CT pe study w abdomen pelvis w contrast  Result Date: 12/26/2024  Impression: This is a limited study due to suboptimal enhancement of the pulmonary arteries. There is no large occlusive pulmonary embolism in the main pulmonary artery and its central branches if concern for pulmonary embolism persists correlation with the leg ultrasound, D-dimer values. Study may be repeated as clinically warranted Mild patchy opacity seen right lower lobe may be atelectasis/pneumonitis and follow-up chest CT at 3 months to demonstrate resolution Few scattered less than 6 mm nodules left lung. Based on current Fleischner Society 2017 Guidelines on incidental pulmonary nodule,  follow-up CT at 12 months can be considered.  No intra-abdominal fluid collection seen. Incidentally detected multiple pancreatic cysts with largest cystic pancreatic lesion measuring about 2 cm, can be assessed with nonemergent MRI along with  MRCP when the acute illness subsides The study was marked in EPIC for immediate notification. Workstation performed: [id number]     XR chest 1 view portable  Result Date: 12/26/2024  Impression: No acute cardiopulmonary disease. Workstation performed: [id number]     Results from last 7 days   Lab Units 12/28/24  0557 12/27/24  0450 12/26/24  0514   WBC Thousand/uL 7.68 7.81 11.02*   HEMOGLOBIN g/dL 12.1 11.9* 13.1   HEMATOCRIT % 36.7 35.2* 39.6   PLATELETS Thousands/uL 132* 96* 139*     Results from last 7 days   Lab Units 12/28/24  0557 12/27/24  0450 12/26/24  0514   SODIUM mmol/L 140 137 134*   POTASSIUM mmol/L 4.1 4.0 3.6   CHLORIDE mmol/L 112* 111* 108   CO2 mmol/L 21 22 18*   BUN mg/dL 24 21 13   CREATININE mg/dL 0.76 0.79 0.91   CALCIUM mg/dL 8.7 8.7 8.2*     Results from last 7 days   Lab Units 12/28/24  0557 12/27/24  0450 12/26/24  0514   MAGNESIUM mg/dL 2.7 2.6 1.9     Results from last 7 days   Lab Units 12/28/24  0557 12/27/24  0450 12/26/24  0514   ALK PHOS U/L 60 57 79   ALT U/L 29 25 35   AST U/L 18 17 35     Microbiology Results (last 21 days)    Collected Updated Procedure Result Status Patient Facility Result Comment    12/26/2024 1124 12/26/2024 1923 Respiratory Panel 2.1(RP2)with COVID19 [[id number]]   Nasopharyngeal Swab    [facility]  Component Value   Adenovirus Not Detected   Bordetella parapertussis Not Detected   Bordetella pertussis Not Detected   Chlamydia pneumoniae Not Detected   SARS-CoV-2 Not Detected   Coronavirus 229E Not Detected   Coronavirus HKU1 Not Detected   Coronavirus NL63 Not Detected   Coronavirus OC43 Not Detected   Human Metapneumovirus Not Detected   Rhino/Enterovirus Not Detected   Influenza A Not Detected   Influenza B Not Detected    Mycoplasma pneumoniae Not Detected   Parainfluenza 1 Not Detected   Parainfluenza 2 Not Detected   Parainfluenza 3 Not Detected   Parainfluenza 4 Not Detected   Respiratory Syncytial Virus Not Detected          12/26/2024 1108 12/27/2024 1340 Urine culture [[id number]]   Urine, Clean Catch    Final result [facility]  Component Value   Urine Culture No Growth <1000 cfu/mL             12/26/2024 1108 12/26/2024 1850 Strep Pneumoniae, Urine [[id number]]   Urine, Clean Catch    Final result [facility]  Component Value   Strep pneumoniae antigen, urine Negative          12/26/2024 1108 12/26/2024 1850 Legionella antigen, urine [[id number]]   Urine, Clean Catch    Final result [facility]  Component Value   Legionella Urinary Antigen Negative          12/25/2024 2321 12/28/2024 0901 Blood culture #2 [[id number]]   Blood from Arm, Left    Preliminary result [facility]  Component Value   Blood Culture No Growth at 48 hrs. P             12/25/2024 2307 12/28/2024 0901 Blood culture #1 [[id number]]   Blood from Line, Venous    Preliminary result [facility]  Component Value   Blood Culture No Growth at 48 hrs. P             12/25/2024 2219 12/25/2024 2244 FLU/COVID Rapid Antigen (30 min. TAT) - Preferred screening test in ED [[id number]]    Nares from Nose    Final result [facility] This test has been performed using the Quidel Radha 2 FLU+SARS Antigen test under the Emergency Use Authorization (EUA). This test has been validated by the  and verified by the performing laboratory. The Radha uses lateral flow immunofluorescent sandwich assay to detect SARS-COV, Influenza A and Influenza B Antigen.       The Quidel Radha 2 SARS Antigen test does not differentiate between SARS-CoV and SARS-CoV-2.       Negative results are presumptive and may be confirmed with a molecular  assay, if necessary, for patient management. Negative results do not rule out SARS-CoV-2 or influenza infection and should not be used as the sole basis for treatment or patient management decisions. A negative test result may occur if the level of antigen in a sample is below the limit of detection of this test.       Positive results are indicative of the presence of viral antigens, but do not rule out bacterial infection or co-infection with other viruses.       All test results should be used as an adjunct to clinical observations and other information available to the provider.   FOR PEDIATRIC PATIENTS - copy/paste COVID Guidelines URL to browser: [email or web address]    Component Value   SARS COV Rapid Antigen Negative   Influenza A Rapid Antigen Negative   Influenza B Rapid Antigen Negative             12/25/2024 2219 12/25/2024 2251 Strep A PCR [[id number]]   Throat    Final result [facility]  Component Value   STREP A PCR Not Detected          Incidental Findings:   As above    Test Results Pending at Discharge (will require follow-up):  Blood cultures x 2 sets from 12/25/2024  Mycoplasma pneumoniae antibody IgM/IgG and SEBASTIAN screen with reflex on 12/27/2024  Heparin-induced platelet antibody and Heparin antibody by serotonin release from 12/28/2024  Anaplasma phagocyophilum antibodies, Babesia microti antibody IgG and IgM, Blood parasites smear and Lyme Total antibody with reflex to IgM/Ig/G form 12/28/2024     Outpatient Tests Requested:  CBC with differential, CMP, Magnesium level, and Procalcitonin level in 5 days with PCP    Complications:  None    Reason for Admission:  Sepsis    Hospital Course:   Deric Salamanca is a 59 y.o. male patient who originally presented to the hospital on 12/25/2024 due to fevers and severe neck pain radiating down the bilateral upper extremities.    Please see above list of diagnoses and related plan  for additional information.     Condition at Discharge: good    Discharge Day Visit / Exam:   General:  NAD, follows commands  HEENT:  NC/AT, mucous membranes moist  Neck:  Supple, No JVP elevation  CV:  + S1, + S2, RRR  Pulm:  Lung fields are CTA bilaterally  Abd:  Soft, Non-tender, Non-distended  Ext:  No clubbing/cyanosis/edema, Improved range of motion of the bilateral upper extremities  Skin:  No rashes  Neuro:  Awake, alert, oriented  Psych:  Normal mood and affect      Discharge instructions/Information to patient and family:  See after visit summary for information provided to patient and family.      Provisions for Follow-Up Care:  See after visit summary for information related to follow-up care and any pertinent home health orders.      Mobility at time of Discharge:   Basic Mobility Inpatient Raw Score: 24  JH-HLM Goal: 8: Walk 250 feet or more  JH-HLM Achieved: 8: Walk 250 feet ot more  HLM Goal achieved. Continue to encourage appropriate mobility.     Disposition:   Home    Discharge Medications:  See after visit summary for reconciled discharge medications provided to patient and/or family.      Administrative Statements   Discharge Statement:  I have spent a total time of 45 minutes in caring for this patient on the day of the visit/encounter. >30 minutes of time was spent on: Diagnostic results, Prognosis, Risks and benefits of tx options, Instructions for management, and Documenting in the medical record.    **Please Note: This note may have been constructed using a voice recognition system**

## 2024-12-28 NOTE — ASSESSMENT & PLAN NOTE
Sepsis was present on admission and due to possible pneumonia.  Treated with ceftriaxone 2000 mg IV every 24 hours during the hospitalization  Discharge on cefpodoxime 200 mg PO BID for 4 days to complete a total of a 7-day antibiotic course  Outpatient Pulmonology evaluation  Outpatient repeat lung imaging with PCP to ensure resolution

## 2024-12-28 NOTE — ASSESSMENT & PLAN NOTE
Likely due to sepsis  Held SQ Lovenox, which was being used as DVT prophylaxis  Checked a heparin-associated platelet antibody on 12/28/2024, which is pending at the time of discharge  Follow the CBC after discharge with his PCP  Outpatient Hematology evaluation

## 2024-12-28 NOTE — ASSESSMENT & PLAN NOTE
I appreciate Neurosurgery's input.  Needs outpatient follow-up with Neurosurgery and with the [facility]    MRI of the cervical spine (12/26/2024):  IMPRESSION:     No acute cervical spine abnormality.     Similar multilevel degenerative changes of cervical spine with varying degrees of canal stenosis (mild C5-C6 and C6-C7) and foraminal narrowing (severe left C6-C7), as detailed above.

## 2024-12-28 NOTE — DISCHARGE INSTRUCTIONS
[facility]  [address] [address]  Dept: [phone]    December 28, 2024     Patient: Deric Salamanca   YOB: 1965   Date of Visit: 12/25/2024       To Whom it May Concern:    Deric Salamanca is under my professional care. He was seen in the hospital from 12/25/2024 to 12/28/24. He may return to work on Tuesday, 12/31/2024, without limitations.    If you have any questions or concerns, please don't hesitate to call.         Sincerely,          Law Bliss, DO

## 2024-12-28 NOTE — PLAN OF CARE
Problem: PAIN - ADULT  Goal: Verbalizes/displays adequate comfort level or baseline comfort level  Description: Interventions:  - Encourage patient to monitor pain and request assistance  - Assess pain using appropriate pain scale  - Administer analgesics based on type and severity of pain and evaluate response  - Implement non-pharmacological measures as appropriate and evaluate response  - Consider cultural and social influences on pain and pain management  - Notify physician/advanced practitioner if interventions unsuccessful or patient reports new pain  Outcome: Progressing     Problem: INFECTION - ADULT  Goal: Absence or prevention of progression during hospitalization  Description: INTERVENTIONS:  - Assess and monitor for signs and symptoms of infection  - Monitor lab/diagnostic results  - Monitor all insertion sites, i.e. indwelling lines, tubes, and drains  - Monitor endotracheal if appropriate and nasal secretions for changes in amount and color  - Mullins appropriate cooling/warming therapies per order  - Administer medications as ordered  - Instruct and encourage patient and family to use good hand hygiene technique  - Identify and instruct in appropriate isolation precautions for identified infection/condition  Outcome: Progressing     Problem: SAFETY ADULT  Goal: Patient will remain free of falls  Description: INTERVENTIONS:  - Educate patient/family on patient safety including physical limitations  - Instruct patient to call for assistance with activity   - Consult OT/PT to assist with strengthening/mobility   - Keep Call bell within reach  - Keep bed low and locked with side rails adjusted as appropriate  - Keep care items and personal belongings within reach  - Initiate and maintain comfort rounds  - Make Fall Risk Sign visible to staff  - Offer Toileting every 2 Hours, in advance of need  - Initiate/Maintain bed/chair alarm  - Obtain necessary fall risk management equipment: nonskid footwear  -  Apply yellow socks and bracelet for high fall risk patients  - Consider moving patient to room near nurses station  Outcome: Progressing  Goal: Maintain or return to baseline ADL function  Description: INTERVENTIONS:  -  Assess patient's ability to carry out ADLs; assess patient's baseline for ADL function and identify physical deficits which impact ability to perform ADLs (bathing, care of mouth/teeth, toileting, grooming, dressing, etc.)  - Assess/evaluate cause of self-care deficits   - Assess range of motion  - Assess patient's mobility; develop plan if impaired  - Assess patient's need for assistive devices and provide as appropriate  - Encourage maximum independence but intervene and supervise when necessary  - Involve family in performance of ADLs  - Assess for home care needs following discharge   - Consider OT consult to assist with ADL evaluation and planning for discharge  - Provide patient education as appropriate  Outcome: Progressing

## 2024-12-28 NOTE — ASSESSMENT & PLAN NOTE
On Arava, Cimzia, methotrexate, and PO prednisone chronically per Rheumatology  Now with an acute flare of Rheumatoid arthritis  Treated with methylprednisolone 40 mg IV every 8 hours during the hospitalization  Discharge on a PO prednisone tapered-course starting at 40 mg PO Qdaily decreasing by 10 mg every 2 days until down to 10 mg then decrease to 5 mg PO Qdaily for 2 days then go back to his previous prednisone dose of 2.5 mg PO Qdaily  Needs close outpatient follow-up with Rheumatology     Latest Reference Range & Units 12/27/24 04:50   C-REACTIVE PROTEIN <3.0 mg/L 149.6 (H)   (H): Data is abnormally high

## 2024-12-29 LAB
B BURGDOR IGG+IGM SER QL IA: NEGATIVE
PF4 HEPARIN CMPLX AB SER-ACNC: 0.06 OD (ref 0–0.4)

## 2024-12-30 ENCOUNTER — TELEPHONE (OUTPATIENT)
Dept: PHYSICAL THERAPY | Facility: OTHER | Age: 59
End: 2024-12-30

## 2024-12-30 LAB
B MICROTI IGG TITR SER: NORMAL {TITER}
B MICROTI IGM TITR SER: NORMAL {TITER}
RESULT/COMMENT: NORMAL

## 2024-12-30 NOTE — TELEPHONE ENCOUNTER
Call placed to the patient per [facility] referral.     V/m left for patient to call back. Phone number and hours of business provided.     This is the 1st attempt to reach the patient. Will defer referral per protocol.     NOTE:    Patient has referral for Neurology . Already scheduled for eval on 02/19/25. (Same dx).    Pt has a referral for Neurosx. No appt scheduled yet. (Same dx).    MRI cervical spine done 12/26.

## 2024-12-31 LAB
BACTERIA BLD CULT: NORMAL
BACTERIA BLD CULT: NORMAL
SRA .2 IU/ML UFH SER-ACNC: <1 % (ref 0–20)
SRA 100IU/ML UFH SER-ACNC: <1 % (ref 0–20)
SRA UFH SER-IMP: NORMAL

## 2024-12-31 NOTE — UTILIZATION REVIEW
NOTIFICATION OF ADMISSION DISCHARGE   This is a Notification of Discharge from [facility]. Please be advised that this patient has been discharge from our facility. Below you will find the admission and discharge date and time including the patient’s disposition.   UTILIZATION REVIEW CONTACT:  Woody Cardona  Utilization   Network Utilization Review Department  Phone: [phone] x carefully listen to the prompts. All voicemails are confidential.  Email: [email or web address]     ADMISSION INFORMATION  PRESENTATION DATE: 12/25/2024  9:23 PM  OBERVATION ADMISSION DATE: N/A  INPATIENT ADMISSION DATE: 12/26/24 12:10 AM   DISCHARGE DATE: 12/28/2024 12:53 PM   DISPOSITION:Home/Self Care    Network Utilization Review Department  ATTENTION: Please call with any questions or concerns to [phone] and carefully listen to the prompts so that you are directed to the right person. All voicemails are confidential.   For Discharge needs, contact Care Management DC Support Team at [phone] opt. 2  Send all requests for admission clinical reviews, approved or denied determinations and any other requests to dedicated fax number below belonging to the campus where the patient is receiving treatment. List of dedicated fax numbers for the Facilities:  FACILITY NAME UR FAX NUMBER   ADMISSION DENIALS (Administrative/Medical Necessity) [phone]   DISCHARGE SUPPORT TEAM ([facility]) [phone]   PARENT CHILD HEALTH (Maternity/NICU/Pediatrics) [phone]   [facility] [phone]   [facility] [phone]   [facility] [phone]   [facility] [phone]   [facility] [phone]   [facility] [phone]   [facility] [phone]   [facility] [phone]    [facility] [phone]   [facility] [phone]   [facility] [phone]   [facility] [phone]

## 2025-01-01 LAB
A PHAGOCYTOPH IGG TITR SER IF: NORMAL {TITER}
A PHAGOCYTOPH IGM TITR SER IF: NORMAL {TITER}
LABORATORY REPORT: NORMAL
MICRODELETION SYND BLD/T FISH: NORMAL

## 2025-01-02 LAB
DSDNA IGG SERPL IA-ACNC: 1 IU/ML (ref ?–15)
NUCLEAR IGG SER IA-RTO: 0.2 RATIO (ref ?–1)

## 2025-01-08 NOTE — TELEPHONE ENCOUNTER
Call placed to the patient per [facility] referral.     V/m left for patient to call back. Phone number and hours of business provided.     This is the 2nd attempt to reach the patient. Will close referral per protocol.     NOTE:  Patient has referral for Neurology . Already scheduled for eval on 02/19/25. (Same dx).     Pt has a referral for Neurosx. No appt scheduled yet. (Same dx).     MRI cervical spine done 12/26.

## 2025-01-25 PROBLEM — A41.9 SEPSIS (HCC): Status: RESOLVED | Noted: 2024-12-26 | Resolved: 2025-01-25

## 2025-01-29 ENCOUNTER — CONSULT (OUTPATIENT)
Dept: PULMONOLOGY | Facility: CLINIC | Age: 60
End: 2025-01-29
Payer: COMMERCIAL

## 2025-01-29 VITALS
SYSTOLIC BLOOD PRESSURE: 134 MMHG | TEMPERATURE: 98.1 F | HEIGHT: 74 IN | OXYGEN SATURATION: 96 % | WEIGHT: 182.4 LBS | BODY MASS INDEX: 23.41 KG/M2 | HEART RATE: 70 BPM | DIASTOLIC BLOOD PRESSURE: 82 MMHG

## 2025-01-29 DIAGNOSIS — R91.8 MULTIPLE PULMONARY NODULES: ICD-10-CM

## 2025-01-29 DIAGNOSIS — M05.79 RHEUMATOID ARTHRITIS INVOLVING MULTIPLE SITES WITH POSITIVE RHEUMATOID FACTOR (HCC): Primary | ICD-10-CM

## 2025-01-29 PROCEDURE — G2211 COMPLEX E/M VISIT ADD ON: HCPCS | Performed by: INTERNAL MEDICINE

## 2025-01-29 PROCEDURE — 99204 OFFICE O/P NEW MOD 45 MIN: CPT | Performed by: INTERNAL MEDICINE

## 2025-01-29 NOTE — ASSESSMENT & PLAN NOTE
Deric and I reviewed his most recent CAT scan which showed several small subcentimeter lung nodules.  He is at low risk for cancer given his non-smoking status will repeat his CAT scan in 12 months and follow-up for total of 2 years.    Orders:    Ambulatory Referral to Pulmonology    CT chest without contrast; Future

## 2025-01-29 NOTE — PROGRESS NOTES
"Name: Deric Salamanca      : 1965      MRN: [id number]  Encounter Provider: Lucita Anthony DO  Encounter Date: 2025   Encounter department: [facility] PULMONARY [facility]  :  Assessment & Plan  Multiple pulmonary nodules  Deric and I reviewed his most recent CAT scan which showed several small subcentimeter lung nodules.  He is at low risk for cancer given his non-smoking status will repeat his CAT scan in 12 months and follow-up for total of 2 years.    Orders:    Ambulatory Referral to Pulmonology    CT chest without contrast; Future    Rheumatoid arthritis involving multiple sites with positive rheumatoid factor (HCC)  Although Deric has an immunocompromising state with his rheumatoid and autoimmune medications there is no evidence of opportunistic infection or pulmonary consequences of rheumatoid arthritis on his scan.             History of Present Illness   HPI  Deric Salamanca is a 59 y.o. male who presents for follow-up of his lung nodules.  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  He does have rheumatoid arthritis for which he takes medication but denies any respiratory symptoms.  History obtained from: patient    Review of Systems   Constitutional: Negative.    HENT: Negative.     Eyes: Negative.    Respiratory:  Negative for shortness of breath.    Cardiovascular: Negative.    Gastrointestinal: Negative.    Endocrine: Negative.    Genitourinary: Negative.    Allergic/Immunologic: Negative.    Neurological: Negative.    Hematological: Negative.    Psychiatric/Behavioral: Negative.       Medical History Reviewed by provider this encounter:     .     Objective   /82 (BP Location: Left arm, Patient Position: Sitting, Cuff Size: Adult)   Pulse 70   Temp 98.1 °F (36.7 °C) (Temporal)   Ht 6' 2\" (1.88 m)   Wt 82.7 kg (182 lb 6.4 oz)   SpO2 96%   BMI 23.42 kg/m²      Physical Exam  Constitutional:       Appearance: He is well-developed. "   HENT:      Head: Normocephalic and atraumatic.   Eyes:      Pupils: Pupils are equal, round, and reactive to light.   Cardiovascular:      Rate and Rhythm: Normal rate and regular rhythm.      Heart sounds: No murmur heard.  Pulmonary:      Effort: Pulmonary effort is normal. No respiratory distress.      Breath sounds: Normal breath sounds. No wheezing or rales.   Abdominal:      Palpations: Abdomen is soft.   Musculoskeletal:      Cervical back: Normal range of motion and neck supple.   Skin:     General: Skin is warm and dry.   Neurological:      Mental Status: He is alert and oriented to person, place, and time.         Administrative Statements   I have spent a total time of 35 minutes in caring for this patient on the day of the visit/encounter including Diagnostic results, Counseling / Coordination of care, Documenting in the medical record, Reviewing / ordering tests, medicine, procedures  , and Obtaining or reviewing history  .

## 2025-01-29 NOTE — ASSESSMENT & PLAN NOTE
Although Deric has an immunocompromising state with his rheumatoid and autoimmune medications there is no evidence of opportunistic infection or pulmonary consequences of rheumatoid arthritis on his scan.

## 2025-02-12 ENCOUNTER — TELEPHONE (OUTPATIENT)
Dept: NEUROLOGY | Facility: CLINIC | Age: 60
End: 2025-02-12

## 2025-02-12 NOTE — TELEPHONE ENCOUNTER
made a call for appt reminder with Dr. Buckner 2/19/25 @9:00am  and also mentioned [place] office address and phone number as well. informed arrive 10 to 15 mins early for the appt.